# Patient Record
Sex: MALE | Race: WHITE | NOT HISPANIC OR LATINO | Employment: FULL TIME | URBAN - METROPOLITAN AREA
[De-identification: names, ages, dates, MRNs, and addresses within clinical notes are randomized per-mention and may not be internally consistent; named-entity substitution may affect disease eponyms.]

---

## 2017-10-10 ENCOUNTER — OFFICE VISIT (OUTPATIENT)
Dept: FAMILY MEDICINE | Facility: HOSPITAL | Age: 42
End: 2017-10-10
Attending: FAMILY MEDICINE

## 2017-10-10 VITALS
WEIGHT: 217.63 LBS | BODY MASS INDEX: 34.98 KG/M2 | DIASTOLIC BLOOD PRESSURE: 113 MMHG | SYSTOLIC BLOOD PRESSURE: 175 MMHG | HEART RATE: 95 BPM | HEIGHT: 66 IN | RESPIRATION RATE: 20 BRPM

## 2017-10-10 DIAGNOSIS — F10.10 ALCOHOL ABUSE: ICD-10-CM

## 2017-10-10 DIAGNOSIS — E78.2 MIXED HYPERLIPIDEMIA: ICD-10-CM

## 2017-10-10 DIAGNOSIS — R73.9 HYPERGLYCEMIA: Primary | ICD-10-CM

## 2017-10-10 DIAGNOSIS — Z71.3 DIETARY COUNSELING: ICD-10-CM

## 2017-10-10 DIAGNOSIS — E11.42 TYPE 2 DIABETES MELLITUS WITH DIABETIC POLYNEUROPATHY, WITHOUT LONG-TERM CURRENT USE OF INSULIN: ICD-10-CM

## 2017-10-10 DIAGNOSIS — Z71.82 EXERCISE COUNSELING: ICD-10-CM

## 2017-10-10 DIAGNOSIS — Z72.0 TOBACCO ABUSE: ICD-10-CM

## 2017-10-10 LAB — GLUCOSE SERPL-MCNC: 215 MG/DL (ref 70–110)

## 2017-10-10 PROCEDURE — 99214 OFFICE O/P EST MOD 30 MIN: CPT | Performed by: FAMILY MEDICINE

## 2017-10-10 PROCEDURE — 82948 REAGENT STRIP/BLOOD GLUCOSE: CPT | Performed by: FAMILY MEDICINE

## 2017-10-10 RX ORDER — ATORVASTATIN CALCIUM 40 MG/1
40 TABLET, FILM COATED ORAL DAILY
Qty: 30 TABLET | Refills: 0 | Status: SHIPPED | OUTPATIENT
Start: 2017-10-10 | End: 2019-07-22

## 2017-10-10 RX ORDER — AMLODIPINE BESYLATE 10 MG/1
10 TABLET ORAL DAILY
Qty: 30 TABLET | Refills: 11 | Status: SHIPPED | OUTPATIENT
Start: 2017-10-10 | End: 2018-10-10

## 2017-10-10 RX ORDER — ASPIRIN 81 MG/1
81 TABLET ORAL DAILY
Status: DISCONTINUED | OUTPATIENT
Start: 2017-10-10 | End: 2019-07-22

## 2017-10-10 RX ORDER — GABAPENTIN 300 MG/1
300 CAPSULE ORAL 3 TIMES DAILY
Qty: 90 CAPSULE | Refills: 11 | Status: SHIPPED | OUTPATIENT
Start: 2017-10-10 | End: 2019-07-22

## 2017-10-10 RX ORDER — GLYBURIDE 1.25 MG/1
2.5 TABLET ORAL
Qty: 60 TABLET | Refills: 11 | Status: SHIPPED | OUTPATIENT
Start: 2017-10-10 | End: 2019-07-22

## 2017-10-10 RX ORDER — LISINOPRIL 10 MG/1
20 TABLET ORAL DAILY
Qty: 60 TABLET | Refills: 0 | Status: SHIPPED | OUTPATIENT
Start: 2017-10-10 | End: 2017-11-09

## 2017-10-10 NOTE — PROGRESS NOTES
Subjective:       Patient ID: Hany Guerin is a 42 y.o. male.    Chief Complaint: Numbness; Hypertension; and Diabetes    Patient 43 yo male that is new to this clinic but has known hx of DM2, HTN, HLD, EtOH abuse, tobacco abuse that presents here to establish care. States that he was on medications including metformin but states that he has not taken any of his medications for over a year. States that it was because he continued to have diarrhea for over 2 months. Since then has not checked BP at home or home glucose. Does complain about BL lower extremity numbness, tingling and stabbing pain.       Review of Systems   Constitutional: Negative for chills, fatigue and fever.   HENT: Negative for congestion and sinus pressure.    Respiratory: Negative for chest tightness and shortness of breath.    Cardiovascular: Negative for chest pain, palpitations and leg swelling.   Gastrointestinal: Negative for abdominal pain, constipation, diarrhea, nausea and vomiting.   Genitourinary: Negative for dysuria.   Musculoskeletal: Positive for myalgias.   Neurological: Positive for numbness. Negative for light-headedness.       Objective:      Vitals:    10/10/17 0931   BP: (!) 175/113   Pulse: 95   Resp: 20     Physical Exam   Constitutional: He is oriented to person, place, and time. He appears well-developed and well-nourished.   HENT:   Head: Normocephalic and atraumatic.   Eyes: Pupils are equal, round, and reactive to light.   Cardiovascular: Normal rate, regular rhythm, normal heart sounds and intact distal pulses.  Exam reveals no gallop and no friction rub.    No murmur heard.  Pulmonary/Chest: Effort normal and breath sounds normal. No respiratory distress. He has no wheezes. He has no rales. He exhibits no tenderness.   Abdominal: Soft. Bowel sounds are normal. There is no tenderness.   Musculoskeletal: Normal range of motion. He exhibits no edema or tenderness.   Neurological: He is alert and oriented to  person, place, and time. He has normal reflexes.   Skin: Skin is warm. No erythema.   Psychiatric: He has a normal mood and affect. His behavior is normal.   Nursing note and vitals reviewed.      Assessment:       1. Hyperglycemia    2. Type 2 diabetes mellitus with diabetic polyneuropathy, without long-term current use of insulin    3. Mixed hyperlipidemia    4. Alcohol abuse    5. Tobacco abuse    6. Dietary counseling    7. Exercise counseling        Plan:       Hyperglycemia  -     POCT Glucose  -     lisinopril 10 MG tablet; Take 2 tablets (20 mg total) by mouth once daily.  Dispense: 60 tablet; Refill: 0  -     glyBURIDE (DIABETA) 1.25 MG Tab; Take 2 tablets (2.5 mg total) by mouth daily with breakfast.  Dispense: 60 tablet; Refill: 11  -     atorvastatin (LIPITOR) 40 MG tablet; Take 1 tablet (40 mg total) by mouth once daily.  Dispense: 30 tablet; Refill: 0  -     amlodipine (NORVASC) 10 MG tablet; Take 1 tablet (10 mg total) by mouth once daily.  Dispense: 30 tablet; Refill: 11  -     gabapentin (NEURONTIN) 300 MG capsule; Take 1 capsule (300 mg total) by mouth 3 (three) times daily.  Dispense: 90 capsule; Refill: 11  -     aspirin EC tablet 81 mg; Take 1 tablet (81 mg total) by mouth once daily.  -     HEMOGLOBIN A1C; Future; Expected date: 10/10/2017  -     CBC auto differential; Future; Expected date: 10/10/2017  -     Comprehensive metabolic panel; Future; Expected date: 10/10/2017  -     Lipid panel; Future; Expected date: 10/10/2017  -     Microalbumin/creatinine urine ratio    Type 2 diabetes mellitus with diabetic polyneuropathy, without long-term current use of insulin  -     lisinopril 10 MG tablet; Take 2 tablets (20 mg total) by mouth once daily.  Dispense: 60 tablet; Refill: 0  -     glyBURIDE (DIABETA) 1.25 MG Tab; Take 2 tablets (2.5 mg total) by mouth daily with breakfast.  Dispense: 60 tablet; Refill: 11  -     atorvastatin (LIPITOR) 40 MG tablet; Take 1 tablet (40 mg total) by mouth once  daily.  Dispense: 30 tablet; Refill: 0  -     amlodipine (NORVASC) 10 MG tablet; Take 1 tablet (10 mg total) by mouth once daily.  Dispense: 30 tablet; Refill: 11  -     gabapentin (NEURONTIN) 300 MG capsule; Take 1 capsule (300 mg total) by mouth 3 (three) times daily.  Dispense: 90 capsule; Refill: 11  -     aspirin EC tablet 81 mg; Take 1 tablet (81 mg total) by mouth once daily.  -     HEMOGLOBIN A1C; Future; Expected date: 10/10/2017  -     CBC auto differential; Future; Expected date: 10/10/2017  -     Comprehensive metabolic panel; Future; Expected date: 10/10/2017  -     Lipid panel; Future; Expected date: 10/10/2017  -     Microalbumin/creatinine urine ratio    Mixed hyperlipidemia  -     aspirin EC tablet 81 mg; Take 1 tablet (81 mg total) by mouth once daily.  -     Comprehensive metabolic panel; Future; Expected date: 10/10/2017  -     Lipid panel; Future; Expected date: 10/10/2017  -     Microalbumin/creatinine urine ratio    Alcohol abuse  -Counseled on quitting, patient states that he does not want behavioral intervention will attempt on his own   Tobacco abuse  -Counseled patient states that he will stopping tobacco abuse, currently smokes 2-3 cigs per day  Dietary counseling  Counseled on low carb and low sodium diet   Exercise counseling      F/U in 1 month   Leonardo Luong MD  10/10/2017  10:36 AM  [employer] [facility] PGY-3

## 2017-11-29 ENCOUNTER — OFFICE VISIT (OUTPATIENT)
Dept: URGENT CARE | Facility: CLINIC | Age: 42
End: 2017-11-29

## 2017-11-29 VITALS
HEIGHT: 66 IN | HEART RATE: 91 BPM | OXYGEN SATURATION: 98 % | BODY MASS INDEX: 35.36 KG/M2 | DIASTOLIC BLOOD PRESSURE: 111 MMHG | TEMPERATURE: 97 F | RESPIRATION RATE: 20 BRPM | SYSTOLIC BLOOD PRESSURE: 177 MMHG | WEIGHT: 220 LBS

## 2017-11-29 DIAGNOSIS — M25.522 ELBOW PAIN, LEFT: Primary | ICD-10-CM

## 2017-11-29 PROCEDURE — 99213 OFFICE O/P EST LOW 20 MIN: CPT | Mod: S$GLB,,, | Performed by: NURSE PRACTITIONER

## 2017-11-29 RX ORDER — NAPROXEN 500 MG/1
500 TABLET ORAL 2 TIMES DAILY WITH MEALS
Qty: 30 TABLET | Refills: 0 | Status: SHIPPED | OUTPATIENT
Start: 2017-11-29 | End: 2018-11-29

## 2017-11-29 NOTE — PATIENT INSTRUCTIONS
Elbow Sprain    A sprain is a tearing of the ligaments that hold a joint together. This may take up to 6 weeks to fully heal, depending on how severe it is. Moderate to severe sprains are treated with a sling or splint. Minor sprains can be treated without any special support.  Home care  The following guidelines will help you care for your injury at home:  · Keep your arm elevated to reduce pain and swelling. When sitting or lying down keep your arm above the level of your heart. You can do this by placing your arm on a pillow that rests on your chest or on a pillow at your side. This is most important during the first 2 days (48 hours) after injury.  · Put an ice pack on the injured area. Do this for 20 minutes every 1 to 2 hours the first day. You can make an ice pack by wrapping a plastic bag of ice cubes in a thin towel. As the ice melts, be careful that the splint doesnt get wet. Continue using the ice pack 3 to 4 times a day for the next 2 days. Then use the ice pack as needed to ease pain and swelling.  · If you were given a plaster or fiberglass splint, leave it on as advised, or until you see your healthcare provider. Keep it dry at all times. Bathe with your splint out of the water. Protect it with a large plastic bag, rubber-banded at the top end. If a fiberglass splint gets wet, you can dry it with a hair dryer. Once the splint is removed, move your elbow through its full range of motion several times a day. This will prevent stiffness.  · If you were given a sling only, begin gradual range-of-motion exercises after the first few days, unless told otherwise. This will prevent stiffness in the elbow. Stop wearing the sling once the pain is better.  · You may use acetaminophen or ibuprofen to control pain, unless another pain medicine was prescribed. If you have chronic liver or kidney disease, talk with your healthcare provider before using these medicines. Also talk with your provider if youve had a  stomach ulcer or gastrointestinal bleeding.  Follow-up care  Follow up with your doctor as directed.  Any X-rays you had today dont show any broken bones, breaks, or fractures. Sometimes fractures dont show up on the first X-ray. Bruises and sprains can sometimes hurt as much as a fracture. These injuries can take time to heal completely. If your symptoms dont improve or they get worse, talk with your healthcare provider. You may need a repeat X-ray or other tests.  When to seek medical advice  Call your healthcare provider right away  if any of these occur:  · The plaster splint becomes wet or soft  · The fiberglass splint remains wet for more than 24 hours  · Bad odor from the splint or wound fluid stains the splint  · Splint cracks  · Tightness or pain in the elbow gets worse  · Fingers become swollen, cold, blue, numb, or tingly  · You are less able to move the elbow, hand or fingers  · Area around splint becomes red, swollen, or irritated  · Fever of 101ºF (38.3ºC) or higher, or as directed by your healthcare provider  Date Last Reviewed: 5/1/2017  © 7944-2075 [employer]. [address] [place] [address], [place] [address]. All rights reserved. This information is not intended as a substitute for professional medical care. Always follow your healthcare professional's instructions.

## 2017-11-29 NOTE — PROGRESS NOTES
"Subjective:       Patient ID: Hany Guerin is a 42 y.o. male.    Vitals:  height is 5' 6" (1.676 m) and weight is 99.8 kg (220 lb). His temperature is 97.4 °F (36.3 °C). His blood pressure is 177/111 (abnormal) and his pulse is 91. His respiration is 20 and oxygen saturation is 98%.     Chief Complaint: Elbow Injury (left elbow)    Patient stated fell at work yesterday. Landed on left elbow, pain radiates down to fingers. Pain stated as 8/10 with movement.      Elbow Injury   This is a new problem. The current episode started yesterday. The problem occurs intermittently. The problem has been unchanged. Pertinent negatives include no abdominal pain, chest pain, neck pain, numbness or weakness. The symptoms are aggravated by twisting and exertion. He has tried ice for the symptoms. The treatment provided no relief.     Review of Systems   Constitution: Negative for weakness and malaise/fatigue.   HENT: Negative for nosebleeds.    Cardiovascular: Negative for chest pain and syncope.   Respiratory: Negative for shortness of breath.    Musculoskeletal: Positive for joint pain and stiffness. Negative for back pain and neck pain.   Gastrointestinal: Negative for abdominal pain.   Genitourinary: Negative for hematuria.   Neurological: Negative for dizziness and numbness.   All other systems reviewed and are negative.      Objective:      Physical Exam   Constitutional: He is oriented to person, place, and time. He appears well-developed and well-nourished. He is cooperative.  Non-toxic appearance. He does not appear ill. No distress.   HENT:   Head: Normocephalic and atraumatic.   Right Ear: Hearing, tympanic membrane, external ear and ear canal normal.   Left Ear: Hearing, tympanic membrane, external ear and ear canal normal.   Nose: No mucosal edema, rhinorrhea or nasal deformity. No epistaxis. Right sinus exhibits no maxillary sinus tenderness and no frontal sinus tenderness. Left sinus exhibits no maxillary " sinus tenderness and no frontal sinus tenderness.   Mouth/Throat: Uvula is midline and mucous membranes are normal. No trismus in the jaw. Normal dentition. No uvula swelling. No posterior oropharyngeal erythema.   Eyes: Conjunctivae and lids are normal. Right eye exhibits no discharge. Left eye exhibits no discharge. No scleral icterus.   Sclera clear bilat   Neck: Trachea normal, normal range of motion, full passive range of motion without pain and phonation normal. Neck supple.   Cardiovascular: Normal rate, regular rhythm and normal pulses.    Pulmonary/Chest: Effort normal and breath sounds normal. No respiratory distress.   Abdominal: Soft. Normal appearance. He exhibits no distension, no pulsatile midline mass and no mass. There is no tenderness.   Musculoskeletal: He exhibits no edema or deformity.        Left elbow: He exhibits decreased range of motion.   Neurological: He is alert and oriented to person, place, and time. He exhibits normal muscle tone. Coordination normal.   Skin: Skin is warm, dry and intact. He is not diaphoretic. No pallor.   Psychiatric: He has a normal mood and affect. His speech is normal and behavior is normal. Judgment and thought content normal. Cognition and memory are normal.   Nursing note and vitals reviewed.      Assessment:       1. Elbow pain, left        Plan:       Patient Instructions     Elbow Sprain    A sprain is a tearing of the ligaments that hold a joint together. This may take up to 6 weeks to fully heal, depending on how severe it is. Moderate to severe sprains are treated with a sling or splint. Minor sprains can be treated without any special support.  Home care  The following guidelines will help you care for your injury at home:  · Keep your arm elevated to reduce pain and swelling. When sitting or lying down keep your arm above the level of your heart. You can do this by placing your arm on a pillow that rests on your chest or on a pillow at your side. This  is most important during the first 2 days (48 hours) after injury.  · Put an ice pack on the injured area. Do this for 20 minutes every 1 to 2 hours the first day. You can make an ice pack by wrapping a plastic bag of ice cubes in a thin towel. As the ice melts, be careful that the splint doesnt get wet. Continue using the ice pack 3 to 4 times a day for the next 2 days. Then use the ice pack as needed to ease pain and swelling.  · If you were given a plaster or fiberglass splint, leave it on as advised, or until you see your healthcare provider. Keep it dry at all times. Bathe with your splint out of the water. Protect it with a large plastic bag, rubber-banded at the top end. If a fiberglass splint gets wet, you can dry it with a hair dryer. Once the splint is removed, move your elbow through its full range of motion several times a day. This will prevent stiffness.  · If you were given a sling only, begin gradual range-of-motion exercises after the first few days, unless told otherwise. This will prevent stiffness in the elbow. Stop wearing the sling once the pain is better.  · You may use acetaminophen or ibuprofen to control pain, unless another pain medicine was prescribed. If you have chronic liver or kidney disease, talk with your healthcare provider before using these medicines. Also talk with your provider if youve had a stomach ulcer or gastrointestinal bleeding.  Follow-up care  Follow up with your doctor as directed.  Any X-rays you had today dont show any broken bones, breaks, or fractures. Sometimes fractures dont show up on the first X-ray. Bruises and sprains can sometimes hurt as much as a fracture. These injuries can take time to heal completely. If your symptoms dont improve or they get worse, talk with your healthcare provider. You may need a repeat X-ray or other tests.  When to seek medical advice  Call your healthcare provider right away  if any of these occur:  · The plaster splint  becomes wet or soft  · The fiberglass splint remains wet for more than 24 hours  · Bad odor from the splint or wound fluid stains the splint  · Splint cracks  · Tightness or pain in the elbow gets worse  · Fingers become swollen, cold, blue, numb, or tingly  · You are less able to move the elbow, hand or fingers  · Area around splint becomes red, swollen, or irritated  · Fever of 101ºF (38.3ºC) or higher, or as directed by your healthcare provider  Date Last Reviewed: 5/1/2017 © 2000-2017 [employer]. [address] [place] [address]. All rights reserved. This information is not intended as a substitute for professional medical care. Always follow your healthcare professional's instructions.            Elbow pain, left  -     HME - OTHER    Other orders  -     naproxen (NAPROSYN) 500 MG tablet; Take 1 tablet (500 mg total) by mouth 2 (two) times daily with meals.  Dispense: 30 tablet; Refill: 0

## 2019-07-22 ENCOUNTER — HOSPITAL ENCOUNTER (EMERGENCY)
Facility: HOSPITAL | Age: 44
Discharge: HOME | End: 2019-07-22
Attending: EMERGENCY MEDICINE
Payer: COMMERCIAL

## 2019-07-22 VITALS
RESPIRATION RATE: 18 BRPM | TEMPERATURE: 98 F | HEIGHT: 66 IN | BODY MASS INDEX: 34.55 KG/M2 | OXYGEN SATURATION: 97 % | WEIGHT: 215 LBS | HEART RATE: 85 BPM | DIASTOLIC BLOOD PRESSURE: 83 MMHG | SYSTOLIC BLOOD PRESSURE: 140 MMHG

## 2019-07-22 DIAGNOSIS — M79.671 RIGHT FOOT PAIN: ICD-10-CM

## 2019-07-22 DIAGNOSIS — L03.115 CELLULITIS OF RIGHT FOOT: Primary | ICD-10-CM

## 2019-07-22 DIAGNOSIS — S90.129A SUPERFICIAL BRUISING OF TOE: ICD-10-CM

## 2019-07-22 DIAGNOSIS — Z86.39 HISTORY OF DIABETES MELLITUS: ICD-10-CM

## 2019-07-22 DIAGNOSIS — R73.9 HYPERGLYCEMIA: ICD-10-CM

## 2019-07-22 LAB
ALBUMIN SERPL BCP-MCNC: 3.4 G/DL (ref 3.5–5.2)
ALP SERPL-CCNC: 82 U/L (ref 55–135)
ALT SERPL W/O P-5'-P-CCNC: 31 U/L (ref 10–44)
ANION GAP SERPL CALC-SCNC: 7 MMOL/L (ref 8–16)
AST SERPL-CCNC: 20 U/L (ref 10–40)
BASOPHILS # BLD AUTO: 0.04 K/UL (ref 0–0.2)
BASOPHILS NFR BLD: 0.3 % (ref 0–1.9)
BILIRUB SERPL-MCNC: 0.8 MG/DL (ref 0.1–1)
BUN SERPL-MCNC: 14 MG/DL (ref 6–20)
CALCIUM SERPL-MCNC: 9.7 MG/DL (ref 8.7–10.5)
CHLORIDE SERPL-SCNC: 104 MMOL/L (ref 95–110)
CO2 SERPL-SCNC: 26 MMOL/L (ref 23–29)
CREAT SERPL-MCNC: 1 MG/DL (ref 0.5–1.4)
DIFFERENTIAL METHOD: ABNORMAL
EOSINOPHIL # BLD AUTO: 0.1 K/UL (ref 0–0.5)
EOSINOPHIL NFR BLD: 1 % (ref 0–8)
ERYTHROCYTE [DISTWIDTH] IN BLOOD BY AUTOMATED COUNT: 14.2 % (ref 11.5–14.5)
EST. GFR  (AFRICAN AMERICAN): >60 ML/MIN/1.73 M^2
EST. GFR  (NON AFRICAN AMERICAN): >60 ML/MIN/1.73 M^2
GLUCOSE SERPL-MCNC: 281 MG/DL (ref 70–110)
HCT VFR BLD AUTO: 44.1 % (ref 40–54)
HGB BLD-MCNC: 14.7 G/DL (ref 14–18)
LYMPHOCYTES # BLD AUTO: 2.4 K/UL (ref 1–4.8)
LYMPHOCYTES NFR BLD: 20 % (ref 18–48)
MCH RBC QN AUTO: 29.3 PG (ref 27–31)
MCHC RBC AUTO-ENTMCNC: 33.3 G/DL (ref 32–36)
MCV RBC AUTO: 88 FL (ref 82–98)
MONOCYTES # BLD AUTO: 1.7 K/UL (ref 0.3–1)
MONOCYTES NFR BLD: 13.9 % (ref 4–15)
NEUTROPHILS # BLD AUTO: 7.7 K/UL (ref 1.8–7.7)
NEUTROPHILS NFR BLD: 64.8 % (ref 38–73)
PLATELET # BLD AUTO: 136 K/UL (ref 150–350)
PMV BLD AUTO: 12 FL (ref 9.2–12.9)
POCT GLUCOSE: 270 MG/DL (ref 70–110)
POCT GLUCOSE: 273 MG/DL (ref 70–110)
POTASSIUM SERPL-SCNC: 4.5 MMOL/L (ref 3.5–5.1)
PROT SERPL-MCNC: 7.5 G/DL (ref 6–8.4)
RBC # BLD AUTO: 5.01 M/UL (ref 4.6–6.2)
SODIUM SERPL-SCNC: 137 MMOL/L (ref 136–145)
WBC # BLD AUTO: 11.93 K/UL (ref 3.9–12.7)

## 2019-07-22 PROCEDURE — 25000003 PHARM REV CODE 250: Performed by: PHYSICIAN ASSISTANT

## 2019-07-22 PROCEDURE — 99284 EMERGENCY DEPT VISIT MOD MDM: CPT | Mod: 25

## 2019-07-22 PROCEDURE — 90471 IMMUNIZATION ADMIN: CPT | Performed by: PHYSICIAN ASSISTANT

## 2019-07-22 PROCEDURE — 80053 COMPREHEN METABOLIC PANEL: CPT

## 2019-07-22 PROCEDURE — 85025 COMPLETE CBC W/AUTO DIFF WBC: CPT

## 2019-07-22 PROCEDURE — 96360 HYDRATION IV INFUSION INIT: CPT

## 2019-07-22 PROCEDURE — 63600175 PHARM REV CODE 636 W HCPCS: Performed by: PHYSICIAN ASSISTANT

## 2019-07-22 PROCEDURE — 82962 GLUCOSE BLOOD TEST: CPT

## 2019-07-22 PROCEDURE — 90715 TDAP VACCINE 7 YRS/> IM: CPT | Performed by: PHYSICIAN ASSISTANT

## 2019-07-22 RX ORDER — DOXYCYCLINE HYCLATE 100 MG
100 TABLET ORAL
Status: COMPLETED | OUTPATIENT
Start: 2019-07-22 | End: 2019-07-22

## 2019-07-22 RX ORDER — DOXYCYCLINE 100 MG/1
100 CAPSULE ORAL 2 TIMES DAILY
Qty: 20 CAPSULE | Refills: 0 | Status: SHIPPED | OUTPATIENT
Start: 2019-07-22 | End: 2019-08-01

## 2019-07-22 RX ORDER — ACETAMINOPHEN 500 MG
1000 TABLET ORAL
Status: COMPLETED | OUTPATIENT
Start: 2019-07-22 | End: 2019-07-22

## 2019-07-22 RX ADMIN — SODIUM CHLORIDE 1000 ML: 0.9 INJECTION, SOLUTION INTRAVENOUS at 08:07

## 2019-07-22 RX ADMIN — ACETAMINOPHEN 1000 MG: 500 TABLET ORAL at 07:07

## 2019-07-22 RX ADMIN — CLOSTRIDIUM TETANI TOXOID ANTIGEN (FORMALDEHYDE INACTIVATED), CORYNEBACTERIUM DIPHTHERIAE TOXOID ANTIGEN (FORMALDEHYDE INACTIVATED), BORDETELLA PERTUSSIS TOXOID ANTIGEN (GLUTARALDEHYDE INACTIVATED), BORDETELLA PERTUSSIS FILAMENTOUS HEMAGGLUTININ ANTIGEN (FORMALDEHYDE INACTIVATED), BORDETELLA PERTUSSIS PERTACTIN ANTIGEN, AND BORDETELLA PERTUSSIS FIMBRIAE 2/3 ANTIGEN 0.5 ML: 5; 2; 2.5; 5; 3; 5 INJECTION, SUSPENSION INTRAMUSCULAR at 07:07

## 2019-07-22 RX ADMIN — DOXYCYCLINE HYCLATE 100 MG: 100 TABLET, COATED ORAL at 07:07

## 2019-07-22 NOTE — ED PROVIDER NOTES
Encounter Date: 7/22/2019    SCRIBE #1 NOTE: I, Kenna Meek, am scribing for, and in the presence of,  Cr Turner PA-C. I have scribed the following portions of the note - Other sections scribed: HPI, ROS.       History     Chief Complaint   Patient presents with    Foot Pain     reports having pain to 1st digit of right foot, States being stung by catfish about three weeks ago     CC: Toe Pain    HPI: This is a 43 y.o. M who has DM, and HTN who presents to the ED for emergent evaluation of acute right great toe pain with associated swelling to the right great toe x's 3 days. Pt's pain radiates to the right foot. He works as a  and states that he is unsure if he dropped an object onto the left foot. He reports subjective fever yesterday that has since resolved. He took Ibuprofen for the pain yesterday with minimal relief. No OTC treatment attempted today. His tetanus is not up to date. Pt denies fever, chills, numbness, tingling, or Hx of Gout.    The history is provided by the patient. No  was used.     Review of patient's allergies indicates:   Allergen Reactions    Penicillins      Past Medical History:   Diagnosis Date    Diabetes mellitus     High cholesterol     Hypertension      Past Surgical History:   Procedure Laterality Date    CHOLECYSTECTOMY      CHOLECYSTECTOMY-LAPAROSCOPIC N/A 5/16/2015    Performed by Fritz Mack [facility] at [facility] OR     Family History   Problem Relation Age of Onset    Hypertension Mother     Diabetes Mother      Social History     Tobacco Use    Smoking status: Current Every Day Smoker    Smokeless tobacco: Never Used   Substance Use Topics    Alcohol use: Yes     Alcohol/week: 1.8 - 2.4 oz     Types: 3 - 4 Cans of beer per week     Comment: Daily    Drug use: Yes     Frequency: 5.0 times per week     Types: Marijuana     Review of Systems   Constitutional: Negative for chills and fever.   HENT: Negative for sore throat.     Respiratory: Negative for shortness of breath.    Cardiovascular: Negative for chest pain.   Gastrointestinal: Negative for nausea.   Genitourinary: Negative for dysuria.   Musculoskeletal: Positive for arthralgias (in the right great toe), joint swelling (in the right great toe) and myalgias (in the right foot). Negative for back pain.   Skin: Negative for rash.   Neurological: Negative for weakness and numbness.        (-) Tingling   Hematological: Does not bruise/bleed easily.       Physical Exam     Initial Vitals [07/22/19 0715]   BP Pulse Resp Temp SpO2   (!) 173/100 99 18 97.9 °F (36.6 °C) 99 %      MAP       --         Physical Exam    Nursing note and vitals reviewed.  Constitutional: He appears well-developed and well-nourished. He is not diaphoretic. No distress.   HENT:   Head: Normocephalic and atraumatic.   Nose: Nose normal.   Eyes: Conjunctivae and EOM are normal. Right eye exhibits no discharge. Left eye exhibits no discharge.   Neck: Normal range of motion. No tracheal deviation present. No JVD present.   Cardiovascular: Normal rate, regular rhythm and normal heart sounds. Exam reveals no friction rub.    No murmur heard.  Pulmonary/Chest: Breath sounds normal. No stridor. No respiratory distress. He has no wheezes. He has no rhonchi. He has no rales. He exhibits no tenderness.   Musculoskeletal:        Legs:       Feet:    Tenderness to palpation with associated bruising, swelling, and mild surrounding erythema to the lateral aspect of the right great toe.  Faint erythema extends on the medial aspect of the right mid foot.  There is minimal erythema to the right medial mid foot.  Full ROM of all toes, including the right great toe.  Minimal reproduction of pain when fully ranging his right great toe.  No ankle involvement.  Capillary refill less than 2 sec.  Distal lower extremity pulses 2+ and equal.  Sensation intact and equal in this patient with known diabetic neuropathy.  No visible or  palpable foreign bodies.  No plantar surface involvement.    Of note, patient reports a possible catfish kiah sting to the proximal aspect of the medial R shin that does not have any tenderness, swelling, erythema, or visible/palpable foreign body. There is a 0.5cm very shallow ulceration that overall appears to be healing.    Neurological: He is alert and oriented to person, place, and time.   Skin: Skin is warm and dry. No pallor.         ED Course   Procedures  Labs Reviewed   CBC W/ AUTO DIFFERENTIAL - Abnormal; Notable for the following components:       Result Value    Platelets 136 (*)     Mono # 1.7 (*)     All other components within normal limits   COMPREHENSIVE METABOLIC PANEL - Abnormal; Notable for the following components:    Glucose 281 (*)     Albumin 3.4 (*)     Anion Gap 7 (*)     All other components within normal limits   POCT GLUCOSE - Abnormal; Notable for the following components:    POCT Glucose 270 (*)     All other components within normal limits   POCT GLUCOSE - Abnormal; Notable for the following components:    POCT Glucose 273 (*)     All other components within normal limits          Imaging Results          X-Ray Foot Complete Right (Final result)  Result time 07/22/19 08:29:58    Final result by Sakina Hubbard MD (07/22/19 08:29:58)                 Impression:      There is no evidence acute bony injury of the right foot.      Electronically signed by: Sakina Hubbard MD  Date:    07/22/2019  Time:    08:29             Narrative:    EXAMINATION:  XR FOOT COMPLETE 3 VIEW RIGHT    CLINICAL HISTORY:  . Pain in right foot    TECHNIQUE:  AP, lateral, and oblique views of the right foot were performed.    COMPARISON:  None    FINDINGS:  There is no evidence fracture or malalignment.  The adjacent soft tissues are unremarkable.                                 Medical Decision Making:   History:   Old Medical Records: I decided to obtain old medical records.  Initial Assessment:    43-year-old male with right great toe pain  Independently Interpreted Test(s):   I have ordered and independently interpreted X-rays - see prior notes.  Clinical Tests:   Lab Tests: Ordered and Reviewed  Radiological Study: Ordered and Reviewed  ED Management:  Will order screening labs and imaging while treating symptoms and monitor patient.    X-ray shows no acute fracture, dislocation, foreign body, or osteomyelitis.  Mild hyperglycemia and noted without evidence of DKA.  Labs otherwise unremarkable. Given bruising, I am wondering if patient sustained some form of trauma. Regardless, he appears to be developing cellulitis as well. No septic joint clinically today.  Remote injury of catfish kiah does not appear to be actively infected today.  I do not see a visible kiah today.  Will send home with antibiotics and supportive care.  Tetanus updated.  Advising follow-up with PCP.  Strict return precautions discussed with patient.  Patient agreeable to plan..             Scribe Attestation:   Scribe #1: I performed the above scribed service and the documentation accurately describes the services I performed. I attest to the accuracy of the note.    Attending Attestation:           Physician Attestation for Scribe:  Physician Attestation Statement for Scribe #1: I, Cr Turner PA-C, reviewed documentation, as scribed by Kenna Meek in my presence, and it is both accurate and complete.                    Clinical Impression:       ICD-10-CM ICD-9-CM   1. Cellulitis of right foot L03.115 682.7   2. Right foot pain M79.671 729.5   3. Superficial bruising of toe S90.129A 924.3   4. Hyperglycemia R73.9 790.29   5. History of diabetes mellitus Z86.39 V12.29                                Cr Turner PA-C  07/22/19 9706

## 2022-07-06 ENCOUNTER — OFFICE VISIT (OUTPATIENT)
Dept: INTERNAL MEDICINE | Facility: CLINIC | Age: 47
End: 2022-07-06
Payer: COMMERCIAL

## 2022-07-06 VITALS
BODY MASS INDEX: 35.65 KG/M2 | HEIGHT: 66 IN | SYSTOLIC BLOOD PRESSURE: 152 MMHG | HEART RATE: 109 BPM | WEIGHT: 221.81 LBS | DIASTOLIC BLOOD PRESSURE: 80 MMHG

## 2022-07-06 DIAGNOSIS — E66.9 OBESITY (BMI 35.0-39.9 WITHOUT COMORBIDITY): ICD-10-CM

## 2022-07-06 DIAGNOSIS — L97.522 DIABETIC ULCER OF LEFT FOOT ASSOCIATED WITH TYPE 2 DIABETES MELLITUS, WITH FAT LAYER EXPOSED, UNSPECIFIED PART OF FOOT: Primary | ICD-10-CM

## 2022-07-06 DIAGNOSIS — E11.621 DIABETIC ULCER OF LEFT FOOT ASSOCIATED WITH TYPE 2 DIABETES MELLITUS, WITH FAT LAYER EXPOSED, UNSPECIFIED PART OF FOOT: Primary | ICD-10-CM

## 2022-07-06 DIAGNOSIS — Z76.89 ENCOUNTER TO ESTABLISH CARE WITH NEW DOCTOR: ICD-10-CM

## 2022-07-06 PROBLEM — U07.1 COVID-19: Status: ACTIVE | Noted: 2021-08-18

## 2022-07-06 PROBLEM — S98.111A: Status: ACTIVE | Noted: 2019-08-12

## 2022-07-06 PROBLEM — E11.9 DIABETES MELLITUS TYPE 2 IN NONOBESE: Status: ACTIVE | Noted: 2019-08-09

## 2022-07-06 PROBLEM — Z72.0 TOBACCO ABUSE: Status: ACTIVE | Noted: 2019-08-09

## 2022-07-06 PROBLEM — I10 HYPERTENSION: Status: ACTIVE | Noted: 2022-07-06

## 2022-07-06 PROCEDURE — 99204 PR OFFICE/OUTPT VISIT, NEW, LEVL IV, 45-59 MIN: ICD-10-PCS | Mod: S$GLB,,, | Performed by: NURSE PRACTITIONER

## 2022-07-06 PROCEDURE — 3008F PR BODY MASS INDEX (BMI) DOCUMENTED: ICD-10-PCS | Mod: CPTII,S$GLB,, | Performed by: NURSE PRACTITIONER

## 2022-07-06 PROCEDURE — 1159F PR MEDICATION LIST DOCUMENTED IN MEDICAL RECORD: ICD-10-PCS | Mod: CPTII,S$GLB,, | Performed by: NURSE PRACTITIONER

## 2022-07-06 PROCEDURE — 99999 PR PBB SHADOW E&M-EST. PATIENT-LVL IV: CPT | Mod: PBBFAC,,, | Performed by: NURSE PRACTITIONER

## 2022-07-06 PROCEDURE — 3008F BODY MASS INDEX DOCD: CPT | Mod: CPTII,S$GLB,, | Performed by: NURSE PRACTITIONER

## 2022-07-06 PROCEDURE — 3079F PR MOST RECENT DIASTOLIC BLOOD PRESSURE 80-89 MM HG: ICD-10-PCS | Mod: CPTII,S$GLB,, | Performed by: NURSE PRACTITIONER

## 2022-07-06 PROCEDURE — 1159F MED LIST DOCD IN RCRD: CPT | Mod: CPTII,S$GLB,, | Performed by: NURSE PRACTITIONER

## 2022-07-06 PROCEDURE — 1160F RVW MEDS BY RX/DR IN RCRD: CPT | Mod: CPTII,S$GLB,, | Performed by: NURSE PRACTITIONER

## 2022-07-06 PROCEDURE — 3077F PR MOST RECENT SYSTOLIC BLOOD PRESSURE >= 140 MM HG: ICD-10-PCS | Mod: CPTII,S$GLB,, | Performed by: NURSE PRACTITIONER

## 2022-07-06 PROCEDURE — 99204 OFFICE O/P NEW MOD 45 MIN: CPT | Mod: S$GLB,,, | Performed by: NURSE PRACTITIONER

## 2022-07-06 PROCEDURE — 3079F DIAST BP 80-89 MM HG: CPT | Mod: CPTII,S$GLB,, | Performed by: NURSE PRACTITIONER

## 2022-07-06 PROCEDURE — 3077F SYST BP >= 140 MM HG: CPT | Mod: CPTII,S$GLB,, | Performed by: NURSE PRACTITIONER

## 2022-07-06 PROCEDURE — 1160F PR REVIEW ALL MEDS BY PRESCRIBER/CLIN PHARMACIST DOCUMENTED: ICD-10-PCS | Mod: CPTII,S$GLB,, | Performed by: NURSE PRACTITIONER

## 2022-07-06 PROCEDURE — 99999 PR PBB SHADOW E&M-EST. PATIENT-LVL IV: ICD-10-PCS | Mod: PBBFAC,,, | Performed by: NURSE PRACTITIONER

## 2022-07-06 RX ORDER — DULAGLUTIDE 1.5 MG/.5ML
1.5 INJECTION, SOLUTION SUBCUTANEOUS
COMMUNITY
Start: 2022-03-23 | End: 2022-07-20

## 2022-07-06 RX ORDER — CLINDAMYCIN HYDROCHLORIDE 300 MG/1
300 CAPSULE ORAL EVERY 6 HOURS
Qty: 28 CAPSULE | Refills: 0 | Status: SHIPPED | OUTPATIENT
Start: 2022-07-06 | End: 2022-07-13

## 2022-07-06 RX ORDER — METRONIDAZOLE 10 MG/G
GEL TOPICAL EVERY OTHER DAY
COMMUNITY
Start: 2022-05-24

## 2022-07-06 NOTE — PROGRESS NOTES
Subjective:       Patient ID: Hany Guerin is a 46 y.o. male.    Chief Complaint: Foot Pain (L Pain=7)    Pt new to me, here for wound on foot. Has a hx of diabetic ulcer. Was seeing [place] at [facility]. Pt stopped going. Wants to see someone here at [facility]. Has been dealing with wound for 5-6 months. Last wound care visit with [facility] was 5-24-22. Plan of care was the following:    ASSESSMENT/PLAN:  1. Diabetic ulcer of left foot associated with type 2 diabetes mellitus, with fat layer exposed, unspecified part of foot (CMS/HCC)   2. Tobacco abuse   3. Diabetes mellitus type 2 in nonobese (CMS/[facility])     Wound appears stable. The patient has not done his x-ray are bloodwork that was ordered last week. He was encouraged to do so.  He reports compliance with his Darco shoe. If this continues to worsen may consider total contact cast  Orders Placed This Encounter   Procedures    Apply dressing   Metrogel, silvercell, bulky dressing     Return in about 1 week (around 5/31/2022).  Fredy Linder MD    Blood sugars have been 150-350s per pt. On Trulicity once a week. Wants a PCP with [facility] for management.    Review of Systems   Constitutional: Negative for activity change, appetite change, chills, diaphoresis, fatigue, fever and unexpected weight change.   Respiratory: Negative for chest tightness and shortness of breath.    Cardiovascular: Negative for chest pain, palpitations, leg swelling and claudication.   Genitourinary: Negative for dysuria.   Musculoskeletal: Negative for arthralgias, joint swelling, leg pain and myalgias.   Integumentary:  Positive for wound.   Allergic/Immunologic: Positive for frequent infections. Negative for environmental allergies and food allergies.   Neurological: Negative for dizziness, weakness and numbness.   Hematological: Negative for adenopathy. Does not bruise/bleed easily.   Psychiatric/Behavioral: Negative for suicidal ideas.        Review of patient's allergies  "indicates:   Allergen Reactions    Penicillins        Current Outpatient Medications:     metronidazole 1% (METROGEL) 1 % Gel, Apply topically every other day., Disp: , Rfl:     TRULICITY 1.5 mg/0.5 mL pen injector, Inject 1.5 mg into the skin every 7 days., Disp: , Rfl:     lisinopril (PRINIVIL,ZESTRIL) 5 MG tablet, Take 5 mg by mouth once daily., Disp: , Rfl:     Patient Active Problem List   Diagnosis    Pancreatitis    Diabetes mellitus type 2 in nonobese    Hypertension    Tobacco abuse    COVID-19    Amputated great toe, right     Past Medical History:   Diagnosis Date    Diabetes mellitus     High cholesterol     Hypertension      Past Surgical History:   Procedure Laterality Date    CHOLECYSTECTOMY       Social History     Socioeconomic History    Marital status:    Tobacco Use    Smoking status: Current Every Day Smoker    Smokeless tobacco: Never Used   Substance and Sexual Activity    Alcohol use: Yes     Alcohol/week: 3.0 - 4.0 standard drinks     Types: 3 - 4 Cans of beer per week     Comment: Daily    Drug use: Yes     Frequency: 5.0 times per week     Types: Marijuana     Family History   Problem Relation Age of Onset    Hypertension Mother     Diabetes Mother            Objective:       Vitals:    07/06/22 1159   BP: (!) 152/80   Pulse: 109   Weight: 100.6 kg (221 lb 12.5 oz)   Height: 5' 6" (1.676 m)   PainSc:   7   PainLoc: Foot     Body mass index is 35.8 kg/m².    Physical Exam  Vitals and nursing note reviewed.   Constitutional:       Appearance: He is obese.   HENT:      Head: Normocephalic.      Nose: Nose normal.      Mouth/Throat:      Mouth: Mucous membranes are moist.   Eyes:      Conjunctiva/sclera: Conjunctivae normal.   Cardiovascular:      Rate and Rhythm: Tachycardia present.   Pulmonary:      Effort: Pulmonary effort is normal.   Musculoskeletal:         General: Normal range of motion.      Cervical back: Normal range of motion.   Feet:      Left foot: "      Skin integrity: Ulcer, skin breakdown, erythema and warmth present.      Comments: See pic  Skin:     General: Skin is dry.   Neurological:      General: No focal deficit present.      Mental Status: He is alert and oriented to person, place, and time.   Psychiatric:         Mood and Affect: Mood normal.         Behavior: Behavior normal.         Thought Content: Thought content normal.         Judgment: Judgment normal.             Assessment:       Problem List Items Addressed This Visit    None     Visit Diagnoses     Diabetic ulcer of left foot associated with type 2 diabetes mellitus, with fat layer exposed, unspecified part of foot    -  Primary    Relevant Medications    TRULICITY 1.5 mg/0.5 mL pen injector    clindamycin (CLEOCIN) 300 MG capsule    Other Relevant Orders    Ambulatory referral/consult to Wound Clinic    BMI 35.0-35.9,adult        Obesity (BMI 35.0-39.9 without comorbidity)        Encounter to establish care with new doctor        Relevant Orders    Ambulatory referral/consult to Internal Medicine          Plan:       Hany was seen today for foot pain.    Diagnoses and all orders for this visit:    Diabetic ulcer of left foot associated with type 2 diabetes mellitus, with fat layer exposed, unspecified part of foot  -     Ambulatory referral/consult to Wound Clinic; Future  -     clindamycin (CLEOCIN) 300 MG capsule; Take 1 capsule (300 mg total) by mouth every 6 (six) hours. for 7 days    BMI 35.0-35.9,adult  BMI reviewed    Obesity (BMI 35.0-39.9 without comorbidity)  BMI reviewed.    Diet and exercise to lose weight.    Encounter to establish care with new doctor  -     Ambulatory referral/consult to Internal Medicine; Future    Keep clean with antibacterial soap and water    Antibiotics oral 4 times a day for 7 days    Wound urgent referral    May need ER eval if worsens due to uncontrolled diabetes and unresolved infection, may require IV antibiotics    Fasting lab orders, will  call with results, if results ok, RTC in 1 yr for annual or sooner prn with one of MDs I work with who can be your new PCP: Dr. Nicolas Richmond, Dr. Alexandre Nguyen, Dr. Jenn Amaya, Dr. Elyse Daniels, Dr. Rogelio Herrera, or  Dr. Sarbjit Mcconnell, or Dr. Arabella Ruiz    Self care instructions provided in AVS    Follow up in about 4 weeks (around 8/3/2022) for with one of MDs recommended on AVS to establish care.

## 2022-07-06 NOTE — PATIENT INSTRUCTIONS
Keep clean with antibacterial soap and water    Antibiotics oral 4 times a day for 7 days    Wound urgent referral    May need ER eval if worsens due to uncontrolled diabetes and unresolved infection, may require IV antibiotics    Fasting lab orders, will call with results, if results ok, RTC in 1 yr for annual or sooner prn with one of MDs I work with who can be your new PCP: Dr. Nicolas Richmond, Dr. Alexandre Nguyen, Dr. Jenn Amaya, Dr. Elyse Daniels, Dr. Rogelio Herrera, or  Dr. Sarbjit Mcconnell, or Dr. Arabella Ruiz

## 2022-07-12 ENCOUNTER — HOSPITAL ENCOUNTER (OUTPATIENT)
Dept: WOUND CARE | Facility: HOSPITAL | Age: 47
Discharge: HOME | End: 2022-07-12
Attending: FAMILY MEDICINE
Payer: COMMERCIAL

## 2022-07-12 VITALS
WEIGHT: 212 LBS | HEART RATE: 80 BPM | RESPIRATION RATE: 20 BRPM | DIASTOLIC BLOOD PRESSURE: 80 MMHG | TEMPERATURE: 98 F | HEIGHT: 66 IN | BODY MASS INDEX: 34.07 KG/M2 | SYSTOLIC BLOOD PRESSURE: 146 MMHG

## 2022-07-12 DIAGNOSIS — E11.621 DIABETIC ULCER OF LEFT FOOT ASSOCIATED WITH TYPE 2 DIABETES MELLITUS, WITH FAT LAYER EXPOSED, UNSPECIFIED PART OF FOOT: ICD-10-CM

## 2022-07-12 DIAGNOSIS — L97.522 DIABETIC ULCER OF LEFT FOOT ASSOCIATED WITH TYPE 2 DIABETES MELLITUS, WITH FAT LAYER EXPOSED, UNSPECIFIED PART OF FOOT: ICD-10-CM

## 2022-07-12 DIAGNOSIS — E11.621 TYPE 2 DIABETES MELLITUS WITH FOOT ULCER, WITH LONG-TERM CURRENT USE OF INSULIN: ICD-10-CM

## 2022-07-12 DIAGNOSIS — L97.509 TYPE 2 DIABETES MELLITUS WITH FOOT ULCER, WITH LONG-TERM CURRENT USE OF INSULIN: ICD-10-CM

## 2022-07-12 DIAGNOSIS — Z79.4 TYPE 2 DIABETES MELLITUS WITH FOOT ULCER, WITH LONG-TERM CURRENT USE OF INSULIN: ICD-10-CM

## 2022-07-12 PROCEDURE — 99215 OFFICE O/P EST HI 40 MIN: CPT | Mod: 25 | Performed by: FAMILY MEDICINE

## 2022-07-12 PROCEDURE — 11042 DEBRIDEMENT: ICD-10-PCS | Mod: ,,, | Performed by: FAMILY MEDICINE

## 2022-07-12 PROCEDURE — 11042 DBRDMT SUBQ TIS 1ST 20SQCM/<: CPT | Performed by: FAMILY MEDICINE

## 2022-07-12 PROCEDURE — 87186 SC STD MICRODIL/AGAR DIL: CPT | Performed by: FAMILY MEDICINE

## 2022-07-12 PROCEDURE — 11042 DBRDMT SUBQ TIS 1ST 20SQCM/<: CPT | Mod: ,,, | Performed by: FAMILY MEDICINE

## 2022-07-12 PROCEDURE — 99204 PR OFFICE/OUTPT VISIT, NEW, LEVL IV, 45-59 MIN: ICD-10-PCS | Mod: 25,,, | Performed by: FAMILY MEDICINE

## 2022-07-12 PROCEDURE — 87147 CULTURE TYPE IMMUNOLOGIC: CPT | Performed by: FAMILY MEDICINE

## 2022-07-12 PROCEDURE — 87205 SMEAR GRAM STAIN: CPT | Performed by: FAMILY MEDICINE

## 2022-07-12 PROCEDURE — 87070 CULTURE OTHR SPECIMN AEROBIC: CPT | Performed by: FAMILY MEDICINE

## 2022-07-12 PROCEDURE — 87077 CULTURE AEROBIC IDENTIFY: CPT | Performed by: FAMILY MEDICINE

## 2022-07-12 PROCEDURE — 99204 OFFICE O/P NEW MOD 45 MIN: CPT | Mod: 25,,, | Performed by: FAMILY MEDICINE

## 2022-07-12 RX ORDER — AMLODIPINE BESYLATE 10 MG/1
10 TABLET ORAL DAILY
COMMUNITY
End: 2022-07-20

## 2022-07-12 NOTE — PROGRESS NOTES
[facility]                Progress Note    Subjective:       Patient ID: Hany Guerin is a 46 y.o. male.    Chief Complaint: Wound Check    TTo clinic on knee scooter with modified football on and no Darco, bottom of dsg is discolored with dirt and some serous/sang drainage through gauze. Has had wound 5-6 months and was being followed up with [facility] [place] [facility] when he stopped going on 5/24. He now realizes that he has to take care of his wound to keep foot. Had been changing at home applying hydrogel like substance and a few wraps of gauze. States drainage serous/sang in nature and oderless. Continues on abx that was started on after visit to [facility]. He would like to establish care with us as well as primary care. He smokes and we reviewed smokings impact on healing he verbalized understanding and was told about classes to aid in cessation. He does not check blood sugar discussed high blood sugars impact on healing agreed to check daily as last HGBA1C was 8.2. Two wounds one on plantar aspect left foot that is 2 cm deep and probing bone.  Fifth toe laterally there is another wound that does not tunnel to nearby wound. Both draining oderless clear serous/sang drainage even after manipulation. Perwound is red aand foot is swollen especially around fifth toe, states that foot is half the size it was and redness significantly decreased. He states aware of increased protein in diet will aid in healing. He works as a  and is on his feet most of day.    MD note:  Patient presenting today for wound to left foot.  Patient is a smoker with uncontrolled type 2 DM.  He has not had any recent labs in Epic.  No fevers, chills, or sweats.  He is on antibiotics at the moment.        Review of Systems   Constitutional: Negative.    HENT: Negative.    Eyes: Negative.    Respiratory: Negative.    Cardiovascular: Negative.    Gastrointestinal: Negative.    Skin: Positive for wound.  "  Psychiatric/Behavioral: Negative.          Objective:        Physical Exam  Constitutional:       Appearance: Normal appearance.   HENT:      Head: Normocephalic and atraumatic.      Right Ear: External ear normal.      Left Ear: External ear normal.      Mouth/Throat:      Mouth: Mucous membranes are moist.      Pharynx: Oropharynx is clear.   Eyes:      Extraocular Movements: Extraocular movements intact.      Conjunctiva/sclera: Conjunctivae normal.      Pupils: Pupils are equal, round, and reactive to light.   Cardiovascular:      Rate and Rhythm: Normal rate and regular rhythm.   Pulmonary:      Effort: Pulmonary effort is normal. No respiratory distress.   Abdominal:      General: There is no distension.      Palpations: Abdomen is soft.   Skin:     General: Skin is warm and dry.      Comments: +DFU with drainage and maceration   Neurological:      Mental Status: He is alert and oriented to person, place, and time. Mental status is at baseline.           Vitals:    07/12/22 0830   BP: (!) 146/80   Pulse: 80   Resp: 20   Temp: 97.7 °F (36.5 °C)     Debridement    Date/Time: 7/12/2022 8:00 AM  Performed by: Lyle Thorne MD  Authorized by: Lyle Thorne MD     Time out: Immediately prior to procedure a "time out" was called to verify the correct patient, procedure, equipment, support staff and site/side marked as required.    Consent Done?:  Yes (Written)  Local anesthesia used?: Yes    Local anesthetic:  Topical anesthetic  Anesthetic total (ml):  10    Wound Details:    Location:  Left foot    Location:  Left Midfoot    Type of Debridement:  Excisional       Length (cm):  1.5       Area (sq cm):  3       Width (cm):  2       Percent Debrided (%):  100       Depth (cm):  2       Total Area Debrided (sq cm):  3    Depth of debridement:  Subcutaneous tissue    Tissue debrided:  Dermis, Epidermis and Subcutaneous    Devitalized tissue debrided:  Biofilm, Fibrin and Slough    Instruments:  " Curette    Bleeding:  Minimal  Hemostasis Achieved: Yes    Method Used:  Pressure  Patient tolerance:  Patient tolerated the procedure well with no immediate complications      Assessment:           ICD-10-CM ICD-9-CM   1. Diabetic ulcer of left foot associated with type 2 diabetes mellitus, with fat layer exposed, unspecified part of foot  E11.621 250.80    L97.522 707.15   2. Type 2 diabetes mellitus with foot ulcer, with long-term current use of insulin  E11.621 250.80    L97.509 707.15    Z79.4 V58.67            Wound 07/12/22 0851 Diabetic Ulcer plantar;lateral Foot (Active)   07/12/22 0851    Pre-existing: Yes   Primary Wound Type: Diabetic ulc   Side:    Orientation: plantar;lateral   Location: Foot   Wound Number:    Ankle-Brachial Index:    Pulses:    Removal Indication and Assessment:    Wound Outcome:    (Retired) Wound Type:    (Retired) Wound Length (cm):    (Retired) Wound Width (cm):    (Retired) Depth (cm):    Wound Description (Comments):    Removal Indications:    Wound Image   07/12/22 0800   Wound WDL ex 07/12/22 0800   Dressing Appearance Dry;Intact 07/12/22 0800   Drainage Amount Moderate 07/12/22 0800   Drainage Characteristics/Odor Serosanguineous;No odor 07/12/22 0800   Appearance Red 07/12/22 0800   Tissue loss description Full thickness 07/12/22 0800   Red (%), Wound Tissue Color 100 % 07/12/22 0800   Periwound Area Macerated 07/12/22 0800   Wound Edges Defined 07/12/22 0800   Wound Length (cm) 1.5 cm 07/12/22 0800   Wound Width (cm) 2 cm 07/12/22 0800   Wound Depth (cm) 2 cm 07/12/22 0800   Wound Volume (cm^3) 6 cm^3 07/12/22 0800   Wound Surface Area (cm^2) 3 cm^2 07/12/22 0800   Care Cleansed with:;Antimicrobial agent;Sterile normal saline 07/12/22 0800   Dressing Changed 07/12/22 0800   Off Loading Football dressing;Off loading shoe;Other (see comments) 07/12/22 0800   Dressing Change Due 07/19/22 07/12/22 0800            Wound 07/12/22 0852 Diabetic Ulcer Left lateral Toe, fifth  (Active)   07/12/22 0852    Pre-existing: Yes   Primary Wound Type: Diabetic ulc   Side: Left   Orientation: lateral   Location: Toe, fifth   Wound Number:    Ankle-Brachial Index:    Pulses:    Removal Indication and Assessment:    Wound Outcome:    (Retired) Wound Type:    (Retired) Wound Length (cm):    (Retired) Wound Width (cm):    (Retired) Depth (cm):    Wound Description (Comments):    Removal Indications:    Wound Image   07/12/22 0800   Wound WDL ex 07/12/22 0800   Dressing Appearance Dry;Intact 07/12/22 0800   Drainage Amount Moderate 07/12/22 0800   Drainage Characteristics/Odor Serosanguineous 07/12/22 0800   Appearance Red 07/12/22 0800   Tissue loss description Partial thickness 07/12/22 0800   Red (%), Wound Tissue Color 100 % 07/12/22 0800   Periwound Area Redness;Macerated;Edematous 07/12/22 0800   Wound Edges Defined 07/12/22 0800   Wound Length (cm) 1.2 cm 07/12/22 0800   Wound Width (cm) 1.2 cm 07/12/22 0800   Wound Depth (cm) 0.4 cm 07/12/22 0800   Wound Volume (cm^3) 0.576 cm^3 07/12/22 0800   Wound Surface Area (cm^2) 1.44 cm^2 07/12/22 0800   Care Cleansed with:;Antimicrobial agent;Sterile normal saline 07/12/22 0800   Dressing Changed 07/12/22 0800   Off Loading Football dressing;Off loading shoe;Other (see comments) 07/12/22 0800   Dressing Change Due 07/19/22 07/12/22 0800           Plan:                Orders Placed This Encounter   Procedures    Debridement     This order was created via procedure documentation     Standing Status:   Standing     Number of Occurrences:   1    CULTURE, TISSUE    X-Ray Foot Complete Left     Standing Status:   Future     Standing Expiration Date:   7/12/2023     Order Specific Question:   May the Radiologist modify the order per protocol to meet the clinical needs of the patient?     Answer:   Yes     Order Specific Question:   Release to patient     Answer:   Immediate    CBC Auto Differential     Standing Status:   Future     Standing Expiration  Date:   7/12/2023    Comprehensive Metabolic Panel     Standing Status:   Future     Standing Expiration Date:   7/12/2023    Hemoglobin A1C     Standing Status:   Future     Standing Expiration Date:   7/12/2023    C-Reactive Protein     Standing Status:   Future     Standing Expiration Date:   7/12/2023    Sedimentation rate     Standing Status:   Future     Standing Expiration Date:   9/10/2023    Ambulatory referral/consult to Wound Clinic     Standing Status:   Standing     Number of Occurrences:   1     Referral Priority:   Urgent     Referral Type:   Consultation     Referral Reason:   Specialty Services Required     Requested Specialty:   Wound Care     Number of Visits Requested:   1    Change dressing     Gentian Violet to dong wound, Hydrafera Blue transfer strip to plantar wound and cover surface of both, Drawtex and long Mextra, football with three cast padding/Coban Darco in XL.        Follow up in about 1 week (around 7/19/2022).     Lyle Thorne MD

## 2022-07-13 ENCOUNTER — TELEPHONE (OUTPATIENT)
Dept: FAMILY MEDICINE | Facility: CLINIC | Age: 47
End: 2022-07-13
Payer: COMMERCIAL

## 2022-07-13 ENCOUNTER — HOSPITAL ENCOUNTER (OUTPATIENT)
Dept: RADIOLOGY | Facility: HOSPITAL | Age: 47
Discharge: HOME | End: 2022-07-13
Attending: FAMILY MEDICINE
Payer: COMMERCIAL

## 2022-07-13 ENCOUNTER — PATIENT MESSAGE (OUTPATIENT)
Dept: FAMILY MEDICINE | Facility: CLINIC | Age: 47
End: 2022-07-13
Payer: COMMERCIAL

## 2022-07-13 DIAGNOSIS — E11.621 DIABETIC ULCER OF LEFT FOOT ASSOCIATED WITH TYPE 2 DIABETES MELLITUS, WITH FAT LAYER EXPOSED, UNSPECIFIED PART OF FOOT: ICD-10-CM

## 2022-07-13 DIAGNOSIS — L97.522 DIABETIC ULCER OF LEFT FOOT ASSOCIATED WITH TYPE 2 DIABETES MELLITUS, WITH FAT LAYER EXPOSED, UNSPECIFIED PART OF FOOT: ICD-10-CM

## 2022-07-13 DIAGNOSIS — L97.426 DIABETIC ULCER OF LEFT MIDFOOT ASSOCIATED WITH TYPE 2 DIABETES MELLITUS, WITH BONE INVOLVEMENT WITHOUT EVIDENCE OF NECROSIS: ICD-10-CM

## 2022-07-13 DIAGNOSIS — L97.509 TYPE 2 DIABETES MELLITUS WITH FOOT ULCER, WITH LONG-TERM CURRENT USE OF INSULIN: Primary | ICD-10-CM

## 2022-07-13 DIAGNOSIS — Z79.4 TYPE 2 DIABETES MELLITUS WITH FOOT ULCER, WITH LONG-TERM CURRENT USE OF INSULIN: Primary | ICD-10-CM

## 2022-07-13 DIAGNOSIS — Z79.4 TYPE 2 DIABETES MELLITUS WITH FOOT ULCER, WITH LONG-TERM CURRENT USE OF INSULIN: ICD-10-CM

## 2022-07-13 DIAGNOSIS — E11.621 TYPE 2 DIABETES MELLITUS WITH FOOT ULCER, WITH LONG-TERM CURRENT USE OF INSULIN: ICD-10-CM

## 2022-07-13 DIAGNOSIS — E11.621 TYPE 2 DIABETES MELLITUS WITH FOOT ULCER, WITH LONG-TERM CURRENT USE OF INSULIN: Primary | ICD-10-CM

## 2022-07-13 DIAGNOSIS — L97.509 TYPE 2 DIABETES MELLITUS WITH FOOT ULCER, WITH LONG-TERM CURRENT USE OF INSULIN: ICD-10-CM

## 2022-07-13 DIAGNOSIS — E11.621 DIABETIC ULCER OF LEFT MIDFOOT ASSOCIATED WITH TYPE 2 DIABETES MELLITUS, WITH BONE INVOLVEMENT WITHOUT EVIDENCE OF NECROSIS: ICD-10-CM

## 2022-07-13 PROBLEM — L97.526 DIABETIC ULCER OF LEFT FOOT ASSOCIATED WITH TYPE 2 DIABETES MELLITUS, WITH BONE INVOLVEMENT WITHOUT EVIDENCE OF NECROSIS: Status: ACTIVE | Noted: 2022-07-13

## 2022-07-13 PROCEDURE — 73630 X-RAY EXAM OF FOOT: CPT | Mod: TC,FY,PO,LT

## 2022-07-13 PROCEDURE — 73630 X-RAY EXAM OF FOOT: CPT | Mod: 26,LT,, | Performed by: INTERNAL MEDICINE

## 2022-07-13 PROCEDURE — 73630 XR FOOT COMPLETE 3 VIEW LEFT: ICD-10-PCS | Mod: 26,LT,, | Performed by: INTERNAL MEDICINE

## 2022-07-13 NOTE — TELEPHONE ENCOUNTER
----- Message from Lyle Thorne MD sent at 7/13/2022  2:06 PM CDT -----  Please get patient scheduled for MRI.  His results were sent to him in [facility].    Thanks  Dr. Thorne

## 2022-07-13 NOTE — PROGRESS NOTES
Please get patient scheduled for MRI.  His results were sent to him in [facility].    Thanks  Dr. Thorne

## 2022-07-13 NOTE — TELEPHONE ENCOUNTER
----- Message from Lesli Pineda sent at 7/13/2022  2:37 PM CDT -----  Type: Patient Call Back    Who called:pt    What is the request in detail:pt requesting to speak to nurse in regards to why he needs a mri of his foot. Please call pt to discuss.     Can the clinic reply by [facility]?    Would the patient rather a call back or a response via [facility]? call    Best call back number:[phone] (home)       Additional Information:

## 2022-07-14 LAB
BACTERIA TISS AEROBE CULT: ABNORMAL
BACTERIA TISS AEROBE CULT: ABNORMAL
GRAM STN SPEC: ABNORMAL
GRAM STN SPEC: ABNORMAL

## 2022-07-15 ENCOUNTER — PATIENT MESSAGE (OUTPATIENT)
Dept: WOUND CARE | Facility: HOSPITAL | Age: 47
End: 2022-07-15
Payer: COMMERCIAL

## 2022-07-19 ENCOUNTER — TELEPHONE (OUTPATIENT)
Dept: FAMILY MEDICINE | Facility: CLINIC | Age: 47
End: 2022-07-19
Payer: COMMERCIAL

## 2022-07-19 ENCOUNTER — HOSPITAL ENCOUNTER (OUTPATIENT)
Dept: WOUND CARE | Facility: HOSPITAL | Age: 47
Discharge: HOME | End: 2022-07-19
Attending: FAMILY MEDICINE
Payer: COMMERCIAL

## 2022-07-19 VITALS
SYSTOLIC BLOOD PRESSURE: 157 MMHG | DIASTOLIC BLOOD PRESSURE: 87 MMHG | HEART RATE: 117 BPM | TEMPERATURE: 98 F | RESPIRATION RATE: 18 BRPM

## 2022-07-19 DIAGNOSIS — L97.509 TYPE 2 DIABETES MELLITUS WITH FOOT ULCER, WITH LONG-TERM CURRENT USE OF INSULIN: ICD-10-CM

## 2022-07-19 DIAGNOSIS — L97.526 DIABETIC ULCER OF LEFT FOOT ASSOCIATED WITH TYPE 2 DIABETES MELLITUS, WITH BONE INVOLVEMENT WITHOUT EVIDENCE OF NECROSIS: Primary | ICD-10-CM

## 2022-07-19 DIAGNOSIS — E11.621 TYPE 2 DIABETES MELLITUS WITH FOOT ULCER, WITH LONG-TERM CURRENT USE OF INSULIN: ICD-10-CM

## 2022-07-19 DIAGNOSIS — E11.621 DIABETIC ULCER OF LEFT MIDFOOT ASSOCIATED WITH TYPE 2 DIABETES MELLITUS, WITH BONE INVOLVEMENT WITHOUT EVIDENCE OF NECROSIS: Primary | ICD-10-CM

## 2022-07-19 DIAGNOSIS — L97.426 DIABETIC ULCER OF LEFT MIDFOOT ASSOCIATED WITH TYPE 2 DIABETES MELLITUS, WITH BONE INVOLVEMENT WITHOUT EVIDENCE OF NECROSIS: Primary | ICD-10-CM

## 2022-07-19 DIAGNOSIS — E11.621 DIABETIC ULCER OF LEFT FOOT ASSOCIATED WITH TYPE 2 DIABETES MELLITUS, WITH BONE INVOLVEMENT WITHOUT EVIDENCE OF NECROSIS: Primary | ICD-10-CM

## 2022-07-19 DIAGNOSIS — Z79.4 TYPE 2 DIABETES MELLITUS WITH FOOT ULCER, WITH LONG-TERM CURRENT USE OF INSULIN: ICD-10-CM

## 2022-07-19 PROCEDURE — 99214 PR OFFICE/OUTPT VISIT, EST, LEVL IV, 30-39 MIN: ICD-10-PCS | Mod: ,,, | Performed by: FAMILY MEDICINE

## 2022-07-19 PROCEDURE — 99214 OFFICE O/P EST MOD 30 MIN: CPT | Mod: ,,, | Performed by: FAMILY MEDICINE

## 2022-07-19 PROCEDURE — 99215 OFFICE O/P EST HI 40 MIN: CPT | Performed by: FAMILY MEDICINE

## 2022-07-19 RX ORDER — DOXYCYCLINE 100 MG/1
100 CAPSULE ORAL EVERY 12 HOURS
Qty: 20 CAPSULE | Refills: 0 | Status: SHIPPED | OUTPATIENT
Start: 2022-07-19 | End: 2022-07-29

## 2022-07-19 NOTE — PROGRESS NOTES
[facility]                Progress Note    Subjective:       Patient ID: Hany Guerin is a 46 y.o. male.    Chief Complaint: Non-healing Wound Follow Up    Pt arrived to [facility] using knee scooter to ambulated into room without any issues. Pt denies any fever, chills, or flu-like symptoms; denies pain/discomfort. 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 Once drsg removed; noted to have large amt of SSA drainage and primary drsg had shifted off wound base. Labs & MRI ordered & scheduled. Pt will return to [facility] in 1 wk.     MD note:  Patient following up today for DFU of left foot.  xrays done last week showed evidence of osteo.  He had cultures done showing positive for S. Agalactiae and Klebsiella.  He recently finished clindamycin and has recently been prescribed doxycycline.  He denies any pain in wound.  Labs and MRI were ordered, but have not been done at this time.        Review of Systems   Constitutional: Negative.    HENT: Negative.    Eyes: Negative.    Respiratory: Negative.    Cardiovascular: Negative.    Gastrointestinal: Negative.    Skin: Positive for wound.   Psychiatric/Behavioral: Negative.          Objective:        Physical Exam  Constitutional:       Appearance: Normal appearance.   HENT:      Head: Normocephalic and atraumatic.      Mouth/Throat:      Mouth: Mucous membranes are moist.      Pharynx: Oropharynx is clear.   Eyes:      Conjunctiva/sclera: Conjunctivae normal.      Pupils: Pupils are equal, round, and reactive to light.   Cardiovascular:      Rate and Rhythm: Normal rate and regular rhythm.   Pulmonary:      Effort: Pulmonary effort is normal. No respiratory distress.   Abdominal:      General: There is no distension.      Palpations: Abdomen is soft.   Skin:     General: Skin is warm and dry.      Comments: +DFU to left foot with some maceration    Neurological:      Mental Status: He is alert and oriented to person, place, and time. Mental status is at baseline.         Vitals:    07/19/22 1033   BP: (!) 157/87   Pulse: (!) 117   Resp: 18   Temp: 97.8 °F (36.6 °C)       Assessment:           ICD-10-CM ICD-9-CM   1. Diabetic ulcer of left foot associated with type 2 diabetes mellitus, with bone involvement without evidence of necrosis  E11.621 250.80    L97.526 707.15   2. Type 2 diabetes mellitus with foot ulcer, with long-term current use of insulin  E11.621 250.80    L97.509 707.15    Z79.4 V58.67            Wound 07/12/22 0851 Diabetic Ulcer plantar;lateral Foot (Active)   07/12/22 0851    Pre-existing: Yes   Primary Wound Type: Diabetic ulc   Side:    Orientation: plantar;lateral   Location: Foot   Wound Number:    Ankle-Brachial Index:    Pulses:    Removal Indication and Assessment:    Wound Outcome:    (Retired) Wound Type:    (Retired) Wound Length (cm):    (Retired) Wound Width (cm):    (Retired) Depth (cm):    Wound Description (Comments):    Removal Indications:    Wound Image   07/19/22 1000   Wound WDL ex 07/19/22 1000   Dressing Appearance Saturated;Moist drainage 07/19/22 1000   Drainage Amount Large 07/19/22 1000   Drainage Characteristics/Odor Serosanguineous 07/19/22 1000   Appearance Red;Yellow;Bone;Tendon 07/19/22 1000   Tissue loss description Full thickness 07/19/22 1000   Black (%), Wound Tissue Color 0 % 07/19/22 1000   Red (%), Wound Tissue Color 50 % 07/19/22 1000   Yellow (%), Wound Tissue Color 50 % 07/19/22 1000   Periwound Area Macerated;Redness;Edematous 07/19/22 1000   Wound Edges Callused 07/19/22 1000   Wound Length (cm) 1.1 cm 07/19/22 1000   Wound Width (cm) 1.2 cm 07/19/22 1000   Wound Depth (cm) 1.2 cm 07/19/22 1000   Wound Volume (cm^3) 1.584 cm^3 07/19/22 1000   Wound Surface Area (cm^2) 1.32 cm^2 07/19/22 1000   Tunneling (depth (cm)/location) 0 07/19/22 1000   Undermining (depth (cm)/location) 0 07/19/22 1000   Care  Cleansed with:;Sterile normal saline 07/19/22 1000   Dressing Applied 07/19/22 1000   Periwound Care Skin barrier film applied 07/19/22 1000   Off Loading Football dressing;Off loading shoe 07/19/22 1000            Wound 07/12/22 0852 Diabetic Ulcer Left lateral Toe, fifth (Active)   07/12/22 0852    Pre-existing: Yes   Primary Wound Type: Diabetic ulc   Side: Left   Orientation: lateral   Location: Toe, fifth   Wound Number:    Ankle-Brachial Index:    Pulses:    Removal Indication and Assessment:    Wound Outcome:    (Retired) Wound Type:    (Retired) Wound Length (cm):    (Retired) Wound Width (cm):    (Retired) Depth (cm):    Wound Description (Comments):    Removal Indications:    Wound Image   07/19/22 1000   Wound WDL ex 07/19/22 1000   Dressing Appearance Moist drainage;Saturated 07/19/22 1000   Drainage Amount Large 07/19/22 1000   Drainage Characteristics/Odor Serosanguineous 07/19/22 1000   Appearance Red 07/19/22 1000   Tissue loss description Full thickness 07/19/22 1000   Black (%), Wound Tissue Color 0 % 07/19/22 1000   Red (%), Wound Tissue Color 50 % 07/19/22 1000   Yellow (%), Wound Tissue Color 50 % 07/19/22 1000   Periwound Area Redness;Edematous 07/19/22 1000   Wound Edges Defined;Open 07/19/22 1000   Wound Length (cm) 0.5 cm 07/19/22 1000   Wound Width (cm) 0.5 cm 07/19/22 1000   Wound Depth (cm) 0.5 cm 07/19/22 1000   Wound Volume (cm^3) 0.125 cm^3 07/19/22 1000   Wound Surface Area (cm^2) 0.25 cm^2 07/19/22 1000   Tunneling (depth (cm)/location) 0 07/19/22 1000   Undermining (depth (cm)/location) 0 07/19/22 1000   Care Cleansed with:;Sterile normal saline 07/19/22 1000   Dressing Applied 07/19/22 1000   Periwound Care Skin barrier film applied 07/19/22 1000   Off Loading Football dressing;Off loading shoe 07/19/22 1000           Plan:                Orders Placed This Encounter   Procedures    Change dressing     Clean wound with NS. Gent Selena & Benzoin to periwound. Hydrofera blue  Transfer to wound bed, Drawtex, mextra; secure with medipore tape. Foam. Football drsg (cast padding x3, coban). Darco.     Spoke with Dr. Zaragoza who will do bone biopsy  Refer to ID  MRI foot scheduled for tomorrow    Follow up in about 1 week (around 7/26/2022) for Wound Care.   Lyle Thorne MD

## 2022-07-20 ENCOUNTER — OFFICE VISIT (OUTPATIENT)
Dept: FAMILY MEDICINE | Facility: CLINIC | Age: 47
End: 2022-07-20
Payer: COMMERCIAL

## 2022-07-20 ENCOUNTER — HOSPITAL ENCOUNTER (OUTPATIENT)
Dept: RADIOLOGY | Facility: HOSPITAL | Age: 47
Discharge: HOME | End: 2022-07-20
Attending: FAMILY MEDICINE
Payer: COMMERCIAL

## 2022-07-20 VITALS
OXYGEN SATURATION: 96 % | SYSTOLIC BLOOD PRESSURE: 162 MMHG | RESPIRATION RATE: 18 BRPM | WEIGHT: 215.06 LBS | TEMPERATURE: 98 F | HEART RATE: 111 BPM | HEIGHT: 66 IN | DIASTOLIC BLOOD PRESSURE: 84 MMHG | BODY MASS INDEX: 34.56 KG/M2

## 2022-07-20 DIAGNOSIS — L97.509 TYPE 2 DIABETES MELLITUS WITH FOOT ULCER, WITH LONG-TERM CURRENT USE OF INSULIN: ICD-10-CM

## 2022-07-20 DIAGNOSIS — E11.65 TYPE 2 DIABETES MELLITUS WITH HYPERGLYCEMIA, WITHOUT LONG-TERM CURRENT USE OF INSULIN: ICD-10-CM

## 2022-07-20 DIAGNOSIS — E11.621 TYPE 2 DIABETES MELLITUS WITH FOOT ULCER, WITH LONG-TERM CURRENT USE OF INSULIN: ICD-10-CM

## 2022-07-20 DIAGNOSIS — I10 BENIGN ESSENTIAL HTN: ICD-10-CM

## 2022-07-20 DIAGNOSIS — E11.621 DIABETIC ULCER OF LEFT MIDFOOT ASSOCIATED WITH TYPE 2 DIABETES MELLITUS, WITH BONE INVOLVEMENT WITHOUT EVIDENCE OF NECROSIS: ICD-10-CM

## 2022-07-20 DIAGNOSIS — L97.426 DIABETIC ULCER OF LEFT MIDFOOT ASSOCIATED WITH TYPE 2 DIABETES MELLITUS, WITH BONE INVOLVEMENT WITHOUT EVIDENCE OF NECROSIS: ICD-10-CM

## 2022-07-20 DIAGNOSIS — F17.210 TOBACCO DEPENDENCE DUE TO CIGARETTES: ICD-10-CM

## 2022-07-20 DIAGNOSIS — Z00.00 VISIT FOR WELL MAN HEALTH CHECK: Primary | ICD-10-CM

## 2022-07-20 DIAGNOSIS — Z79.4 TYPE 2 DIABETES MELLITUS WITH FOOT ULCER, WITH LONG-TERM CURRENT USE OF INSULIN: ICD-10-CM

## 2022-07-20 PROCEDURE — 99999 PR PBB SHADOW E&M-EST. PATIENT-LVL IV: ICD-10-PCS | Mod: PBBFAC,,, | Performed by: FAMILY MEDICINE

## 2022-07-20 PROCEDURE — 1160F RVW MEDS BY RX/DR IN RCRD: CPT | Mod: CPTII,S$GLB,, | Performed by: FAMILY MEDICINE

## 2022-07-20 PROCEDURE — 73718 MRI LOWER EXTREMITY W/O DYE: CPT | Mod: 26,LT,, | Performed by: RADIOLOGY

## 2022-07-20 PROCEDURE — 73718 MRI FOOT (FOREFOOT) LEFT WITHOUT CONTRAST: ICD-10-PCS | Mod: 26,LT,, | Performed by: RADIOLOGY

## 2022-07-20 PROCEDURE — 3077F SYST BP >= 140 MM HG: CPT | Mod: CPTII,S$GLB,, | Performed by: FAMILY MEDICINE

## 2022-07-20 PROCEDURE — 3079F DIAST BP 80-89 MM HG: CPT | Mod: CPTII,S$GLB,, | Performed by: FAMILY MEDICINE

## 2022-07-20 PROCEDURE — 3008F BODY MASS INDEX DOCD: CPT | Mod: CPTII,S$GLB,, | Performed by: FAMILY MEDICINE

## 2022-07-20 PROCEDURE — 73718 MRI LOWER EXTREMITY W/O DYE: CPT | Mod: TC,LT

## 2022-07-20 PROCEDURE — 1159F MED LIST DOCD IN RCRD: CPT | Mod: CPTII,S$GLB,, | Performed by: FAMILY MEDICINE

## 2022-07-20 PROCEDURE — 3046F PR MOST RECENT HEMOGLOBIN A1C LEVEL > 9.0%: ICD-10-PCS | Mod: CPTII,S$GLB,, | Performed by: FAMILY MEDICINE

## 2022-07-20 PROCEDURE — 4010F PR ACE/ARB THEARPY RXD/TAKEN: ICD-10-PCS | Mod: CPTII,S$GLB,, | Performed by: FAMILY MEDICINE

## 2022-07-20 PROCEDURE — 99999 PR PBB SHADOW E&M-EST. PATIENT-LVL IV: CPT | Mod: PBBFAC,,, | Performed by: FAMILY MEDICINE

## 2022-07-20 PROCEDURE — 4010F ACE/ARB THERAPY RXD/TAKEN: CPT | Mod: CPTII,S$GLB,, | Performed by: FAMILY MEDICINE

## 2022-07-20 PROCEDURE — 3079F PR MOST RECENT DIASTOLIC BLOOD PRESSURE 80-89 MM HG: ICD-10-PCS | Mod: CPTII,S$GLB,, | Performed by: FAMILY MEDICINE

## 2022-07-20 PROCEDURE — 99396 PREV VISIT EST AGE 40-64: CPT | Mod: S$GLB,,, | Performed by: FAMILY MEDICINE

## 2022-07-20 PROCEDURE — 1160F PR REVIEW ALL MEDS BY PRESCRIBER/CLIN PHARMACIST DOCUMENTED: ICD-10-PCS | Mod: CPTII,S$GLB,, | Performed by: FAMILY MEDICINE

## 2022-07-20 PROCEDURE — 3046F HEMOGLOBIN A1C LEVEL >9.0%: CPT | Mod: CPTII,S$GLB,, | Performed by: FAMILY MEDICINE

## 2022-07-20 PROCEDURE — 1159F PR MEDICATION LIST DOCUMENTED IN MEDICAL RECORD: ICD-10-PCS | Mod: CPTII,S$GLB,, | Performed by: FAMILY MEDICINE

## 2022-07-20 PROCEDURE — 3008F PR BODY MASS INDEX (BMI) DOCUMENTED: ICD-10-PCS | Mod: CPTII,S$GLB,, | Performed by: FAMILY MEDICINE

## 2022-07-20 PROCEDURE — 99396 PR PREVENTIVE VISIT,EST,40-64: ICD-10-PCS | Mod: S$GLB,,, | Performed by: FAMILY MEDICINE

## 2022-07-20 PROCEDURE — 3077F PR MOST RECENT SYSTOLIC BLOOD PRESSURE >= 140 MM HG: ICD-10-PCS | Mod: CPTII,S$GLB,, | Performed by: FAMILY MEDICINE

## 2022-07-20 RX ORDER — AMLODIPINE AND OLMESARTAN MEDOXOMIL 10; 20 MG/1; MG/1
1 TABLET ORAL DAILY
Qty: 90 TABLET | Refills: 1 | Status: SHIPPED | OUTPATIENT
Start: 2022-07-20 | End: 2023-07-20

## 2022-07-20 RX ORDER — AMLODIPINE BESYLATE 10 MG/1
10 TABLET ORAL DAILY
Status: CANCELLED | OUTPATIENT
Start: 2022-07-20

## 2022-07-20 RX ORDER — HYDROCHLOROTHIAZIDE 12.5 MG/1
12.5 CAPSULE ORAL EVERY MORNING
COMMUNITY
Start: 2022-07-13 | End: 2022-07-20 | Stop reason: SDUPTHER

## 2022-07-20 RX ORDER — BLOOD-GLUCOSE SENSOR
EACH MISCELLANEOUS
Qty: 3 EACH | Refills: 12 | Status: SHIPPED | OUTPATIENT
Start: 2022-07-20 | End: 2022-07-21 | Stop reason: SDUPTHER

## 2022-07-20 RX ORDER — BLOOD-GLUCOSE,RECEIVER,CONT
EACH MISCELLANEOUS
Qty: 1 EACH | Refills: 0 | Status: SHIPPED | OUTPATIENT
Start: 2022-07-20

## 2022-07-20 RX ORDER — HYDROCHLOROTHIAZIDE 12.5 MG/1
12.5 CAPSULE ORAL EVERY MORNING
Qty: 90 CAPSULE | Refills: 1 | Status: SHIPPED | OUTPATIENT
Start: 2022-07-20

## 2022-07-20 RX ORDER — SEMAGLUTIDE 1.34 MG/ML
1 INJECTION, SOLUTION SUBCUTANEOUS
Qty: 1 PEN | Refills: 2 | Status: SHIPPED | OUTPATIENT
Start: 2022-07-20 | End: 2022-07-22 | Stop reason: SDUPTHER

## 2022-07-20 RX ORDER — DULAGLUTIDE 1.5 MG/.5ML
1.5 INJECTION, SOLUTION SUBCUTANEOUS
Status: CANCELLED | OUTPATIENT
Start: 2022-07-20

## 2022-07-20 RX ORDER — BLOOD-GLUCOSE TRANSMITTER
EACH MISCELLANEOUS
Qty: 1 EACH | Refills: 2 | Status: SHIPPED | OUTPATIENT
Start: 2022-07-20 | End: 2022-07-21 | Stop reason: SDUPTHER

## 2022-07-20 NOTE — TELEPHONE ENCOUNTER
----- Message from Jak Foster sent at 7/20/2022  4:01 PM CDT -----      Name of Who is Calling: ANTONIO ALVAREZ [[id number]]      What is the request in detail: Pt called to speak with the office.Please contact to further discuss and advise.          Can the clinic reply by [facility]: N      [facility] [place] [phone]

## 2022-07-20 NOTE — PROGRESS NOTES
"Well Man VISIT      CHIEF COMPLAINT  Chief Complaint   Patient presents with    [facility]    Hypertension    Diabetes       HPI  Hany Guerin is a 46 y.o. male who presents for wellness.     Social Factors  Tobacco use: Yes   Ready to Quit: willing to try  Alcohol: Yes on occasion  Intimate partner violence screening  "Do you feel safe in your current relationship?" Yes   "Have you ever been in a relationship in which your partner frightened you or hurt you?" No  Living Will/POA: No  Regular Exercise: No    Depression  Over the past two weeks, have you felt down, depressed, or hopeless? No  Over the past two weeks, have you felt little interest or pleasure in doing things? No    Reproductive Health  STD screening in last year: deferred  HIV screening: deferred    Screen for Chronic Disease  CHD Risk Factors: diabetes mellitus, hypertension and obesity (BMI >= 30 kg/m2)  Estimated body mass index is 34.71 kg/m² as calculated from the following:    Height as of this encounter: 5' 6" (1.676 m).    Weight as of this encounter: 97.6 kg (215 lb 0.9 oz).  Dyslipidemia screening needed: order todya  T2DM screening needed: utd  Colonoscopy needed: n/a  PSA needed: n/a  AAA screening needed:n/a  Screen men 35 years and older, and men 20 to 34 years of age who have cardiovascular risk factors for dyslipidemia  Begin screening colonoscopies at 50 years of age in men of average risk, and continue until 75 years of age; offer fecal occult blood testing every year, flexible sigmoidoscopy every five years combined with fecal occult blood testing every three years, or colonoscopy every 10 years   The American U[employer] Association recommends offering PSA testing and digital rectal examination to well-informed men beginning at 40 years of age and continuing until life expectancy is less than 10 years  Screen once with ultrasonography in men 65 to 75 years of age if they have a family history or have smoked at " [id number] cigarettes in their lifetime  Screen men with a sustained blood pressure greater than 135/80 mm Hg for T2DM      Immunizations  delayed    ALLERGIES and MEDS were verified.   PMHx, PSHx, FHx, SOCIALHx were updated as pertinent.    REVIEW OF SYSTEMS  Review of Systems   Constitutional: Negative.    HENT: Negative.    Eyes: Negative.    Respiratory: Negative.    Cardiovascular: Negative.    Gastrointestinal: Negative.    Genitourinary: Negative.    Musculoskeletal: Negative.    Skin:        +DFU to left foot   Neurological: Negative.          PHYSICAL EXAM  VITAL SIGNS: BP (!) 162/84   Pulse (!) 111   Temp 98 °F (36.7 °C) (Oral)   Resp 18   Ht 5' 6" (1.676 m)   Wt 97.6 kg (215 lb 0.9 oz)   SpO2 96%   BMI 34.71 kg/m²   GEN: Well developed, Well nourished, No acute distress.  HENT: Normocephalic, Atraumatic, Bilateral external ears normal, Nose normal, Oropharynx moist, No oral exudates.   Eyes: PERRL, EOMI, Conjunctiva normal, No discharge.   Neck: Supple, No tenderness.  Lymphatic: No cervical or supraclavicular lymphadenopathy noted.   Cardiovascular: Normal heart rate, Normal rhythm, No murmurs, No rubs, No gallops.   Thorax & Lungs: Normal breath sounds, No respiratory distress, No wheezing.  Abdomen: Soft, No tenderness, Bowel sounds normal.  Genital: deferred  Skin: Warm, Dry, No erythema, No rash.   Extremities: No edema, No tenderness.       ASSESSMENT/PLAN    Hany was seen today for establish care, hypertension and diabetes.    Diagnoses and all orders for this visit:    Visit for [facility] health check    Benign essential HTN  -     hydroCHLOROthiazide (MICROZIDE) 12.5 mg capsule; Take 1 capsule (12.5 mg total) by mouth every morning.  -     amlodipine-olmesartan (KENZIE) 10-20 mg per tablet; Take 1 tablet by mouth once daily.    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  -     blood-glucose meter,continuous (DEXCOM G6 ) Misc; Use as needed to monitor blood glucose  -     blood-glucose sensor (DEXCOM G6 SENSOR) Alicja; Use as needed to monitor blood glucose  -     blood-glucose transmitter (DEXCOM G6 TRANSMITTER) Alicja; Use as needed to monitor blood glucose    Type 2 diabetes mellitus with hyperglycemia, without long-term current use of insulin  -     blood-glucose meter,continuous (DEXCOM G6 ) Misc; Use as needed to monitor blood glucose  -     blood-glucose sensor (DEXCOM G6 SENSOR) Alicja; Use as needed to monitor blood glucose  -     blood-glucose transmitter (DEXCOM G6 TRANSMITTER) Alicja; Use as needed to monitor blood glucose    Tobacco dependence due to cigarettes    Other orders  The following orders have not been finalized:  -     Cancel: TRULICITY 1.5 mg/0.5 mL pen injector  -     Cancel: amLODIPine (NORVASC) 10 MG tablet       FOLLOW UP: 3 months  Follow up wound care next week for DFU as scheduled      Lyle Thorne MD

## 2022-07-21 RX ORDER — BLOOD-GLUCOSE SENSOR
EACH MISCELLANEOUS
Qty: 3 EACH | Refills: 12 | Status: SHIPPED | OUTPATIENT
Start: 2022-07-21 | End: 2022-07-22 | Stop reason: SDUPTHER

## 2022-07-21 RX ORDER — BLOOD-GLUCOSE TRANSMITTER
EACH MISCELLANEOUS
Qty: 1 EACH | Refills: 2 | Status: SHIPPED | OUTPATIENT
Start: 2022-07-21

## 2022-07-21 NOTE — TELEPHONE ENCOUNTER
No new care gaps identified.  [facility] Embedded Care Gaps. Reference number: [id number]. 7/21/2022   8:06:35 AM CDT

## 2022-07-22 DIAGNOSIS — L97.426 DIABETIC ULCER OF LEFT MIDFOOT ASSOCIATED WITH TYPE 2 DIABETES MELLITUS, WITH BONE INVOLVEMENT WITHOUT EVIDENCE OF NECROSIS: ICD-10-CM

## 2022-07-22 DIAGNOSIS — E11.621 DIABETIC ULCER OF LEFT MIDFOOT ASSOCIATED WITH TYPE 2 DIABETES MELLITUS, WITH BONE INVOLVEMENT WITHOUT EVIDENCE OF NECROSIS: ICD-10-CM

## 2022-07-22 DIAGNOSIS — E11.65 TYPE 2 DIABETES MELLITUS WITH HYPERGLYCEMIA, WITHOUT LONG-TERM CURRENT USE OF INSULIN: ICD-10-CM

## 2022-07-22 RX ORDER — BLOOD-GLUCOSE SENSOR
EACH MISCELLANEOUS
Qty: 3 EACH | Refills: 12 | Status: SHIPPED | OUTPATIENT
Start: 2022-07-22 | End: 2022-08-30 | Stop reason: SDUPTHER

## 2022-07-22 RX ORDER — SEMAGLUTIDE 1.34 MG/ML
1 INJECTION, SOLUTION SUBCUTANEOUS
Qty: 1 PEN | Refills: 2 | Status: SHIPPED | OUTPATIENT
Start: 2022-07-22 | End: 2023-07-22

## 2022-07-22 NOTE — TELEPHONE ENCOUNTER
No new care gaps identified.  [facility] Embedded Care Gaps. Reference number: [id number]. 7/22/2022   8:05:54 AM ALMAST

## 2022-07-25 ENCOUNTER — PATIENT MESSAGE (OUTPATIENT)
Dept: ADMINISTRATIVE | Facility: HOSPITAL | Age: 47
End: 2022-07-25
Payer: COMMERCIAL

## 2022-07-26 ENCOUNTER — HOSPITAL ENCOUNTER (OUTPATIENT)
Dept: WOUND CARE | Facility: HOSPITAL | Age: 47
Discharge: HOME | End: 2022-07-26
Attending: FAMILY MEDICINE
Payer: COMMERCIAL

## 2022-07-26 ENCOUNTER — TELEPHONE (OUTPATIENT)
Dept: PHARMACY | Facility: CLINIC | Age: 47
End: 2022-07-26
Payer: COMMERCIAL

## 2022-07-26 VITALS — SYSTOLIC BLOOD PRESSURE: 142 MMHG | HEART RATE: 99 BPM | DIASTOLIC BLOOD PRESSURE: 89 MMHG | TEMPERATURE: 97 F

## 2022-07-26 DIAGNOSIS — E11.621 TYPE 2 DIABETES MELLITUS WITH FOOT ULCER, WITH LONG-TERM CURRENT USE OF INSULIN: ICD-10-CM

## 2022-07-26 DIAGNOSIS — L97.426 DIABETIC ULCER OF LEFT MIDFOOT ASSOCIATED WITH TYPE 2 DIABETES MELLITUS, WITH BONE INVOLVEMENT WITHOUT EVIDENCE OF NECROSIS: Primary | ICD-10-CM

## 2022-07-26 DIAGNOSIS — L97.509 TYPE 2 DIABETES MELLITUS WITH FOOT ULCER, WITH LONG-TERM CURRENT USE OF INSULIN: ICD-10-CM

## 2022-07-26 DIAGNOSIS — Z79.4 TYPE 2 DIABETES MELLITUS WITH FOOT ULCER, WITH LONG-TERM CURRENT USE OF INSULIN: ICD-10-CM

## 2022-07-26 DIAGNOSIS — E11.621 DIABETIC ULCER OF LEFT MIDFOOT ASSOCIATED WITH TYPE 2 DIABETES MELLITUS, WITH BONE INVOLVEMENT WITHOUT EVIDENCE OF NECROSIS: Primary | ICD-10-CM

## 2022-07-26 PROCEDURE — 99499 UNLISTED E&M SERVICE: CPT | Mod: ,,, | Performed by: FAMILY MEDICINE

## 2022-07-26 PROCEDURE — 11042 DBRDMT SUBQ TIS 1ST 20SQCM/<: CPT | Mod: ,,, | Performed by: FAMILY MEDICINE

## 2022-07-26 PROCEDURE — 11042 DBRDMT SUBQ TIS 1ST 20SQCM/<: CPT | Performed by: FAMILY MEDICINE

## 2022-07-26 PROCEDURE — 87205 SMEAR GRAM STAIN: CPT | Performed by: FAMILY MEDICINE

## 2022-07-26 PROCEDURE — 11042 DEBRIDEMENT: ICD-10-PCS | Mod: ,,, | Performed by: FAMILY MEDICINE

## 2022-07-26 PROCEDURE — 87070 CULTURE OTHR SPECIMN AEROBIC: CPT | Performed by: FAMILY MEDICINE

## 2022-07-26 PROCEDURE — 99499 NO LOS: ICD-10-PCS | Mod: ,,, | Performed by: FAMILY MEDICINE

## 2022-07-26 NOTE — PROGRESS NOTES
[facility]                Progress Note    Subjective:       Patient ID: Hany Guerin is a 46 y.o. male.    Chief Complaint: No chief complaint on file.    HPI    Review of Systems      Objective:        Physical Exam    Vitals:    07/26/22 0832   BP: (!) 142/89   Pulse: 99   Temp: 97.2 °F (36.2 °C)       Assessment:           ICD-10-CM ICD-9-CM   1. 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 Type 2 diabetes mellitus with foot ulcer, with long-term current use of insulin  E11.621 250.80    L97.509 707.15    Z79.4 V58.67            Wound 07/12/22 0851 Diabetic Ulcer plantar;lateral Foot (Active)   07/12/22 0851    Pre-existing: Yes   Primary Wound Type: Diabetic ulc   Side:    Orientation: plantar;lateral   Location: Foot   Wound Number:    Ankle-Brachial Index:    Pulses:    Removal Indication and Assessment:    Wound Outcome:    (Retired) Wound Type:    (Retired) Wound Length (cm):    (Retired) Wound Width (cm):    (Retired) Depth (cm):    Wound Description (Comments):    Removal Indications:    Wound Image   07/26/22 0800   Wound WDL ex 07/26/22 0800   Dressing Appearance Saturated 07/26/22 0800   Drainage Amount Large 07/26/22 0800   Drainage Characteristics/Odor Sanguineous 07/26/22 0800   Appearance Red 07/26/22 0800   Tissue loss description Full thickness 07/26/22 0800   Red (%), Wound Tissue Color 100 % 07/26/22 0800   Wound Length (cm) 1.5 cm 07/26/22 0800   Wound Width (cm) 2 cm 07/26/22 0800   Wound Depth (cm) 1.3 cm 07/26/22 0800   Wound Volume (cm^3) 3.9 cm^3 07/26/22 0800   Wound Surface Area (cm^2) 3 cm^2 07/26/22 0800   Care Cleansed with:;Antimicrobial agent;Sterile normal saline 07/26/22 0800   Dressing Changed 07/26/22 0800   Periwound Care Skin barrier film applied 07/26/22 0800   Off Loading Football dressing;Off loading  shoe 07/26/22 0800   Dressing Change Due 08/05/22 07/26/22 0800            Wound 07/12/22 0852 Diabetic Ulcer Left lateral Toe, fifth (Active)   07/12/22 0852    Pre-existing: Yes   Primary Wound Type: Diabetic ulc   Side: Left   Orientation: lateral   Location: Toe, fifth   Wound Number:    Ankle-Brachial Index:    Pulses:    Removal Indication and Assessment:    Wound Outcome:    (Retired) Wound Type:    (Retired) Wound Length (cm):    (Retired) Wound Width (cm):    (Retired) Depth (cm):    Wound Description (Comments):    Removal Indications:    Wound Image   07/26/22 0800   Wound WDL ex 07/26/22 0800   Dressing Appearance Dried drainage 07/26/22 0800   Drainage Amount Large 07/26/22 0800   Drainage Characteristics/Odor Serosanguineous 07/26/22 0800   Appearance Red 07/26/22 0800   Red (%), Wound Tissue Color 100 % 07/26/22 0800   Periwound Area Edematous;Macerated 07/26/22 0800   Wound Length (cm) 2.5 cm 07/26/22 0800   Wound Width (cm) 2 cm 07/26/22 0800   Wound Depth (cm) 0.4 cm 07/26/22 0800   Wound Volume (cm^3) 2 cm^3 07/26/22 0800   Wound Surface Area (cm^2) 5 cm^2 07/26/22 0800           Plan:                Orders Placed This Encounter   Procedures    CULTURE, TISSUE        Follow up in about 1 week (around 8/2/2022) for Bone bx with Mila.

## 2022-07-26 NOTE — PROGRESS NOTES
[facility]                Progress Note    Subjective:       Patient ID: Hany Guerin is a 46 y.o. male.    Chief Complaint: No chief complaint on file.    Walked to clinic unaided. Dsg intact and drainage contained within. Mextra saturated to capacity with serous/sang drainage Wounds bigger but no slough Plantar red and same depth with bone felt but not seen.  Fifth toe lateral has 4 small openings within wound and no slough. Less odor seen.  Missed ID cx due to lack of parking and frustration aware to make another appointment. Will return next Friday for bx with Dr Zaragoza.      Review of Systems      Objective:        Physical Exam    Vitals:    07/26/22 0832   BP: (!) 142/89   Pulse: 99   Temp: 97.2 °F (36.2 °C)       Assessment:         No diagnosis found.             Plan:                No orders of the defined types were placed in this encounter.       Follow up in about 1 week (around 8/2/2022) for Bone bx with Mila.

## 2022-07-26 NOTE — PROGRESS NOTES
[facility]                Progress Note    Subjective:       Patient ID: Hany Guerin is a 46 y.o. male.    Chief Complaint: Wound Check    Walked to clinic with no complaints pain. Did not see ID yesterday aware to reschedule. Wounds bigger but no slough. Toe wound has 2 punctures in wound and original wound that were measured as cluster. Plantar wound feel bone but unable to visualize. Aware to come next Friday to see Dr Zaragoza for bone bx due to Osteo on MRI. Drainage was contained within Mextra and oderless, serous/sang in nature.    MD note:  Patient following up for diabetic foot ulcer.  He did miss ID yesterday due to not finding parking.  There has been no fevers, chills, or sweats.  He has been on his feet a lot this past week.  No pain in wound.  No fevers, chills, or sweats.  a1c showed his diabetes to be poorly controlled.        Review of Systems   Constitutional: Negative.    HENT: Negative.    Eyes: Negative.    Cardiovascular: Negative.    Gastrointestinal: Negative.    Skin: Positive for wound.   Psychiatric/Behavioral: Negative.          Objective:        Physical Exam  Constitutional:       Appearance: Normal appearance.   HENT:      Head: Normocephalic and atraumatic.      Right Ear: External ear normal.      Left Ear: External ear normal.      Mouth/Throat:      Mouth: Mucous membranes are moist.      Pharynx: Oropharynx is clear.   Eyes:      Extraocular Movements: Extraocular movements intact.      Conjunctiva/sclera: Conjunctivae normal.      Pupils: Pupils are equal, round, and reactive to light.   Cardiovascular:      Rate and Rhythm: Normal rate and regular rhythm.   Pulmonary:      Effort: Pulmonary effort is normal. No respiratory distress.   Abdominal:      General: There is no distension.      Palpations: Abdomen is soft.   Skin:     General: Skin is warm.      Comments: +left foot DFU with some slough and maceration   Neurological:       Mental Status: He is alert and oriented to person, place, and time. Mental status is at baseline.           Vitals:    07/26/22 0832   BP: (!) 142/89   Pulse: 99   Temp: 97.2 °F (36.2 °C)     Debridement    Date/Time: 7/26/2022 7:54 AM  Performed by: Lyle Thorne MD  Authorized by: Lyle Thorne MD     Consent Done?:  Yes (Written)    Preparation: Patient was prepped and draped in usual sterile fashion    Local anesthesia used?: Yes    Local anesthetic:  Topical anesthetic  Anesthetic total (ml):  10    Wound Details:    Location:  Left foot    Location:  Left Plantar    Type of Debridement:  Excisional       Length (cm):  1.5       Area (sq cm):  3       Width (cm):  2       Percent Debrided (%):  100       Depth (cm):  1.3       Total Area Debrided (sq cm):  3    Depth of debridement:  Subcutaneous tissue    Tissue debrided:  Epidermis, Dermis and Subcutaneous    Devitalized tissue debrided:  Biofilm, Fibrin and Slough    Instruments:  Curette    Bleeding:  Minimal  Hemostasis Achieved: Yes    Method Used:  Pressure  Patient tolerance:  Patient tolerated the procedure well with no immediate complications      Assessment:           ICD-10-CM ICD-9-CM   1. 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 Type 2 diabetes mellitus with foot ulcer, with long-term current use of insulin  E11.621 250.80    L97.509 707.15    Z79.4 V58.67            Wound 07/12/22 0851 Diabetic Ulcer plantar;lateral Foot (Active)   07/12/22 0851    Pre-existing: Yes   Primary Wound Type: Diabetic ulc   Side:    Orientation: plantar;lateral   Location: Foot   Wound Number:    Ankle-Brachial Index:    Pulses:    Removal Indication and Assessment:    Wound Outcome:    (Retired) Wound Type:    (Retired) Wound Length (cm):    (Retired) Wound Width (cm):    (Retired) Depth (cm):    Wound Description (Comments):    Removal  Indications:    Wound Image   07/26/22 0800   Wound WDL ex 07/26/22 0800   Dressing Appearance Saturated 07/26/22 0800   Drainage Amount Large 07/26/22 0800   Drainage Characteristics/Odor Sanguineous 07/26/22 0800   Appearance Red 07/26/22 0800   Tissue loss description Full thickness 07/26/22 0800   Red (%), Wound Tissue Color 100 % 07/26/22 0800   Wound Length (cm) 1.5 cm 07/26/22 0800   Wound Width (cm) 2 cm 07/26/22 0800   Wound Depth (cm) 1.3 cm 07/26/22 0800   Wound Volume (cm^3) 3.9 cm^3 07/26/22 0800   Wound Surface Area (cm^2) 3 cm^2 07/26/22 0800   Care Cleansed with:;Antimicrobial agent;Sterile normal saline 07/26/22 0800   Dressing Changed 07/26/22 0800   Periwound Care Skin barrier film applied 07/26/22 0800   Off Loading Football dressing;Off loading shoe 07/26/22 0800   Dressing Change Due 08/05/22 07/26/22 0800            Wound 07/12/22 0852 Diabetic Ulcer Left lateral Toe, fifth (Active)   07/12/22 0852    Pre-existing: Yes   Primary Wound Type: Diabetic ulc   Side: Left   Orientation: lateral   Location: Toe, fifth   Wound Number:    Ankle-Brachial Index:    Pulses:    Removal Indication and Assessment:    Wound Outcome:    (Retired) Wound Type:    (Retired) Wound Length (cm):    (Retired) Wound Width (cm):    (Retired) Depth (cm):    Wound Description (Comments):    Removal Indications:    Wound Image   07/26/22 0800   Wound WDL ex 07/26/22 0800   Dressing Appearance Dried drainage 07/26/22 0800   Drainage Amount Large 07/26/22 0800   Drainage Characteristics/Odor Serosanguineous 07/26/22 0800   Appearance Red 07/26/22 0800   Red (%), Wound Tissue Color 100 % 07/26/22 0800   Periwound Area Edematous;Macerated 07/26/22 0800   Wound Length (cm) 2.5 cm 07/26/22 0800   Wound Width (cm) 2 cm 07/26/22 0800   Wound Depth (cm) 0.4 cm 07/26/22 0800   Wound Volume (cm^3) 2 cm^3 07/26/22 0800   Wound Surface Area (cm^2) 5 cm^2 07/26/22 0800           Plan:                Orders Placed This Encounter    Procedures    Debridement     This order was created via procedure documentation     Standing Status:   Standing     Number of Occurrences:   1    CULTURE, TISSUE    Change dressing     Comments    Clean wound with NS. Gent Selena & Benzoin to periwound. Hydrofera blue Transfer to wound bed, Drawtex, mextra; secure with medipore tape. Foam. Football drsg (cast padding x3, coban). Darco.        Follow up in about 1 week (around 8/2/2022) for Bone bx with Mila.     Lyle Thorne MD

## 2022-07-27 DIAGNOSIS — Z11.59 NEED FOR HEPATITIS C SCREENING TEST: ICD-10-CM

## 2022-07-27 DIAGNOSIS — E11.9 TYPE 2 DIABETES MELLITUS WITHOUT COMPLICATION, UNSPECIFIED WHETHER LONG TERM INSULIN USE: ICD-10-CM

## 2022-07-27 DIAGNOSIS — Z12.11 COLON CANCER SCREENING: ICD-10-CM

## 2022-07-27 DIAGNOSIS — E11.9 TYPE 2 DIABETES MELLITUS WITHOUT COMPLICATION: ICD-10-CM

## 2022-07-30 LAB
BACTERIA TISS AEROBE CULT: NO GROWTH
GRAM STN SPEC: NORMAL
GRAM STN SPEC: NORMAL

## 2022-08-05 ENCOUNTER — HOSPITAL ENCOUNTER (OUTPATIENT)
Dept: WOUND CARE | Facility: HOSPITAL | Age: 47
Discharge: HOME | End: 2022-08-05
Attending: PODIATRIST
Payer: COMMERCIAL

## 2022-08-05 VITALS — DIASTOLIC BLOOD PRESSURE: 79 MMHG | TEMPERATURE: 98 F | SYSTOLIC BLOOD PRESSURE: 140 MMHG | HEART RATE: 99 BPM

## 2022-08-05 DIAGNOSIS — L97.509 TYPE 2 DIABETES MELLITUS WITH FOOT ULCER, WITH LONG-TERM CURRENT USE OF INSULIN: ICD-10-CM

## 2022-08-05 DIAGNOSIS — Z79.4 TYPE 2 DIABETES MELLITUS WITH FOOT ULCER, WITH LONG-TERM CURRENT USE OF INSULIN: ICD-10-CM

## 2022-08-05 DIAGNOSIS — E11.621 TYPE 2 DIABETES MELLITUS WITH FOOT ULCER, WITH LONG-TERM CURRENT USE OF INSULIN: ICD-10-CM

## 2022-08-05 DIAGNOSIS — L97.426 DIABETIC ULCER OF LEFT MIDFOOT ASSOCIATED WITH TYPE 2 DIABETES MELLITUS, WITH BONE INVOLVEMENT WITHOUT EVIDENCE OF NECROSIS: ICD-10-CM

## 2022-08-05 DIAGNOSIS — E11.621 DIABETIC ULCER OF LEFT MIDFOOT ASSOCIATED WITH TYPE 2 DIABETES MELLITUS, WITH BONE INVOLVEMENT WITHOUT EVIDENCE OF NECROSIS: ICD-10-CM

## 2022-08-05 DIAGNOSIS — M86.272 SUBACUTE OSTEOMYELITIS OF LEFT FOOT: Primary | ICD-10-CM

## 2022-08-05 PROCEDURE — 20220 BONE BIOPSY TROCAR/NDL SUPFC: CPT

## 2022-08-05 PROCEDURE — 99204 PR OFFICE/OUTPT VISIT, NEW, LEVL IV, 45-59 MIN: ICD-10-PCS | Mod: 25,,, | Performed by: PODIATRIST

## 2022-08-05 PROCEDURE — 99204 OFFICE O/P NEW MOD 45 MIN: CPT | Mod: 25,,, | Performed by: PODIATRIST

## 2022-08-05 PROCEDURE — 87116 MYCOBACTERIA CULTURE: CPT | Performed by: PODIATRIST

## 2022-08-05 PROCEDURE — 87075 CULTR BACTERIA EXCEPT BLOOD: CPT | Performed by: PODIATRIST

## 2022-08-05 PROCEDURE — 20220 PR BONE BIOPSY,TROCAR/NEEDLE SUPERF: ICD-10-PCS | Mod: ,,, | Performed by: PODIATRIST

## 2022-08-05 PROCEDURE — 20225 BONE BIOPSY TROCAR/NDL DEEP: CPT | Performed by: PODIATRIST

## 2022-08-05 PROCEDURE — 87070 CULTURE OTHR SPECIMN AEROBIC: CPT | Performed by: PODIATRIST

## 2022-08-05 PROCEDURE — 88305 TISSUE EXAM BY PATHOLOGIST: CPT | Mod: 26,,, | Performed by: PATHOLOGY

## 2022-08-05 PROCEDURE — 88305 TISSUE EXAM BY PATHOLOGIST: CPT | Performed by: PATHOLOGY

## 2022-08-05 PROCEDURE — 20220 BONE BIOPSY TROCAR/NDL SUPFC: CPT | Mod: ,,, | Performed by: PODIATRIST

## 2022-08-05 PROCEDURE — 87206 SMEAR FLUORESCENT/ACID STAI: CPT | Performed by: PODIATRIST

## 2022-08-05 PROCEDURE — 87102 FUNGUS ISOLATION CULTURE: CPT | Performed by: PODIATRIST

## 2022-08-05 PROCEDURE — 88305 TISSUE EXAM BY PATHOLOGIST: ICD-10-PCS | Mod: 26,,, | Performed by: PATHOLOGY

## 2022-08-05 RX ORDER — DOXYCYCLINE 100 MG/1
100 CAPSULE ORAL 2 TIMES DAILY
Qty: 20 CAPSULE | Refills: 0 | Status: SHIPPED | OUTPATIENT
Start: 2022-08-05

## 2022-08-05 NOTE — PROGRESS NOTES
"[facility]                Progress Note    Subjective:       Patient ID: Hany Guerin is a 46 y.o. male.    Chief Complaint: Wound Check    Follow up wound care visit. Patient ambulated to room unaided, with prescribed Darco shoe on Left Foot. No c/o pain at present. Dressing removed is guaze wrapping with Coban layer, he reports "having to change dressing because it became wet a few days ago". Drainage is moderate amounts of sanguineous and serosanguineous, non-malodor noted. Left Plantar/Later Foot wound measuring smaller in (0.8 cm) length and (1.4 cm) width. Left 5th Lateral Toe wound measuring smaller in (1.8 cm) length and (0.5 cm) width, but increased in (0.6 cm) depth.    MD took biopsy of bone in Left Foot - sent for pathology and cultures. Wound care done as per order.  Return to clinic in 1 week.         Review of Systems   Constitutional: Positive for activity change.   Respiratory: Negative for shortness of breath.    Cardiovascular: Negative for chest pain and leg swelling.   Gastrointestinal: Negative for nausea and vomiting.   Musculoskeletal: Positive for arthralgias.   Skin: Positive for wound.   Neurological: Positive for numbness. Negative for weakness.         Objective:        Physical Exam  Constitutional:       Appearance: He is well-developed.      Comments: Oriented to time, place, and person.   Cardiovascular:      Comments: DP and PT pulses are palpable bilaterally. 3 sec capillary refill time and toes and feet are warm to touch proximally .  There is  hair growth on the feet and toes b/l. There is no edema b/l. No spider veins or varicosities present b/l.     Musculoskeletal:      Comments: Equinus noted b/l ankles with < 10 deg DF noted. MMT 5/5 in DF/PF/Inv/Ev resistance with no reproduction of pain in any direction. Passive range of motion of ankle and pedal joints is painless b/l.     Feet:      Right foot:      Skin integrity: " No callus or dry skin.      Left foot:      Skin integrity: No callus or dry skin.   Lymphadenopathy:      Comments: Negative lymphadenopathy bilateral popliteal fossa and tarsal tunnel.   Skin:     Comments: See wound description      Neurological:      Mental Status: He is alert.      Comments: Light touch, proprioception, and sharp/dull sensation are all intact bilaterally. Protective threshold with the Milton-Wienstein monofilament is intact bilaterally.    Psychiatric:         Behavior: Behavior is cooperative.         Vitals:    08/05/22 1115   BP: (!) 140/79   Pulse: 99   Temp: 97.7 °F (36.5 °C)       Assessment:           ICD-10-CM ICD-9-CM   1. Subacute osteomyelitis of left foot  M86.272 730.07   2. 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 Type 2 diabetes mellitus with foot ulcer, with long-term current use of insulin  E11.621 250.80    L97.509 707.15    Z79.4 V58.67            Wound 07/12/22 0851 Diabetic Ulcer plantar;lateral Foot (Active)   07/12/22 0851    Pre-existing: Yes   Primary Wound Type: Diabetic ulc   Side:    Orientation: plantar;lateral   Location: Foot   Wound Number:    Ankle-Brachial Index:    Pulses:    Removal Indication and Assessment:    Wound Outcome:    (Retired) Wound Type:    (Retired) Wound Length (cm):    (Retired) Wound Width (cm):    (Retired) Depth (cm):    Wound Description (Comments):    Removal Indications:    Wound Image   08/05/22 1100   Wound WDL ex 08/05/22 1100   Dressing Appearance Intact;Moist drainage 08/05/22 1100   Drainage Amount Moderate 08/05/22 1100   Drainage Characteristics/Odor Serosanguineous 08/05/22 1100   Appearance Red;Moist 08/05/22 1100   Tissue loss description Partial thickness 08/05/22 1100   Black (%), Wound Tissue Color 0 % 08/05/22 1100   Red (%), Wound Tissue Color 100 % 08/05/22 1100   Yellow (%), Wound Tissue Color 0 %  08/05/22 1100   Periwound Area Macerated;Moist;Pale white 08/05/22 1100   Wound Edges Defined 08/05/22 1100   Wound Length (cm) 0.7 cm 08/05/22 1100   Wound Width (cm) 0.6 cm 08/05/22 1100   Wound Depth (cm) 1 cm 08/05/22 1100   Wound Volume (cm^3) 0.42 cm^3 08/05/22 1100   Wound Surface Area (cm^2) 0.42 cm^2 08/05/22 1100   Care Cleansed with:;Antimicrobial agent;Sterile normal saline 08/05/22 1100   Dressing Changed 08/05/22 1100            Wound 07/12/22 0852 Diabetic Ulcer Left lateral Toe, fifth (Active)   07/12/22 0852    Pre-existing: Yes   Primary Wound Type: Diabetic ulc   Side: Left   Orientation: lateral   Location: Toe, fifth   Wound Number:    Ankle-Brachial Index:    Pulses:    Removal Indication and Assessment:    Wound Outcome:    (Retired) Wound Type:    (Retired) Wound Length (cm):    (Retired) Wound Width (cm):    (Retired) Depth (cm):    Wound Description (Comments):    Removal Indications:    Wound Image   08/05/22 1100   Wound WDL ex 08/05/22 1100   Dressing Appearance Intact;Moist drainage 08/05/22 1100   Drainage Amount Moderate 08/05/22 1100   Drainage Characteristics/Odor Sanguineous;Bleeding controlled 08/05/22 1100   Appearance Red;Moist 08/05/22 1100   Tissue loss description Partial thickness 08/05/22 1100   Black (%), Wound Tissue Color 0 % 08/05/22 1100   Red (%), Wound Tissue Color 100 % 08/05/22 1100   Yellow (%), Wound Tissue Color 0 % 08/05/22 1100   Periwound Area Pale white 08/05/22 1100   Wound Edges Open 08/05/22 1100   Wound Length (cm) 0.7 cm 08/05/22 1100   Wound Width (cm) 1.5 cm 08/05/22 1100   Wound Depth (cm) 0.5 cm 08/05/22 1100   Wound Volume (cm^3) 0.525 cm^3 08/05/22 1100   Wound Surface Area (cm^2) 1.05 cm^2 08/05/22 1100   Care Cleansed with:;Antimicrobial agent;Sterile normal saline 08/05/22 1100   Dressing Changed 08/05/22 1100           Plan:            Rx. Doxycycline     - Shoe inspection. Diabetic Foot Education. Patient reminded of the importance of good  nutrition and blood sugar control to help prevent podiatric complications of diabetes. Patient instructed on proper foot hygeine. We discussed wearing proper shoe gear, daily foot inspections, never walking without protective shoe gear, caution putting sharp instruments to feet     - Discussed DM foot care:  Wear comfortable, proper fitting shoes. Wash feet daily. Dry well. After drying, apply moisturizer to feet (no lotion to webspaces). Inspect feet daily for skin breaks, blisters, swelling, or redness. Wear cotton socks (preferably white)  Change socks every day. Do NOT walk barefoot. Do NOT use heating pads or warm/hot water soaks     Bone Biopsy    Written consent obtained from patient. Time out performed to verify correct site was identified prior to initiation of procedure.    The patient was see in exam room where 3cc 2% Lido P local anesthetic was injected into the left lateral foot. The patient was prepped and draped in the usual sterile fashion. A jamshidi needle was used to obtain bone from left 5th metatarsal head. The specimen was sent to Pathology and for culture and sensitivity. Hemostasis was achieved with direct pressure. The site was covered with DSD    Attending: Dr. Cordova DPM  Assistant: None   Estimated blood loss: <5 mL  Specimen removed: Bone of left 5th metatarsal head.       Orders Placed This Encounter   Procedures    Aerobic culture (Specify Source)    CULTURE, ANAEROBE    AFB CULTURE & SMEAR    CULTURE, FUNGUS    Change dressing     Clean wound with NS. Gent Selena & Benzoin to periwound. Erica then Hydrofera blue Transfer to wound bed, Drawtex, Mextra. Grady Foam (reg x1 and long x 1). Football drsg (cast padding x3, Coban). Darco.        Follow up in about 6 days (around 8/11/2022) for wound care.

## 2022-08-09 LAB
BACTERIA SPEC AEROBE CULT: NO GROWTH
BACTERIA SPEC ANAEROBE CULT: NORMAL
FINAL PATHOLOGIC DIAGNOSIS: NORMAL
GROSS: NORMAL
Lab: NORMAL

## 2022-08-11 ENCOUNTER — HOSPITAL ENCOUNTER (OUTPATIENT)
Dept: WOUND CARE | Facility: HOSPITAL | Age: 47
Discharge: HOME | End: 2022-08-11
Attending: FAMILY MEDICINE
Payer: COMMERCIAL

## 2022-08-11 VITALS — HEART RATE: 98 BPM | DIASTOLIC BLOOD PRESSURE: 81 MMHG | TEMPERATURE: 98 F | SYSTOLIC BLOOD PRESSURE: 154 MMHG

## 2022-08-11 DIAGNOSIS — E11.621 DIABETIC ULCER OF LEFT MIDFOOT ASSOCIATED WITH TYPE 2 DIABETES MELLITUS, WITH BONE INVOLVEMENT WITHOUT EVIDENCE OF NECROSIS: Primary | ICD-10-CM

## 2022-08-11 DIAGNOSIS — E11.621 TYPE 2 DIABETES MELLITUS WITH FOOT ULCER, WITH LONG-TERM CURRENT USE OF INSULIN: ICD-10-CM

## 2022-08-11 DIAGNOSIS — L97.426 DIABETIC ULCER OF LEFT MIDFOOT ASSOCIATED WITH TYPE 2 DIABETES MELLITUS, WITH BONE INVOLVEMENT WITHOUT EVIDENCE OF NECROSIS: Primary | ICD-10-CM

## 2022-08-11 DIAGNOSIS — L97.509 TYPE 2 DIABETES MELLITUS WITH FOOT ULCER, WITH LONG-TERM CURRENT USE OF INSULIN: ICD-10-CM

## 2022-08-11 DIAGNOSIS — Z79.4 TYPE 2 DIABETES MELLITUS WITH FOOT ULCER, WITH LONG-TERM CURRENT USE OF INSULIN: ICD-10-CM

## 2022-08-11 DIAGNOSIS — M86.272 SUBACUTE OSTEOMYELITIS OF LEFT FOOT: ICD-10-CM

## 2022-08-11 PROCEDURE — 11042 DBRDMT SUBQ TIS 1ST 20SQCM/<: CPT | Performed by: FAMILY MEDICINE

## 2022-08-11 PROCEDURE — 11042 DBRDMT SUBQ TIS 1ST 20SQCM/<: CPT | Mod: ,,, | Performed by: FAMILY MEDICINE

## 2022-08-11 PROCEDURE — 99214 OFFICE O/P EST MOD 30 MIN: CPT | Mod: 25,,, | Performed by: FAMILY MEDICINE

## 2022-08-11 PROCEDURE — 11042 DEBRIDEMENT: ICD-10-PCS | Mod: ,,, | Performed by: FAMILY MEDICINE

## 2022-08-11 PROCEDURE — 99214 OFFICE O/P EST MOD 30 MIN: CPT | Performed by: FAMILY MEDICINE

## 2022-08-11 PROCEDURE — 99214 PR OFFICE/OUTPT VISIT, EST, LEVL IV, 30-39 MIN: ICD-10-PCS | Mod: 25,,, | Performed by: FAMILY MEDICINE

## 2022-08-11 NOTE — PROGRESS NOTES
[facility]                Progress Note    Subjective:       Patient ID: Hany Guerin is a 46 y.o. male.    Chief Complaint: Wound Check    Follow up wound care visit. Patient ambulated to room unaided, with prescribed Darco shoe on Left Foot. No c/o pain at present. Dressing in place with 1st-3rd toes peaking through Coban layer of dressing. Drainage amount is moderate, serosanguineous, and non-malodor. Left Lateral/Plantar Foot wound measuring larger in (0.1 cm) length and smaller in (0.8 cm) depth. Left 5th Lateral Toe wound measuring smaller in (0.2 cm) length and (0.2 cm) width, but increased in (0.3 cm) depth - MD debrided away macerated skin connecting openings to allow for better drainage and healing.      Wound care done as per order.  Return to clinic on Tuesday 08/16.        Review of Systems   Constitutional: Negative.    HENT: Negative.    Eyes: Negative.    Respiratory: Negative.    Cardiovascular: Negative.    Musculoskeletal: Negative.    Skin: Positive for wound.   Neurological: Negative.    Hematological: Negative.    All other systems reviewed and are negative.        Objective:        Physical Exam  Vitals reviewed.   Constitutional:       Appearance: He is well-developed.   HENT:      Head: Normocephalic and atraumatic.   Eyes:      Conjunctiva/sclera: Conjunctivae normal.      Pupils: Pupils are equal, round, and reactive to light.   Musculoskeletal:      Cervical back: Normal range of motion.   Skin:     General: Skin is warm and dry.      Comments: Please see wound description   Neurological:      Mental Status: He is alert and oriented to person, place, and time.   Psychiatric:         Behavior: Behavior normal.           Vitals:    08/11/22 1016   BP: (!) 154/81   Pulse: 98   Temp: 97.8 °F (36.6 °C)     Debridement    Date/Time: 8/11/2022 10:00 AM  Performed by: Hayley Galvan MD  Authorized by: Hayley Galvan MD     Time out:  "Immediately prior to procedure a "time out" was called to verify the correct patient, procedure, equipment, support staff and site/side marked as required.    Consent Done?:  Yes (Written)  Local anesthesia used?: No      Wound Details:    Location:  Left foot    Location:  Left 5th Metatarsal Head    Type of Debridement:  Excisional       Length (cm):  0.5       Area (sq cm):  0.65       Width (cm):  1.3       Percent Debrided (%):  100       Depth (cm):  0.8       Total Area Debrided (sq cm):  0.65    Depth of debridement:  Subcutaneous tissue    Instruments:  Scissors    Bleeding:  Minimal  Hemostasis Achieved: Yes    Method Used:  Pressure  Patient tolerance:  Patient tolerated the procedure well with no immediate complications        Assessment:           ICD-10-CM ICD-9-CM   1. 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 Type 2 diabetes mellitus with foot ulcer, with long-term current use of insulin  E11.621 250.80    L97.509 707.15    Z79.4 V58.67   3. Subacute osteomyelitis of left foot  M86.272 730.07            Wound 07/12/22 0851 Diabetic Ulcer plantar;lateral Foot (Active)   07/12/22 0851    Pre-existing: Yes   Primary Wound Type: Diabetic ulc   Side:    Orientation: plantar;lateral   Location: Foot   Wound Number:    Ankle-Brachial Index:    Pulses:    Removal Indication and Assessment:    Wound Outcome:    (Retired) Wound Type:    (Retired) Wound Length (cm):    (Retired) Wound Width (cm):    (Retired) Depth (cm):    Wound Description (Comments):    Removal Indications:    Wound Image   08/11/22 1000   Wound WDL ex 08/11/22 1000   Dressing Appearance Intact;Moist drainage 08/11/22 1000   Drainage Amount Small 08/11/22 1000   Drainage Characteristics/Odor Serosanguineous 08/11/22 1000   Appearance Intact 08/11/22 1000   Tissue loss description Partial thickness 08/11/22 1000   Black " (%), Wound Tissue Color 0 % 08/11/22 1000   Red (%), Wound Tissue Color 100 % 08/11/22 1000   Yellow (%), Wound Tissue Color 0 % 08/11/22 1000   Periwound Area Macerated;Moist;Pale white 08/11/22 1000   Wound Edges Defined 08/11/22 1000   Wound Length (cm) 0.8 cm 08/11/22 1000   Wound Width (cm) 0.6 cm 08/11/22 1000   Wound Depth (cm) 0.2 cm 08/11/22 1000   Wound Volume (cm^3) 0.096 cm^3 08/11/22 1000   Wound Surface Area (cm^2) 0.48 cm^2 08/11/22 1000   Tunneling (depth (cm)/location) 1 08/11/22 1000   Undermining (depth (cm)/location) 0.5 cm circumference 08/11/22 1000   Care Cleansed with:;Antimicrobial agent;Sterile normal saline 08/11/22 1000   Dressing Changed 08/11/22 1000   Periwound Care Moisture barrier applied 08/11/22 1000   Off Loading Football dressing;Off loading shoe 08/11/22 1000   Dressing Change Due 08/16/22 08/11/22 1000            Wound 07/12/22 0852 Diabetic Ulcer Left lateral Toe, fifth (Active)   07/12/22 0852    Pre-existing: Yes   Primary Wound Type: Diabetic ulc   Side: Left   Orientation: lateral   Location: Toe, fifth   Wound Number:    Ankle-Brachial Index:    Pulses:    Removal Indication and Assessment:    Wound Outcome:    (Retired) Wound Type:    (Retired) Wound Length (cm):    (Retired) Wound Width (cm):    (Retired) Depth (cm):    Wound Description (Comments):    Removal Indications:    Wound Image    08/11/22 1000   Wound WDL ex 08/11/22 1000   Dressing Appearance Intact;Moist drainage 08/11/22 1000   Drainage Amount Moderate 08/11/22 1000   Drainage Characteristics/Odor Sanguineous;Bleeding controlled;No odor 08/11/22 1000   Appearance Red;Hypergranulation 08/11/22 1000   Tissue loss description Full thickness 08/11/22 1000   Black (%), Wound Tissue Color 0 % 08/11/22 1000   Red (%), Wound Tissue Color 100 % 08/11/22 1000   Yellow (%), Wound Tissue Color 0 % 08/11/22 1000   Periwound Area Macerated;Moist;Pale white 08/11/22 1000   Wound Edges Defined 08/11/22 1000   Wound Length  (cm) 0.5 cm 08/11/22 1000   Wound Width (cm) 1.3 cm 08/11/22 1000   Wound Depth (cm) 0.8 cm 08/11/22 1000   Wound Volume (cm^3) 0.52 cm^3 08/11/22 1000   Wound Surface Area (cm^2) 0.65 cm^2 08/11/22 1000   Tunneling (depth (cm)/location) 1 cm @ 9 o'clock 08/11/22 1000   Care Cleansed with:;Antimicrobial agent;Sterile normal saline 08/11/22 1000   Dressing Changed 08/11/22 1000   Periwound Care Moisture barrier applied 08/11/22 1000   Off Loading Football dressing;Off loading shoe 08/11/22 1000   Dressing Change Due 08/16/22 08/11/22 1000           Plan:          1. Debridement needed and Done today.   2. Continue off-loading / leg elevation   3. Continue eating protein   4. Keep dressing clean and intact  5. Continue with wound care orders and plan as noted in orders.   6. Continue to follow current medication regimen as per pcp   7. Call for any questions / concerns.           Orders Placed This Encounter   Procedures    Change dressing     Clean wound with NS. Gentian Violet to periwound. Erica then Hydrofera Blue transfer to wound bed, Drawtex (cut to size), Mextra (short x 1). Grady Foam (long x 2, laid perpendicular). Football dressing (cast padding x3, Coban). Darco.        Follow up in about 1 week (around 8/18/2022) for wound care.

## 2022-08-16 ENCOUNTER — TELEPHONE (OUTPATIENT)
Dept: WOUND CARE | Facility: HOSPITAL | Age: 47
End: 2022-08-16
Payer: COMMERCIAL

## 2022-08-16 NOTE — TELEPHONE ENCOUNTER
Called patient regarding appt at 9am, phone went straight to voicemail. Unable to leave message due to VM being full. MD notified.

## 2022-08-17 ENCOUNTER — OFFICE VISIT (OUTPATIENT)
Dept: FAMILY MEDICINE | Facility: CLINIC | Age: 47
End: 2022-08-17
Payer: COMMERCIAL

## 2022-08-17 ENCOUNTER — HOSPITAL ENCOUNTER (OUTPATIENT)
Dept: RADIOLOGY | Facility: HOSPITAL | Age: 47
Discharge: HOME | End: 2022-08-17
Attending: NURSE PRACTITIONER
Payer: COMMERCIAL

## 2022-08-17 ENCOUNTER — TELEPHONE (OUTPATIENT)
Dept: FAMILY MEDICINE | Facility: CLINIC | Age: 47
End: 2022-08-17

## 2022-08-17 ENCOUNTER — TELEPHONE (OUTPATIENT)
Dept: FAMILY MEDICINE | Facility: CLINIC | Age: 47
End: 2022-08-17
Payer: COMMERCIAL

## 2022-08-17 VITALS
TEMPERATURE: 100 F | WEIGHT: 205.94 LBS | HEIGHT: 66 IN | HEART RATE: 110 BPM | BODY MASS INDEX: 33.1 KG/M2 | OXYGEN SATURATION: 99 %

## 2022-08-17 DIAGNOSIS — R29.898 RIGHT HAND WEAKNESS: ICD-10-CM

## 2022-08-17 DIAGNOSIS — M25.539 PAIN IN WRIST, UNSPECIFIED LATERALITY: Primary | ICD-10-CM

## 2022-08-17 DIAGNOSIS — R20.2 NUMBNESS AND TINGLING OF HAND: Primary | ICD-10-CM

## 2022-08-17 DIAGNOSIS — L97.426 DIABETIC ULCER OF LEFT MIDFOOT ASSOCIATED WITH TYPE 2 DIABETES MELLITUS, WITH BONE INVOLVEMENT WITHOUT EVIDENCE OF NECROSIS: ICD-10-CM

## 2022-08-17 DIAGNOSIS — R20.0 NUMBNESS AND TINGLING OF HAND: Primary | ICD-10-CM

## 2022-08-17 DIAGNOSIS — E11.621 DIABETIC ULCER OF LEFT MIDFOOT ASSOCIATED WITH TYPE 2 DIABETES MELLITUS, WITH BONE INVOLVEMENT WITHOUT EVIDENCE OF NECROSIS: ICD-10-CM

## 2022-08-17 PROCEDURE — 73130 XR HAND COMPLETE 3 VIEW RIGHT: ICD-10-PCS | Mod: 26,RT,, | Performed by: RADIOLOGY

## 2022-08-17 PROCEDURE — 4010F PR ACE/ARB THEARPY RXD/TAKEN: ICD-10-PCS | Mod: CPTII,S$GLB,, | Performed by: NURSE PRACTITIONER

## 2022-08-17 PROCEDURE — 73130 X-RAY EXAM OF HAND: CPT | Mod: TC,FY,PO,RT

## 2022-08-17 PROCEDURE — 99214 PR OFFICE/OUTPT VISIT, EST, LEVL IV, 30-39 MIN: ICD-10-PCS | Mod: S$GLB,,, | Performed by: NURSE PRACTITIONER

## 2022-08-17 PROCEDURE — 1160F RVW MEDS BY RX/DR IN RCRD: CPT | Mod: CPTII,S$GLB,, | Performed by: NURSE PRACTITIONER

## 2022-08-17 PROCEDURE — 99999 PR PBB SHADOW E&M-EST. PATIENT-LVL IV: ICD-10-PCS | Mod: PBBFAC,,, | Performed by: NURSE PRACTITIONER

## 2022-08-17 PROCEDURE — 99999 PR PBB SHADOW E&M-EST. PATIENT-LVL IV: CPT | Mod: PBBFAC,,, | Performed by: NURSE PRACTITIONER

## 2022-08-17 PROCEDURE — 1160F PR REVIEW ALL MEDS BY PRESCRIBER/CLIN PHARMACIST DOCUMENTED: ICD-10-PCS | Mod: CPTII,S$GLB,, | Performed by: NURSE PRACTITIONER

## 2022-08-17 PROCEDURE — 1159F PR MEDICATION LIST DOCUMENTED IN MEDICAL RECORD: ICD-10-PCS | Mod: CPTII,S$GLB,, | Performed by: NURSE PRACTITIONER

## 2022-08-17 PROCEDURE — 3008F BODY MASS INDEX DOCD: CPT | Mod: CPTII,S$GLB,, | Performed by: NURSE PRACTITIONER

## 2022-08-17 PROCEDURE — 3008F PR BODY MASS INDEX (BMI) DOCUMENTED: ICD-10-PCS | Mod: CPTII,S$GLB,, | Performed by: NURSE PRACTITIONER

## 2022-08-17 PROCEDURE — 99214 OFFICE O/P EST MOD 30 MIN: CPT | Mod: S$GLB,,, | Performed by: NURSE PRACTITIONER

## 2022-08-17 PROCEDURE — 73130 X-RAY EXAM OF HAND: CPT | Mod: 26,RT,, | Performed by: RADIOLOGY

## 2022-08-17 PROCEDURE — 4010F ACE/ARB THERAPY RXD/TAKEN: CPT | Mod: CPTII,S$GLB,, | Performed by: NURSE PRACTITIONER

## 2022-08-17 PROCEDURE — 3046F PR MOST RECENT HEMOGLOBIN A1C LEVEL > 9.0%: ICD-10-PCS | Mod: CPTII,S$GLB,, | Performed by: NURSE PRACTITIONER

## 2022-08-17 PROCEDURE — 1159F MED LIST DOCD IN RCRD: CPT | Mod: CPTII,S$GLB,, | Performed by: NURSE PRACTITIONER

## 2022-08-17 PROCEDURE — 3046F HEMOGLOBIN A1C LEVEL >9.0%: CPT | Mod: CPTII,S$GLB,, | Performed by: NURSE PRACTITIONER

## 2022-08-17 RX ORDER — GABAPENTIN 100 MG/1
100 CAPSULE ORAL 3 TIMES DAILY
Qty: 90 CAPSULE | Refills: 11 | Status: SHIPPED | OUTPATIENT
Start: 2022-08-17 | End: 2023-08-17

## 2022-08-17 NOTE — PROGRESS NOTES
"Chief Complaint  Chief Complaint   Patient presents with    Numbness     Hand numbness in right hand. Since Monday.        HPI    HPI   Mr. Hany Guerin is a 46 y.o. male with medical problems as listed below. The patient presents to clinic with c/o RIGHT hand numbness since Monday. He states that he went to sleep normal and "now my hand does work". Has been having weakness, tingling and numbness in the hand. Has never had anything like this happen before. Denies any numbness, pain or tingling above the wrist. Denies any facial numbness, facial drop, confusion or any other red flag symptoms.    Suffers from chronic back pain. Does not have any today.    Is currently on doxy for a wound infection on his LEFT foot.     He is also a chronic smoker and also has diabetes.    PAST MEDICAL HISTORY:  Past Medical History:   Diagnosis Date    Diabetes mellitus     High cholesterol     Hypertension        PAST SURGICAL HISTORY:  Past Surgical History:   Procedure Laterality Date    CHOLECYSTECTOMY         SOCIAL HISTORY:  Social History     Socioeconomic History    Marital status: Significant Other     Spouse name: pedro    Number of children: 2   Occupational History    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   Tobacco Use    Smoking status: Current Every Day Smoker     Packs/day: 1.00     Start date: 7/12/1994    Smokeless tobacco: Never Used   Substance and Sexual Activity    Alcohol use: Yes     Alcohol/week: 3.0 - 4.0 standard drinks     Types: 3 - 4 Cans of beer per week     Comment: Daily    Drug use: Yes     Frequency: 5.0 times per week     Types: Marijuana    Sexual activity: Yes     Partners: Female   Social History Narrative    Lives with mother in law and girlfriend       FAMILY HISTORY:  Family History   Problem Relation Age of Onset    Hypertension Mother     Diabetes Mother     Hypertension Father     Hypertension Maternal Grandfather     Diabetes Maternal Grandfather     Miscarriages / " Stillbirths Paternal Grandmother     Diabetes Paternal Grandfather     Hypertension Paternal Grandfather        ALLERGIES AND MEDICATIONS: updated and reviewed.  Review of patient's allergies indicates:   Allergen Reactions    Penicillins      Current Outpatient Medications   Medication Sig Dispense Refill    amlodipine-olmesartan (KENZIE) 10-20 mg per tablet Take 1 tablet by mouth once daily. 90 tablet 1    doxycycline (VIBRAMYCIN) 100 MG Cap Take 1 capsule (100 mg total) by mouth 2 (two) times daily. 20 capsule 0    hydroCHLOROthiazide (MICROZIDE) 12.5 mg capsule Take 1 capsule (12.5 mg total) by mouth every morning. 90 capsule 1    semaglutide (OZEMPIC) 1 mg/dose (4 mg/3 mL) Inject 1 mg into the skin every 7 days. 1 pen 2    blood-glucose meter,continuous (DEXCOM G6 ) Misc Use as needed to monitor blood glucose (Patient not taking: Reported on 8/17/2022) 1 each 0    blood-glucose sensor (DEXCOM G6 SENSOR) Alicja Use as needed to monitor blood glucose (Patient not taking: Reported on 8/17/2022) 3 each 12    blood-glucose transmitter (DEXCOM G6 TRANSMITTER) Alicja Use as needed to monitor blood glucose (Patient not taking: Reported on 8/17/2022) 1 each 2    gabapentin (NEURONTIN) 100 MG capsule Take 1 capsule (100 mg total) by mouth 3 (three) times daily. 90 capsule 11    metronidazole 1% (METROGEL) 1 % Gel Apply topically every other day.       No current facility-administered medications for this visit.       Patient Care Team:  Lyle Thorne MD as PCP - General (Family Medicine)    ROS  Review of Systems   Constitutional: Negative for chills, fatigue, fever and unexpected weight change.   HENT: Negative for congestion, ear pain, sore throat and voice change.    Eyes: Negative for photophobia, pain, discharge and visual disturbance.   Respiratory: Negative for cough, shortness of breath and wheezing.    Cardiovascular: Negative for chest pain, palpitations and leg swelling.   Gastrointestinal:  "Negative for abdominal pain, blood in stool, constipation, diarrhea, nausea and vomiting.   Genitourinary: Negative for dysuria and frequency.   Musculoskeletal: Negative for gait problem, joint swelling and neck stiffness.   Skin: Negative for color change and rash.   Neurological: Positive for weakness and numbness. Negative for seizures and headaches.   Hematological: Negative for adenopathy. Does not bruise/bleed easily.   Psychiatric/Behavioral: Negative for behavioral problems and dysphoric mood. The patient is not nervous/anxious.            Physical Exam  Vitals:    08/17/22 1101   Pulse: 110   Temp: 99.6 °F (37.6 °C)   TempSrc: Oral   SpO2: 99%   Weight: 93.4 kg (205 lb 14.6 oz)   Height: 5' 6" (1.676 m)    Body mass index is 33.23 kg/m².  Weight: 93.4 kg (205 lb 14.6 oz)   Height: 5' 6" (167.6 cm)     Physical Exam  Vitals reviewed.   Constitutional:       General: He is not in acute distress.     Appearance: He is well-developed.   HENT:      Head: Normocephalic and atraumatic.   Eyes:      General: No visual field deficit.     Pupils: Pupils are equal, round, and reactive to light.   Cardiovascular:      Rate and Rhythm: Normal rate.      Heart sounds:     No friction rub.   Pulmonary:      Effort: Pulmonary effort is normal.   Abdominal:      Palpations: Abdomen is soft.   Musculoskeletal:         General: Normal range of motion.      Cervical back: Normal range of motion.   Skin:     General: Skin is warm and dry.   Neurological:      Mental Status: He is alert and oriented to person, place, and time.      Cranial Nerves: No cranial nerve deficit, dysarthria or facial asymmetry.      Motor: Weakness (right hand) and abnormal muscle tone present. No tremor.      Coordination: Coordination normal. Finger-Nose-Finger Test and Heel to Shin Test normal.      Gait: Gait and tandem walk normal.         Health Maintenance       Date Due Completion Date    Hepatitis C Screening Never done ---    Diabetes Urine " Screening Never done ---    Pneumococcal Vaccines (Age 0-64) (1 - PCV) Never done ---    Foot Exam Never done ---    Eye Exam Never done ---    HIV Screening Never done ---    Low Dose Statin Never done ---    Lipid Panel 10/10/2018 10/10/2017    Colorectal Cancer Screening Never done ---    COVID-19 Vaccine (2 - Moderna series) 07/06/2022 6/8/2022    Influenza Vaccine (1) 09/01/2022 ---    Hemoglobin A1c 10/19/2022 7/19/2022    TETANUS VACCINE 07/22/2029 7/22/2019      Health maintenance reviewed at this time.     Assessment & Plan  Numbness and tingling of hand  -     gabapentin (NEURONTIN) 100 MG capsule; Take 1 capsule (100 mg total) by mouth 3 (three) times daily.  Dispense: 90 capsule; Refill: 11    Right hand weakness  -     Ambulatory referral/consult to Neurology; Future; Expected date: 08/24/2022  -     X-Ray Hand 3 View Right; Future; Expected date: 08/17/2022      Will give the patient gabapentin for the numbess and tingling.  X-ray to rule of wrist nerve entrapment.    Discussed the role of doxy in tingling and numbness. If due to doxy, symptoms should resolved a few days after the patient is done with the medication.  Will refer to neurology for further evaluation    Diabetic ulcer of left midfoot associated with type 2 diabetes mellitus, with bone involvement without evidence of necrosis  The patient is being followed by wound care. He missed his most recent appointment and does not appear to be taking medication as prescribed. The patient has also not gotten his blood work done. Will help the patient get another appointment and also help him reschedule his labs.    Follow-up: Follow up if symptoms worsen or fail to improve.

## 2022-08-17 NOTE — TELEPHONE ENCOUNTER
----- Message from Brittani Kuhn NP sent at 8/17/2022  2:29 PM CDT -----  Please call patient with their attached x-ray results. His x-ray showed some degenerative changes in the wrist. Due to the numbness and weakness I place a referral to orthopedics for further evaluation.    Thanks   Brittani

## 2022-08-17 NOTE — TELEPHONE ENCOUNTER
----- Message from Leda Olivera sent at 8/17/2022  8:11 AM CDT -----  Regarding: questions before appt this morning at 11  Type: Patient Call Back    Who called:Marysol     What is the request in detail: Marysol, spouse of the patient, is requesting a call back from the nurse of Dr. Thorne on behalf of the patient. She has questions to ask about the patient's appt today with NP Brittani Kuhn at 11am. She stated that the patient is coming in for vomitting and numbness in his hand since Sunday. Please return call at earliest convenience.    Can the clinic reply by DONASNER?no     Would the patient rather a call back or a response via [facility]? Call back     Best call back number:[phone]

## 2022-08-18 ENCOUNTER — OFFICE VISIT (OUTPATIENT)
Dept: NEUROLOGY | Facility: CLINIC | Age: 47
End: 2022-08-18
Payer: COMMERCIAL

## 2022-08-18 VITALS
WEIGHT: 218.25 LBS | DIASTOLIC BLOOD PRESSURE: 91 MMHG | SYSTOLIC BLOOD PRESSURE: 171 MMHG | BODY MASS INDEX: 35.08 KG/M2 | HEIGHT: 66 IN | HEART RATE: 99 BPM

## 2022-08-18 DIAGNOSIS — R29.898 RIGHT HAND WEAKNESS: ICD-10-CM

## 2022-08-18 PROCEDURE — 99203 OFFICE O/P NEW LOW 30 MIN: CPT | Mod: S$GLB,,,

## 2022-08-18 PROCEDURE — 3080F PR MOST RECENT DIASTOLIC BLOOD PRESSURE >= 90 MM HG: ICD-10-PCS | Mod: CPTII,S$GLB,,

## 2022-08-18 PROCEDURE — 1160F PR REVIEW ALL MEDS BY PRESCRIBER/CLIN PHARMACIST DOCUMENTED: ICD-10-PCS | Mod: CPTII,S$GLB,,

## 2022-08-18 PROCEDURE — 99203 PR OFFICE/OUTPT VISIT, NEW, LEVL III, 30-44 MIN: ICD-10-PCS | Mod: S$GLB,,,

## 2022-08-18 PROCEDURE — 99999 PR PBB SHADOW E&M-EST. PATIENT-LVL IV: CPT | Mod: PBBFAC,,,

## 2022-08-18 PROCEDURE — 1160F RVW MEDS BY RX/DR IN RCRD: CPT | Mod: CPTII,S$GLB,,

## 2022-08-18 PROCEDURE — 1159F MED LIST DOCD IN RCRD: CPT | Mod: CPTII,S$GLB,,

## 2022-08-18 PROCEDURE — 3077F PR MOST RECENT SYSTOLIC BLOOD PRESSURE >= 140 MM HG: ICD-10-PCS | Mod: CPTII,S$GLB,,

## 2022-08-18 PROCEDURE — 4010F PR ACE/ARB THEARPY RXD/TAKEN: ICD-10-PCS | Mod: CPTII,S$GLB,,

## 2022-08-18 PROCEDURE — 3046F PR MOST RECENT HEMOGLOBIN A1C LEVEL > 9.0%: ICD-10-PCS | Mod: CPTII,S$GLB,,

## 2022-08-18 PROCEDURE — 1159F PR MEDICATION LIST DOCUMENTED IN MEDICAL RECORD: ICD-10-PCS | Mod: CPTII,S$GLB,,

## 2022-08-18 PROCEDURE — 3046F HEMOGLOBIN A1C LEVEL >9.0%: CPT | Mod: CPTII,S$GLB,,

## 2022-08-18 PROCEDURE — 3077F SYST BP >= 140 MM HG: CPT | Mod: CPTII,S$GLB,,

## 2022-08-18 PROCEDURE — 3080F DIAST BP >= 90 MM HG: CPT | Mod: CPTII,S$GLB,,

## 2022-08-18 PROCEDURE — 99999 PR PBB SHADOW E&M-EST. PATIENT-LVL IV: ICD-10-PCS | Mod: PBBFAC,,,

## 2022-08-18 PROCEDURE — 4010F ACE/ARB THERAPY RXD/TAKEN: CPT | Mod: CPTII,S$GLB,,

## 2022-08-18 NOTE — PROGRESS NOTES
"Subjective:       Patient ID: Hany Guerin is a 46 y.o. male.    Chief Complaint:  right hand weakness and Numbness      History of Present Illness  46 year old male who presents today for weakness and numbness in the right wrist. Past medical history below. He states over this past weekend one night he fell asleep with his with both wrist with his finger interlocked behind his head. He woke up the next morning and was able to lift his right wrist. Loss of sensation from wrist down to all fingers. Moderate amount of weakness in his right hand. I I have a hard time moving it". He denies any numbness in his other extremities. No vision or speech changes.     Past Medical History:   Diagnosis Date    Diabetes mellitus     High cholesterol     Hypertension        Past Surgical History:   Procedure Laterality Date    CHOLECYSTECTOMY         Family History   Problem Relation Age of Onset    Hypertension Mother     Diabetes Mother     Hypertension Father     Hypertension Maternal Grandfather     Diabetes Maternal Grandfather     Miscarriages / Stillbirths Paternal Grandmother     Diabetes Paternal Grandfather     Hypertension Paternal Grandfather        Social History     Socioeconomic History    Marital status: Significant Other     Spouse name: pedro    Number of children: 2   Occupational History    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[employer]   Tobacco Use    Smoking status: Every Day     Packs/day: 1.00     Types: Cigarettes     Start date: 7/12/1994    Smokeless tobacco: Never   Substance and Sexual Activity    Alcohol use: Yes     Alcohol/week: 3.0 - 4.0 standard drinks     Types: 3 - 4 Cans of beer per week     Comment: Daily    Drug use: Yes     Frequency: 5.0 times per week     Types: Marijuana    Sexual activity: Yes     Partners: Female   Social History Narrative    Lives with mother in law and girlfriend       Current Outpatient Medications   Medication Sig Dispense Refill    amlodipine-olmesartan (KENZIE) 10-20 mg per " tablet Take 1 tablet by mouth once daily. 90 tablet 1    blood-glucose meter,continuous (DEXCOM G6 ) Misc Use as needed to monitor blood glucose 1 each 0    blood-glucose transmitter (DEXCOM G6 TRANSMITTER) Alicja Use as needed to monitor blood glucose 1 each 2    doxycycline (VIBRAMYCIN) 100 MG Cap Take 1 capsule (100 mg total) by mouth 2 (two) times daily. 20 capsule 0    gabapentin (NEURONTIN) 100 MG capsule Take 1 capsule (100 mg total) by mouth 3 (three) times daily. 90 capsule 11    hydroCHLOROthiazide (MICROZIDE) 12.5 mg capsule Take 1 capsule (12.5 mg total) by mouth every morning. 90 capsule 1    metronidazole 1% (METROGEL) 1 % Gel Apply topically every other day.      semaglutide (OZEMPIC) 1 mg/dose (4 mg/3 mL) Inject 1 mg into the skin every 7 days. 1 pen 2    blood-glucose sensor (DEXCOM G6 SENSOR) Alicja Use as needed to monitor blood glucose 3 each 12    flash glucose sensor (FREESTYLE COTY 2 SENSOR) Kit 1 Device by Misc.(Non-Drug; Combo Route) route every 14 (fourteen) days. 1 kit 5    FREESTYLE COTY 14 DAY READER Misc Use with sensor as directed 1 each 0    levoFLOXacin (LEVAQUIN) 750 MG tablet Take 1 tablet (750 mg total) by mouth once daily. 42 tablet 0     No current facility-administered medications for this visit.       Review of patient's allergies indicates:   Allergen Reactions    Penicillins         Review of Systems  Review of Systems   Constitutional: Negative.    HENT: Negative.     Eyes: Negative.    Respiratory: Negative.     Cardiovascular: Negative.    Gastrointestinal: Negative.    Endocrine: Negative.    Genitourinary: Negative.    Musculoskeletal: Negative.    Skin: Negative.    Neurological:  Positive for weakness and numbness.   Psychiatric/Behavioral: Negative.       Objective:      Neurologic Exam     Mental Status   Oriented to person, place, and time.   Registration: recalls 3 of 3 objects.   Attention: normal. Concentration: normal.   Speech: speech is normal   Level  of consciousness: alert  Knowledge: good.   Normal comprehension.     Cranial Nerves   Cranial nerves II through XII intact.     Motor Exam   Muscle bulk: normal  Overall muscle tone: normal  Right arm tone: decreased  Left arm tone: normal  Right arm pronator drift: absent  Left arm pronator drift: absent  Right leg tone: normal  Left leg tone: normal    Strength   Right neck flexion: 5/5  Left neck flexion: 5/5  Right neck extension: 5/5  Left neck extension: 5/5  Right deltoid: 5/5  Left deltoid: 5/5  Right biceps: 5/5  Left biceps: 5/5  Right triceps: 5/5  Left triceps: 5/5  Right wrist flexion: 4/5  Left wrist flexion: 5/5  Right wrist extension: 4/5  Left wrist extension: 5/5  Right interossei: 5/5  Left interossei: 5/5  Right abdominals: 5/5  Left abdominals: 5/5  Right iliopsoas: 5/5  Left iliopsoas: 5/5  Right quadriceps: 5/5  Left quadriceps: 5/5  Right hamstrin/5  Left hamstrin/5  Right glutei: 5/5  Left glutei: 5/5  Right anterior tibial: 5/5  Left anterior tibial: 5/5  Right posterior tibial: 5/5  Left posterior tibial: 5/5  Right peroneal: 5/5  Left peroneal: 5/5  Right gastroc: 5/5  Left gastroc: 5/5    Sensory Exam   Right arm light touch: decreased from wrist  Left arm light touch: normal  Right leg light touch: normal  Left leg light touch: normal  Right arm vibration: decreased from wrist  Left arm vibration: normal  Right leg vibration: normal  Left leg vibration: normal  Proprioception normal.   Right arm pinprick: decreased from wrist  Left arm pinprick: normal  Right leg pinprick: normal  Left leg pinprick: normal    Gait, Coordination, and Reflexes     Gait  Gait: normal    Coordination   Romberg: negative    Tremor   Resting tremor: absent  Intention tremor: absent  Action tremor: absent    Reflexes   Right brachioradialis: 2+  Left brachioradialis: 2+  Right biceps: 2+  Left biceps: 2+  Right triceps: 2+  Left triceps: 2+  Right patellar: 2+  Left patellar: 2+  Right achilles:  2+  Left achilles: 2+  Right : 1+  Left : 2+    Physical Exam  HENT:      Head: Normocephalic and atraumatic.   Cardiovascular:      Rate and Rhythm: Normal rate.   Pulmonary:      Effort: Pulmonary effort is normal.   Skin:     General: Skin is warm and dry.   Neurological:      Mental Status: He is alert and oriented to person, place, and time.      Cranial Nerves: Cranial nerves 2-12 are intact.      Sensory: Sensory deficit present.      Motor: Weakness present.      Coordination: Coordination is intact. Romberg Test normal.      Gait: Gait is intact.      Deep Tendon Reflexes:      Reflex Scores:       Tricep reflexes are 2+ on the right side and 2+ on the left side.       Bicep reflexes are 2+ on the right side and 2+ on the left side.       Brachioradialis reflexes are 2+ on the right side and 2+ on the left side.       Patellar reflexes are 2+ on the right side and 2+ on the left side.       Achilles reflexes are 2+ on the right side and 2+ on the left side.  Psychiatric:         Mood and Affect: Mood normal.         Speech: Speech normal.         Behavior: Behavior is cooperative.         Assessment and Plan:       1. Right hand weakness  - EMG W/ ULTRASOUND AND NERVE CONDUCTION TEST 1 Extremity; Future

## 2022-08-23 ENCOUNTER — HOSPITAL ENCOUNTER (OUTPATIENT)
Dept: WOUND CARE | Facility: HOSPITAL | Age: 47
Discharge: HOME | End: 2022-08-23
Attending: FAMILY MEDICINE
Payer: COMMERCIAL

## 2022-08-23 VITALS
DIASTOLIC BLOOD PRESSURE: 70 MMHG | RESPIRATION RATE: 20 BRPM | HEART RATE: 107 BPM | TEMPERATURE: 98 F | SYSTOLIC BLOOD PRESSURE: 121 MMHG

## 2022-08-23 DIAGNOSIS — E11.621 TYPE 2 DIABETES MELLITUS WITH FOOT ULCER, WITH LONG-TERM CURRENT USE OF INSULIN: Primary | ICD-10-CM

## 2022-08-23 DIAGNOSIS — L97.509 TYPE 2 DIABETES MELLITUS WITH FOOT ULCER, WITH LONG-TERM CURRENT USE OF INSULIN: Primary | ICD-10-CM

## 2022-08-23 DIAGNOSIS — Z79.4 TYPE 2 DIABETES MELLITUS WITH FOOT ULCER, WITH LONG-TERM CURRENT USE OF INSULIN: Primary | ICD-10-CM

## 2022-08-23 PROCEDURE — 99213 OFFICE O/P EST LOW 20 MIN: CPT | Performed by: FAMILY MEDICINE

## 2022-08-23 PROCEDURE — 99214 OFFICE O/P EST MOD 30 MIN: CPT | Mod: ,,, | Performed by: FAMILY MEDICINE

## 2022-08-23 PROCEDURE — 99214 PR OFFICE/OUTPT VISIT, EST, LEVL IV, 30-39 MIN: ICD-10-PCS | Mod: ,,, | Performed by: FAMILY MEDICINE

## 2022-08-23 RX ORDER — BLOOD-GLUCOSE SENSOR
1 EACH MISCELLANEOUS
Qty: 1 KIT | Refills: 5 | Status: SHIPPED | OUTPATIENT
Start: 2022-08-23 | End: 2022-08-24

## 2022-08-23 RX ORDER — FLASH GLUCOSE SCANNING READER
EACH MISCELLANEOUS
Qty: 1 EACH | Refills: 0 | Status: SHIPPED | OUTPATIENT
Start: 2022-08-23

## 2022-08-23 NOTE — PROGRESS NOTES
[facility]                Progress Note    Subjective:       Patient ID: Hany Guerin is a 46 y.o. male.    Chief Complaint: Wound Check    F/u wound care visit. Patient ambulatory to exam room with no difficulty noted. Patient denes pain or discomfort at present. Patient missed last f/u appt so changed wound dressing per self and applied gauze and black coban to left foot. Removed dressing and noted to be saturated with sweat. Patient also states he stepped in rain water. Wound to left lateral foot is tunneling at the 9 o'clock area to wound on lateral 5th toe but has not connected. Periwound macerated due to wetness of drainage. Wound care done per order. RTC in one week.    MD note:  Patient following up today for chronic DFU to left foot with osteo.  Was scheduled to see ID several weeks ago, but did not go due to the garage being closed, no parking available and it was raining.  Patient has not rescheduled.  He denies any pain int he wound.  He states that he did get the bandage wet and had to change it.  There has been no fevers, chills, or sweats.        Review of Systems   Constitutional: Negative.    HENT: Negative.    Eyes: Negative.    Respiratory: Negative.    Cardiovascular: Negative.    Gastrointestinal: Negative.    Skin: Positive for wound.   Neurological: Positive for weakness.         Objective:        Physical Exam  Constitutional:       Appearance: Normal appearance.   HENT:      Head: Normocephalic and atraumatic.      Mouth/Throat:      Mouth: Mucous membranes are moist.      Pharynx: Oropharynx is clear.   Eyes:      Extraocular Movements: Extraocular movements intact.      Conjunctiva/sclera: Conjunctivae normal.      Pupils: Pupils are equal, round, and reactive to light.   Pulmonary:      Effort: Pulmonary effort is normal.   Abdominal:      General: There is no distension.      Palpations: Abdomen is soft.   Musculoskeletal:       Right lower leg: No edema.      Left lower leg: No edema.   Skin:     General: Skin is warm and dry.      Comments: +DFU to lateral left foot with mild maceration   Neurological:      Mental Status: He is alert and oriented to person, place, and time. Mental status is at baseline.      Sensory: Sensory deficit present.   Psychiatric:         Mood and Affect: Mood normal.         Behavior: Behavior normal.         Vitals:    08/23/22 1511   BP: 121/70   Pulse: 107   Resp: 20   Temp: 97.5 °F (36.4 °C)       Assessment:           ICD-10-CM ICD-9-CM   1. Type 2 diabetes mellitus with foot ulcer, with long-term current use of insulin  E11.621 250.80    L97.509 707.15    Z79.4 V58.67            Wound 07/12/22 0851 Diabetic Ulcer plantar;lateral Foot (Active)   07/12/22 0851    Pre-existing: Yes   Primary Wound Type: Diabetic ulc   Side:    Orientation: plantar;lateral   Location: Foot   Wound Number:    Ankle-Brachial Index:    Pulses:    Removal Indication and Assessment:    Wound Outcome:    (Retired) Wound Type:    (Retired) Wound Length (cm):    (Retired) Wound Width (cm):    (Retired) Depth (cm):    Wound Description (Comments):    Removal Indications:    Wound Image   08/23/22 1500   Wound WDL ex 08/23/22 1500   Dressing Appearance Intact;Moist drainage 08/23/22 1500   Drainage Amount Small 08/23/22 1500   Drainage Characteristics/Odor Serosanguineous 08/23/22 1500   Appearance Red 08/23/22 1500   Tissue loss description Partial thickness 08/23/22 1500   Black (%), Wound Tissue Color 0 % 08/23/22 1500   Red (%), Wound Tissue Color 100 % 08/23/22 1500   Yellow (%), Wound Tissue Color 0 % 08/23/22 1500   Periwound Area Macerated 08/23/22 1500   Wound Edges Defined 08/23/22 1500   Wound Length (cm) 0.5 cm 08/23/22 1500   Wound Width (cm) 0.3 cm 08/23/22 1500   Wound Depth (cm) 0.4 cm 08/23/22 1500   Wound Volume (cm^3) 0.06 cm^3 08/23/22 1500   Wound Surface Area (cm^2) 0.15 cm^2 08/23/22 1500   Tunneling (depth  (cm)/location) 1.3cm @ 9 o'clock 08/23/22 1500   Undermining (depth (cm)/location) 0.2cm from 1-11 o'clock 08/23/22 1500   Care Cleansed with:;Antimicrobial agent;Soap and water;Sterile normal saline 08/23/22 1500   Dressing Changed 08/23/22 1500   Periwound Care Moisture barrier applied 08/23/22 1500   Off Loading Football dressing;Off loading shoe 08/23/22 1500   Dressing Change Due 08/30/22 08/23/22 1500            Wound 07/12/22 0852 Diabetic Ulcer Left lateral Toe, fifth (Active)   07/12/22 0852    Pre-existing: Yes   Primary Wound Type: Diabetic ulc   Side: Left   Orientation: lateral   Location: Toe, fifth   Wound Number:    Ankle-Brachial Index:    Pulses:    Removal Indication and Assessment:    Wound Outcome:    (Retired) Wound Type:    (Retired) Wound Length (cm):    (Retired) Wound Width (cm):    (Retired) Depth (cm):    Wound Description (Comments):    Removal Indications:    Wound Image   08/23/22 1500   Wound WDL ex 08/23/22 1500   Dressing Appearance Intact;Moist drainage 08/23/22 1500   Drainage Amount Small 08/23/22 1500   Drainage Characteristics/Odor Serosanguineous 08/23/22 1500   Appearance Red 08/23/22 1500   Tissue loss description Partial thickness 08/23/22 1500   Black (%), Wound Tissue Color 0 % 08/23/22 1500   Red (%), Wound Tissue Color 100 % 08/23/22 1500   Yellow (%), Wound Tissue Color 0 % 08/23/22 1500   Periwound Area Macerated 08/23/22 1500   Wound Edges Defined 08/23/22 1500   Wound Length (cm) 0.2 cm 08/23/22 1500   Wound Width (cm) 0.8 cm 08/23/22 1500   Wound Depth (cm) 0.8 cm 08/23/22 1500   Wound Volume (cm^3) 0.128 cm^3 08/23/22 1500   Wound Surface Area (cm^2) 0.16 cm^2 08/23/22 1500   Care Cleansed with:;Antimicrobial agent;Soap and water;Sterile normal saline 08/23/22 1500   Dressing Changed 08/23/22 1500   Periwound Care Moisture barrier applied 08/23/22 1500   Off Loading Football dressing;Off loading shoe 08/23/22 1500   Dressing Change Due 08/30/22 08/23/22 1500            Plan:                Orders Placed This Encounter   Procedures    Change dressing     Clean wound with NS. Gentian Violet to periwound.  Hydrofera Blue transfer to wound bed, Drawtex (cut to size), Mextra (short x 1). Grady Foam (long x 1, laid perpendicular). Football dressing (cast padding x3, Coban). Darco.        Follow up in about 1 week (around 8/30/2022) for wound care visit.     Lyle Thorne MD

## 2022-08-24 ENCOUNTER — PATIENT MESSAGE (OUTPATIENT)
Dept: ADMINISTRATIVE | Facility: HOSPITAL | Age: 47
End: 2022-08-24
Payer: COMMERCIAL

## 2022-08-24 ENCOUNTER — OFFICE VISIT (OUTPATIENT)
Dept: ORTHOPEDICS | Facility: CLINIC | Age: 47
End: 2022-08-24
Payer: COMMERCIAL

## 2022-08-24 ENCOUNTER — TELEPHONE (OUTPATIENT)
Dept: FAMILY MEDICINE | Facility: CLINIC | Age: 47
End: 2022-08-24
Payer: COMMERCIAL

## 2022-08-24 VITALS
OXYGEN SATURATION: 98 % | DIASTOLIC BLOOD PRESSURE: 70 MMHG | SYSTOLIC BLOOD PRESSURE: 110 MMHG | BODY MASS INDEX: 34.37 KG/M2 | RESPIRATION RATE: 18 BRPM | HEART RATE: 104 BPM | WEIGHT: 213.88 LBS | HEIGHT: 66 IN

## 2022-08-24 DIAGNOSIS — Z79.4 TYPE 2 DIABETES MELLITUS WITH FOOT ULCER, WITH LONG-TERM CURRENT USE OF INSULIN: Primary | ICD-10-CM

## 2022-08-24 DIAGNOSIS — L97.509 TYPE 2 DIABETES MELLITUS WITH FOOT ULCER, WITH LONG-TERM CURRENT USE OF INSULIN: Primary | ICD-10-CM

## 2022-08-24 DIAGNOSIS — E11.65 TYPE 2 DIABETES MELLITUS WITH HYPERGLYCEMIA, WITHOUT LONG-TERM CURRENT USE OF INSULIN: ICD-10-CM

## 2022-08-24 DIAGNOSIS — E11.621 DIABETIC ULCER OF LEFT MIDFOOT ASSOCIATED WITH TYPE 2 DIABETES MELLITUS, WITH BONE INVOLVEMENT WITHOUT EVIDENCE OF NECROSIS: ICD-10-CM

## 2022-08-24 DIAGNOSIS — M25.539 PAIN IN WRIST, UNSPECIFIED LATERALITY: ICD-10-CM

## 2022-08-24 DIAGNOSIS — E11.621 TYPE 2 DIABETES MELLITUS WITH FOOT ULCER, WITH LONG-TERM CURRENT USE OF INSULIN: Primary | ICD-10-CM

## 2022-08-24 DIAGNOSIS — L97.426 DIABETIC ULCER OF LEFT MIDFOOT ASSOCIATED WITH TYPE 2 DIABETES MELLITUS, WITH BONE INVOLVEMENT WITHOUT EVIDENCE OF NECROSIS: ICD-10-CM

## 2022-08-24 PROCEDURE — 99204 OFFICE O/P NEW MOD 45 MIN: CPT | Mod: S$GLB,,, | Performed by: ORTHOPAEDIC SURGERY

## 2022-08-24 PROCEDURE — 1159F PR MEDICATION LIST DOCUMENTED IN MEDICAL RECORD: ICD-10-PCS | Mod: CPTII,S$GLB,, | Performed by: ORTHOPAEDIC SURGERY

## 2022-08-24 PROCEDURE — 4010F PR ACE/ARB THEARPY RXD/TAKEN: ICD-10-PCS | Mod: CPTII,S$GLB,, | Performed by: ORTHOPAEDIC SURGERY

## 2022-08-24 PROCEDURE — 3046F PR MOST RECENT HEMOGLOBIN A1C LEVEL > 9.0%: ICD-10-PCS | Mod: CPTII,S$GLB,, | Performed by: ORTHOPAEDIC SURGERY

## 2022-08-24 PROCEDURE — 99999 PR PBB SHADOW E&M-EST. PATIENT-LVL V: CPT | Mod: PBBFAC,,, | Performed by: ORTHOPAEDIC SURGERY

## 2022-08-24 PROCEDURE — 1160F PR REVIEW ALL MEDS BY PRESCRIBER/CLIN PHARMACIST DOCUMENTED: ICD-10-PCS | Mod: CPTII,S$GLB,, | Performed by: ORTHOPAEDIC SURGERY

## 2022-08-24 PROCEDURE — 3008F PR BODY MASS INDEX (BMI) DOCUMENTED: ICD-10-PCS | Mod: CPTII,S$GLB,, | Performed by: ORTHOPAEDIC SURGERY

## 2022-08-24 PROCEDURE — 99204 PR OFFICE/OUTPT VISIT, NEW, LEVL IV, 45-59 MIN: ICD-10-PCS | Mod: S$GLB,,, | Performed by: ORTHOPAEDIC SURGERY

## 2022-08-24 PROCEDURE — 3046F HEMOGLOBIN A1C LEVEL >9.0%: CPT | Mod: CPTII,S$GLB,, | Performed by: ORTHOPAEDIC SURGERY

## 2022-08-24 PROCEDURE — 3078F DIAST BP <80 MM HG: CPT | Mod: CPTII,S$GLB,, | Performed by: ORTHOPAEDIC SURGERY

## 2022-08-24 PROCEDURE — 3074F PR MOST RECENT SYSTOLIC BLOOD PRESSURE < 130 MM HG: ICD-10-PCS | Mod: CPTII,S$GLB,, | Performed by: ORTHOPAEDIC SURGERY

## 2022-08-24 PROCEDURE — 3078F PR MOST RECENT DIASTOLIC BLOOD PRESSURE < 80 MM HG: ICD-10-PCS | Mod: CPTII,S$GLB,, | Performed by: ORTHOPAEDIC SURGERY

## 2022-08-24 PROCEDURE — 99999 PR PBB SHADOW E&M-EST. PATIENT-LVL V: ICD-10-PCS | Mod: PBBFAC,,, | Performed by: ORTHOPAEDIC SURGERY

## 2022-08-24 PROCEDURE — 3008F BODY MASS INDEX DOCD: CPT | Mod: CPTII,S$GLB,, | Performed by: ORTHOPAEDIC SURGERY

## 2022-08-24 PROCEDURE — 1160F RVW MEDS BY RX/DR IN RCRD: CPT | Mod: CPTII,S$GLB,, | Performed by: ORTHOPAEDIC SURGERY

## 2022-08-24 PROCEDURE — 3074F SYST BP LT 130 MM HG: CPT | Mod: CPTII,S$GLB,, | Performed by: ORTHOPAEDIC SURGERY

## 2022-08-24 PROCEDURE — 4010F ACE/ARB THERAPY RXD/TAKEN: CPT | Mod: CPTII,S$GLB,, | Performed by: ORTHOPAEDIC SURGERY

## 2022-08-24 PROCEDURE — 1159F MED LIST DOCD IN RCRD: CPT | Mod: CPTII,S$GLB,, | Performed by: ORTHOPAEDIC SURGERY

## 2022-08-24 RX ORDER — FLASH GLUCOSE SENSOR
1 KIT MISCELLANEOUS
Qty: 1 KIT | Refills: 5 | Status: SHIPPED | OUTPATIENT
Start: 2022-08-24

## 2022-08-24 NOTE — PROGRESS NOTES
Assessment: 46 y.o. male with R posture- induced radial nerve palsy    I explained my diagnostic impression and the reasoning behind it in detail, using layman's terms.  Discussed timeline of nerve recovery - several months depending on degree of nerve injury. Prognosis also difficult to specify at this point but the fact that he has had near full resolution of numbness and has preserved sensation is encouraging     Plan:   - continue wrist brace  - Emphasized importance of stretching wrist and hand to prevent contractures  - OT referral   - Keep EMG appt  - Does not need to continue gabapentin for the hand, can continue for DM neuropathy. He is finding is does help with these symptoms  - Return to clinic in 12 weeks. Return sooner if symptoms worsen or fail to improve.    All questions were answered in detail. The patient is in full agreement with the treatment plan and will proceed accordingly.    Chief Complaint   Patient presents with    Right Wrist - Pain, Numbness       Initial visit (8/24/22): Hany Guerin is a 46 y.o. male who presents today complaining of Pain and Numbness of the Right Wrist     Fell asleep in a chair with his hands behind his head on 8/15/22  Dose endorse more alcohol use than normal earlier that day while he was fishing  Estimates he was asleep for 5 hours, woke up w numbness and wrist drop  Numbness has improved   No change in weakness   Wearing cock up wrist splint - does help with function of the hand, gripping    RHD       This patient was seen in consultation at the request of Brittani Kuhn    This is the extent of the patient's complaints at this time.      Review of patient's allergies indicates:   Allergen Reactions    Penicillins          Current Outpatient Medications:     amlodipine-olmesartan (KENZIE) 10-20 mg per tablet, Take 1 tablet by mouth once daily., Disp: 90 tablet, Rfl: 1    blood-glucose meter,continuous (DEXCOM G6 ) Misc, Use as  "needed to monitor blood glucose, Disp: 1 each, Rfl: 0    blood-glucose sensor (DEXCOM G6 SENSOR) Alicja, Use as needed to monitor blood glucose, Disp: 3 each, Rfl: 12    blood-glucose transmitter (DEXCOM G6 TRANSMITTER) Alicja, Use as needed to monitor blood glucose, Disp: 1 each, Rfl: 2    doxycycline (VIBRAMYCIN) 100 MG Cap, Take 1 capsule (100 mg total) by mouth 2 (two) times daily., Disp: 20 capsule, Rfl: 0    flash glucose sensor (FREESTYLE COTY 3 SENSOR) Kit, 1 Device by Misc.(Non-Drug; Combo Route) route every 14 (fourteen) days., Disp: 1 kit, Rfl: 5    FREESTYLE COTY 14 DAY READER Misc, Use with sensor as directed, Disp: 1 each, Rfl: 0    gabapentin (NEURONTIN) 100 MG capsule, Take 1 capsule (100 mg total) by mouth 3 (three) times daily., Disp: 90 capsule, Rfl: 11    hydroCHLOROthiazide (MICROZIDE) 12.5 mg capsule, Take 1 capsule (12.5 mg total) by mouth every morning., Disp: 90 capsule, Rfl: 1    metronidazole 1% (METROGEL) 1 % Gel, Apply topically every other day., Disp: , Rfl:     semaglutide (OZEMPIC) 1 mg/dose (4 mg/3 mL), Inject 1 mg into the skin every 7 days., Disp: 1 pen, Rfl: 2    Physical Exam:   Vitals:    08/24/22 0908   BP: 110/70   Pulse: 104   Resp: 18   SpO2: 98%   Weight: 97 kg (213 lb 13.5 oz)   Height: 5' 6" (1.676 m)   PainSc: 0-No pain   PainLoc: Wrist       General:   Body mass index is 34.52 kg/m².  Patient is alert, awake and oriented to time, place and person. Mood and affect are appropriate.  Patient does not appear to be in any distress, denies any constitutional symptoms and appears stated age.   HEENT:  Pupils are equal and round, sclera are not injected. External examination of ears and nose reveals no abnormalities. Cranial nerves II-X are grossly intact  Neck: examination demonstrates painless  active range of motion. Spurling's sign is negative  Skin:  no rashes, abrasions or open wounds on the affected extremity   Resp:  No respiratory distress or audible wheezing "   CV: 2+  pulses, all extremities warm and well perfused   Right Upper extremity    Weakness of wrist and digit extensors - 0/5   Otherwise 5/5 motor to C5-T1  Localizes numbness to radial nerve dist but endorses full sensation in m/u/r today on exam   2+ RP       Imaging: 3 views of the right hand negative for fractures, degenerative changes      I personally reviewed and interpreted the patient's imaging obtained today in clinic       A note notifying Brittani Kuhn of my findings was sent via the electronic medical record     This note was created by combination of typed  and M-Modal dictation. Transcription and phonetic errors may be present.  If there are any questions, please contact me.    Past Medical History:   Diagnosis Date    Diabetes mellitus     High cholesterol     Hypertension        Active Problem List with Overview Notes    Diagnosis Date Noted    Subacute osteomyelitis of left foot     Tobacco dependence due to cigarettes 07/20/2022    Type 2 diabetes mellitus with hyperglycemia, without long-term current use of insulin 07/20/2022    Benign essential HTN 07/20/2022    Diabetic ulcer of left foot associated with type 2 diabetes mellitus, with bone involvement without evidence of necrosis 07/13/2022    Type 2 diabetes mellitus with foot ulcer, with long-term current use of insulin 07/12/2022    Hypertension 07/06/2022    COVID-19 08/18/2021    Amputated great toe, right 08/12/2019    Diabetes mellitus type 2 in nonobese 08/09/2019    Tobacco abuse 08/09/2019    Pancreatitis 05/15/2015       Past Surgical History:   Procedure Laterality Date    CHOLECYSTECTOMY         Family History   Problem Relation Age of Onset    Hypertension Mother     Diabetes Mother     Hypertension Father     Hypertension Maternal Grandfather     Diabetes Maternal Grandfather     Miscarriages / Stillbirths Paternal Grandmother     Diabetes Paternal Grandfather     Hypertension Paternal  Grandfather

## 2022-08-24 NOTE — TELEPHONE ENCOUNTER
----- Message from Jasmin Logan sent at 8/24/2022  1:32 PM CDT -----  Type:  Pharmacy Calling to Clarify an RX    Name of Caller: Lita    [facility]    Prescription Name:  FREESTYLE ROSALBA 14 DAY READER Misc    What do they need to clarify? Would like to know if script can be change to Freestyle Rosalba 2 the 14 is been disconnected    Can you be contacted via [facility]?call    Best Call Back Number: [phone]            Yes-Patient/Caregiver accepts free interpretation services...

## 2022-08-25 ENCOUNTER — PATIENT MESSAGE (OUTPATIENT)
Dept: WOUND CARE | Facility: HOSPITAL | Age: 47
End: 2022-08-25
Payer: COMMERCIAL

## 2022-08-26 ENCOUNTER — TELEPHONE (OUTPATIENT)
Dept: FAMILY MEDICINE | Facility: CLINIC | Age: 47
End: 2022-08-26
Payer: COMMERCIAL

## 2022-08-26 NOTE — TELEPHONE ENCOUNTER
Return call placed to Irasema at patients pharmacy and VO given for patient to receive the hanna freestyle 14 reader to replace the 2 which is being discontinued. Understanding and thank you verbalized.

## 2022-08-26 NOTE — TELEPHONE ENCOUNTER
----- Message from Leda Olivera sent at 8/26/2022  1:29 PM CDT -----  Regarding: request for a change on diabetic supplies  Type:  Pharmacy Calling to Clarify an RX    Name of Caller: Irasema     Ph[facility]acy Name: [facility]     Prescription Name: Freestyle Hanna 2 reader     What do they need to clarify? Irasema from [facility] called to request a medication change for the patient's diabetic reader. She is requesting the freestlye hanna 2 reader. She has all of the other supplies but is missing the reader.     Can you be contacted via [facility]?no    Best Call Back Number: [phone]

## 2022-08-30 ENCOUNTER — HOSPITAL ENCOUNTER (OUTPATIENT)
Dept: WOUND CARE | Facility: HOSPITAL | Age: 47
Discharge: HOME | End: 2022-08-30
Attending: FAMILY MEDICINE
Payer: COMMERCIAL

## 2022-08-30 VITALS
DIASTOLIC BLOOD PRESSURE: 83 MMHG | SYSTOLIC BLOOD PRESSURE: 140 MMHG | TEMPERATURE: 98 F | RESPIRATION RATE: 20 BRPM | HEART RATE: 103 BPM

## 2022-08-30 DIAGNOSIS — L97.426 DIABETIC ULCER OF LEFT MIDFOOT ASSOCIATED WITH TYPE 2 DIABETES MELLITUS, WITH BONE INVOLVEMENT WITHOUT EVIDENCE OF NECROSIS: ICD-10-CM

## 2022-08-30 DIAGNOSIS — M86.272 SUBACUTE OSTEOMYELITIS OF LEFT FOOT: ICD-10-CM

## 2022-08-30 DIAGNOSIS — L97.526 DIABETIC ULCER OF LEFT FOOT ASSOCIATED WITH TYPE 2 DIABETES MELLITUS, WITH BONE INVOLVEMENT WITHOUT EVIDENCE OF NECROSIS: Primary | ICD-10-CM

## 2022-08-30 DIAGNOSIS — E11.65 TYPE 2 DIABETES MELLITUS WITH HYPERGLYCEMIA, WITHOUT LONG-TERM CURRENT USE OF INSULIN: ICD-10-CM

## 2022-08-30 DIAGNOSIS — E11.621 DIABETIC ULCER OF LEFT FOOT ASSOCIATED WITH TYPE 2 DIABETES MELLITUS, WITH BONE INVOLVEMENT WITHOUT EVIDENCE OF NECROSIS: Primary | ICD-10-CM

## 2022-08-30 DIAGNOSIS — E11.621 DIABETIC ULCER OF LEFT MIDFOOT ASSOCIATED WITH TYPE 2 DIABETES MELLITUS, WITH BONE INVOLVEMENT WITHOUT EVIDENCE OF NECROSIS: ICD-10-CM

## 2022-08-30 PROCEDURE — 99214 OFFICE O/P EST MOD 30 MIN: CPT | Mod: ,,, | Performed by: FAMILY MEDICINE

## 2022-08-30 PROCEDURE — 99214 PR OFFICE/OUTPT VISIT, EST, LEVL IV, 30-39 MIN: ICD-10-PCS | Mod: ,,, | Performed by: FAMILY MEDICINE

## 2022-08-30 PROCEDURE — 99213 OFFICE O/P EST LOW 20 MIN: CPT | Performed by: FAMILY MEDICINE

## 2022-08-30 RX ORDER — BLOOD-GLUCOSE SENSOR
EACH MISCELLANEOUS
Qty: 3 EACH | Refills: 12 | Status: SHIPPED | OUTPATIENT
Start: 2022-08-30

## 2022-08-30 NOTE — TELEPHONE ENCOUNTER
No new care gaps identified.  [facility] Embedded Care Gaps. Reference number: [id number]. 8/30/2022   9:35:31 AM ALMAST

## 2022-08-30 NOTE — TELEPHONE ENCOUNTER
----- Message from Lida Daniels sent at 8/29/2022  8:47 AM CDT -----  Regarding: Rx concerns  Name of Who is Calling: Marysol, SO           What is the request in detail: Marysol is requesting a call back in regards to patient Rx for (DEXCOM G6 SENSOR) Device. She stated that it was sent to [facility] with a Pre authorization but now it needs to be sent to [facility] [place] with  Pre Authorization.            Can the clinic reply by [facility]: No           What [facility] Back [facility]: [phone]

## 2022-08-30 NOTE — PROGRESS NOTES
[facility]                Progress Note    Subjective:       Patient ID: Hany Guerin is a 46 y.o. male.    Chief Complaint: Wound Check    F/u wound care visit. Patient ambulatory to exam room with no c/o pain or discomfort at present. Wound dressing to left foot intact with no drainage noted. Patient stated he changed wound dressing 2 days ago due to old dressing getting wet. No drainage noted to wound. Left lateral 5th toe wound appears healed. Wound to left lateral plantar foot measuring smaller length and width. Patient instructed if wound dressing changed to limit amount of coban to foot, verbalized understanding. Wound care done per order. RTC in one week.    MD note:  patient following up today for DFU to left foot.  He has not scheduled with ID as of yet.  He continues to spend most of his day at work and at times is not wearing shoes despite working in as a .  He has not gotten his CGM as of yet, but states he will get it by next week.  He has not had any pain in the foot.  There has been no fevers, chills, or sweats.  There has been no odor from wound.    Review of Systems   Constitutional: Negative.    HENT: Negative.     Eyes: Negative.    Respiratory: Negative.     Cardiovascular: Negative.    Skin:  Positive for wound.   Psychiatric/Behavioral: Negative.         Objective:        Physical Exam  Constitutional:       Appearance: Normal appearance.   HENT:      Head: Normocephalic and atraumatic.      Right Ear: External ear normal.      Left Ear: External ear normal.      Mouth/Throat:      Mouth: Mucous membranes are moist.      Pharynx: Oropharynx is clear.   Eyes:      Extraocular Movements: Extraocular movements intact.      Conjunctiva/sclera: Conjunctivae normal.      Pupils: Pupils are equal, round, and reactive to light.   Cardiovascular:      Rate and Rhythm: Normal rate and regular rhythm.      Pulses: Normal pulses.   Pulmonary:       Effort: Pulmonary effort is normal. No respiratory distress.   Abdominal:      General: Bowel sounds are normal.      Palpations: Abdomen is soft.   Musculoskeletal:      Right lower leg: No edema.      Left lower leg: No edema.   Skin:     General: Skin is warm and dry.      Comments: +DFU to lateral left foot without maceration or excess slough   Neurological:      Mental Status: He is alert.       Vitals:    08/30/22 1518   BP: (!) 140/83   Pulse: 103   Resp: 20   Temp: 98.1 °F (36.7 °C)       Assessment:           ICD-10-CM ICD-9-CM   1. Diabetic ulcer of left foot associated with type 2 diabetes mellitus, with bone involvement without evidence of necrosis  E11.621 250.80    L97.526 707.15   2. Subacute osteomyelitis of left foot  M86.272 730.07            (Retired) Wound 07/12/22 0851 Diabetic Ulcer Left plantar;lateral Foot (Active)   07/12/22 0851    Pre-existing: Yes   Primary Wound Type: Diabetic ulc   Side: Left   Orientation: plantar;lateral   Location: Foot   Wound Number:    Ankle-Brachial Index:    Pulses:    Removal Indication and Assessment:    Wound Outcome:    (Retired) Wound Type:    (Retired) Wound Length (cm):    (Retired) Wound Width (cm):    (Retired) Depth (cm):    Wound Description (Comments):    Removal Indications:    Wound Image   08/30/22 1500   Wound WDL ex 08/30/22 1500   Dressing Appearance Intact;Dry 08/30/22 1500   Drainage Amount None 08/30/22 1500   Appearance Red 08/30/22 1500   Tissue loss description Partial thickness 08/30/22 1500   Black (%), Wound Tissue Color 0 % 08/30/22 1500   Red (%), Wound Tissue Color 100 % 08/30/22 1500   Yellow (%), Wound Tissue Color 0 % 08/30/22 1500   Periwound Area Macerated 08/30/22 1500   Wound Edges Defined 08/30/22 1500   Wound Length (cm) 0.3 cm 08/30/22 1500   Wound Width (cm) 0.3 cm 08/30/22 1500   Wound Depth (cm) 0.4 cm 08/30/22 1500   Wound Volume (cm^3) 0.036 cm^3 08/30/22 1500   Wound Surface Area (cm^2) 0.09 cm^2 08/30/22 1500    Care Cleansed with:;Soap and water;Sterile normal saline 08/30/22 1500   Dressing Changed 08/30/22 1500   Packing packed with;other (see comment) 08/30/22 1500   Periwound Care Moisture barrier applied 08/30/22 1500   Off Loading Football dressing;Off loading shoe 08/30/22 1500   Dressing Change Due 09/06/22 08/30/22 1500       [REMOVED]      (Retired) Wound 07/12/22 0852 Diabetic Ulcer Left lateral Toe, fifth (Removed)   07/12/22 0852    Pre-existing: Yes   Primary Wound Type: Diabetic ulc   Side: Left   Orientation: lateral   Location: Toe, fifth   Wound Number:    Ankle-Brachial Index:    Pulses:    Removal Indication and Assessment:    Wound Outcome: Healed   (Retired) Wound Type:    (Retired) Wound Length (cm):    (Retired) Wound Width (cm):    (Retired) Depth (cm):    Wound Description (Comments):    Removal Indications:    Removed 08/30/22 1603   Wound Image   08/30/22 1500   Wound WDL WDL 08/30/22 1500   Dressing Appearance Intact;Dry 08/30/22 1500   Drainage Amount None 08/30/22 1500   Appearance Epithelialization 08/30/22 1500   Wound Edges Rolled/closed 08/30/22 1500   Wound Length (cm) 0 cm 08/30/22 1500   Wound Width (cm) 0 cm 08/30/22 1500   Wound Depth (cm) 0 cm 08/30/22 1500   Wound Volume (cm^3) 0 cm^3 08/30/22 1500   Wound Surface Area (cm^2) 0 cm^2 08/30/22 1500   Care Cleansed with:;Sterile normal saline 08/30/22 1500           Plan:                Orders Placed This Encounter   Procedures    Change dressing     Clean with NS. Gentian violet to periwound and between toes. Endoform to wound bed, Iodosorb. Mextra short x1, tamy long x1. Football dressing (cast padding x3, coban). Francesca      Patient agrees to schedule with ID before his next wound care appointment  He will get CGM this week  He is to take all medications as prescribe  He is to wear shoes when at work at all times  He was told the longer the bone infection goes untreated the higher the risk of amputation and possible systemic  infection  Call with any concerns    Follow up in about 1 week (around 9/6/2022) for wound care visit.

## 2022-09-05 LAB — FUNGUS SPEC CULT: NORMAL

## 2022-09-06 ENCOUNTER — TELEPHONE (OUTPATIENT)
Dept: FAMILY MEDICINE | Facility: CLINIC | Age: 47
End: 2022-09-06
Payer: COMMERCIAL

## 2022-09-06 ENCOUNTER — HOSPITAL ENCOUNTER (OUTPATIENT)
Dept: WOUND CARE | Facility: HOSPITAL | Age: 47
Discharge: HOME | End: 2022-09-06
Attending: FAMILY MEDICINE
Payer: COMMERCIAL

## 2022-09-06 VITALS — SYSTOLIC BLOOD PRESSURE: 147 MMHG | HEART RATE: 97 BPM | DIASTOLIC BLOOD PRESSURE: 93 MMHG | TEMPERATURE: 98 F

## 2022-09-06 DIAGNOSIS — E11.621 DIABETIC ULCER OF LEFT FOOT ASSOCIATED WITH TYPE 2 DIABETES MELLITUS, WITH BONE INVOLVEMENT WITHOUT EVIDENCE OF NECROSIS: Primary | ICD-10-CM

## 2022-09-06 DIAGNOSIS — L97.526 DIABETIC ULCER OF LEFT FOOT ASSOCIATED WITH TYPE 2 DIABETES MELLITUS, WITH BONE INVOLVEMENT WITHOUT EVIDENCE OF NECROSIS: Primary | ICD-10-CM

## 2022-09-06 DIAGNOSIS — M86.272 SUBACUTE OSTEOMYELITIS OF LEFT FOOT: ICD-10-CM

## 2022-09-06 PROBLEM — M86.672 CHRONIC OSTEOMYELITIS OF LEFT FOOT: Status: ACTIVE | Noted: 2022-09-06

## 2022-09-06 PROCEDURE — 99214 PR OFFICE/OUTPT VISIT, EST, LEVL IV, 30-39 MIN: ICD-10-PCS | Mod: ,,, | Performed by: FAMILY MEDICINE

## 2022-09-06 PROCEDURE — 99213 OFFICE O/P EST LOW 20 MIN: CPT | Performed by: FAMILY MEDICINE

## 2022-09-06 PROCEDURE — 99214 OFFICE O/P EST MOD 30 MIN: CPT | Mod: ,,, | Performed by: FAMILY MEDICINE

## 2022-09-06 RX ORDER — SULFAMETHOXAZOLE AND TRIMETHOPRIM 800; 160 MG/1; MG/1
1 TABLET ORAL 2 TIMES DAILY
Qty: 20 TABLET | Refills: 0 | Status: SHIPPED | OUTPATIENT
Start: 2022-09-06 | End: 2022-09-16

## 2022-09-06 NOTE — TELEPHONE ENCOUNTER
Pt had appt on 10/20/22 @ 11:40 am This appt has to be lisa due to the 11:40 slot is not for pt to make appt per  appt has been 10/21/22 @ 1:00 pm  will mail out new appt letter also  his voicemail is full as of 19/06/22 @ 11:17 am vs

## 2022-09-06 NOTE — PROGRESS NOTES
"[facility]                Progress Note    Subjective:       Patient ID: Hany Guerin is a 46 y.o. male.    Chief Complaint: Wound Check    Follow up wound care visit. Patient ambulated to room unaided, with prescribed Darco shoe on Left Foot. C/o pain rating as 2/10 at present. Dressing in place with a moderate amount of  serosanguineous, non-malodor drainage. Left Lateral/Plantar Foot and Left 5th Lateral Toe wounds measuring larger. Patient reports feeling "something rubbing" internally on the Left Lateral side of his foot away from wound bed. He reports changing dressing twice this week due to "the rubbing and pain" he felt on Thursday and Saturday. He declines to go to the ER for a workup today, but verbally reports he will go tomorrow. Gave pain and infection precautions. MD started on oral antibiotics today.     Wound care done as per order. RTC in 1 week.     MD note:  patient following up for chronic osteo of left foot and and DFU of left foot.  He has yet to rescheduled with infectious disease and is not monitoring blood sugars regularly.  He continues to be on his feet most of the day as he does work as a .  He states he is taking all medications as prescribed.  No fevers, chills, or sweat.  No noticeable odor from wound per patient.      Review of Systems   Constitutional: Negative.    HENT: Negative.     Eyes: Negative.    Respiratory: Negative.     Cardiovascular: Negative.    Gastrointestinal: Negative.    Musculoskeletal: Negative.    Skin:  Positive for wound.   Psychiatric/Behavioral: Negative.         Objective:        Physical Exam  Constitutional:       Appearance: Normal appearance.   HENT:      Head: Normocephalic and atraumatic.      Right Ear: External ear normal.      Left Ear: External ear normal.      Mouth/Throat:      Mouth: Mucous membranes are moist.      Pharynx: Oropharynx is clear.   Eyes:      Extraocular Movements: " MELLO spoke with RN who reports pt is ready for dc today. MELLO called Lio Garcia at [facility] who is able to accept him back. Pt is from long term care at [facility]. Transport arranged for 5pm today thru [facility]. MELLO called brother Penelope Waddell who wants to know what pt is doing much better than yesterday as he is concerned about dc today. He will follow up with RN and let me know if pt is not ok to leave at 5pm today.     Pt is returning to [id number] [facility]  Report: [phone]  Or Main [phone]  Electronically signed by [id number] JC Reyes on 9/28/2019 at 12:09 PM  MELLO spoke with RN who reports after conversation with brother, he is agreeable to [employer] today  Electronically signed by [id number] JC Reyes on 9/28/2019 at 4:17 PM Extraocular movements intact.      Conjunctiva/sclera: Conjunctivae normal.      Pupils: Pupils are equal, round, and reactive to light.   Cardiovascular:      Rate and Rhythm: Normal rate and regular rhythm.   Pulmonary:      Effort: Pulmonary effort is normal. No respiratory distress.   Abdominal:      General: There is no distension.      Palpations: Abdomen is soft.   Skin:     General: Skin is warm and dry.      Comments: +DFU's to lateral left foot with mild maceration   Neurological:      Mental Status: He is alert.   Psychiatric:         Mood and Affect: Mood normal.         Behavior: Behavior normal.       Vitals:    09/06/22 1106   BP: (!) 147/93   Pulse: 97   Temp: 97.9 °F (36.6 °C)       Assessment:           ICD-10-CM ICD-9-CM   1. Diabetic ulcer of left foot associated with type 2 diabetes mellitus, with bone involvement without evidence of necrosis  E11.621 250.80    L97.526 707.15   2. Subacute osteomyelitis of left foot  M86.272 730.07            (Retired) Wound 07/12/22 0851 Diabetic Ulcer Left plantar;lateral Foot (Active)   07/12/22 0851    Pre-existing: Yes   Primary Wound Type: Diabetic ulc   Side: Left   Orientation: plantar;lateral   Location: Foot   Wound Number:    Ankle-Brachial Index:    Pulses:    Removal Indication and Assessment:    Wound Outcome:    (Retired) Wound Type:    (Retired) Wound Length (cm):    (Retired) Wound Width (cm):    (Retired) Depth (cm):    Wound Description (Comments):    Removal Indications:    Wound Image   09/06/22 1100   Wound WDL ex 09/06/22 1100   Dressing Appearance Intact;Moist drainage 09/06/22 1100   Drainage Amount Large 09/06/22 1100   Drainage Characteristics/Odor Serosanguineous 09/06/22 1100   Appearance Red;Pink;Yellow;Slough;Moist 09/06/22 1100   Tissue loss description Full thickness 09/06/22 1100   Black (%), Wound Tissue Color 0 % 09/06/22 1100   Red (%), Wound Tissue Color 90 % 09/06/22 1100   Yellow (%), Wound Tissue Color 10 % 09/06/22 1100    Periwound Area Macerated;Moist;Pale white;Redness;Swelling;Warm 09/06/22 1100   Wound Edges Defined;Open 09/06/22 1100   Wound Length (cm) 0.8 cm 09/06/22 1100   Wound Width (cm) 0.8 cm 09/06/22 1100   Wound Depth (cm) 0.3 cm 09/06/22 1100   Wound Volume (cm^3) 0.192 cm^3 09/06/22 1100   Wound Surface Area (cm^2) 0.64 cm^2 09/06/22 1100   Tunneling (depth (cm)/location) 1.3 cm @ 6 o'clock 09/06/22 1100   Care Cleansed with:;Antimicrobial agent;Sterile normal saline 09/06/22 1100   Dressing Changed 09/06/22 1100   Periwound Care Moisture barrier applied 09/06/22 1100   Off Loading Football dressing;Off loading shoe 09/06/22 1100   Dressing Change Due 09/13/22 09/06/22 1100            (Retired) Wound 07/12/22 0852 Diabetic Ulcer Left lateral Toe, fifth (Active)   07/12/22 0852    Pre-existing: Yes   Primary Wound Type: Diabetic ulc   Side: Left   Orientation: lateral   Location: Toe, fifth   Wound Number:    Ankle-Brachial Index:    Pulses:    Removal Indication and Assessment:    Wound Outcome: Healed   (Retired) Wound Type:    (Retired) Wound Length (cm):    (Retired) Wound Width (cm):    (Retired) Depth (cm):    Wound Description (Comments):    Removal Indications:    Wound Image   09/06/22 1100   Wound WDL ex 09/06/22 1100   Dressing Appearance Intact;Moist drainage 09/06/22 1100   Drainage Amount Large 09/06/22 1100   Drainage Characteristics/Odor Serosanguineous;No odor 09/06/22 1100   Appearance Red;Bleeding 09/06/22 1100   Tissue loss description Full thickness 09/06/22 1100   Black (%), Wound Tissue Color 0 % 09/06/22 1100   Red (%), Wound Tissue Color 100 % 09/06/22 1100   Yellow (%), Wound Tissue Color 0 % 09/06/22 1100   Periwound Area Macerated;Moist;Pale white;Redness;Swelling;Warm 09/06/22 1100   Wound Edges Defined;Open 09/06/22 1100   Wound Length (cm) 0.9 cm 09/06/22 1100   Wound Width (cm) 1.3 cm 09/06/22 1100   Wound Depth (cm) 0.6 cm 09/06/22 1100   Wound Volume (cm^3) 0.702 cm^3 09/06/22 1100  "  Wound Surface Area (cm^2) 1.17 cm^2 09/06/22 1100   Care Cleansed with:;Antimicrobial agent;Sterile normal saline 09/06/22 1100   Dressing Changed 09/06/22 1100   Periwound Care Moisture barrier applied 09/06/22 1100   Off Loading Football dressing;Off loading shoe 09/06/22 1100   Dressing Change Due 09/13/22 09/06/22 1100           Plan:                Orders Placed This Encounter   Procedures    WOUND SUPPLIES FOR HOME USE     Clean with NS. Gentian violet to periwound and between toes. Hydrofera Blue transfer packed into wound beds. Drawtex then Mextra short x1, Grady long x1. Football dressing (cast padding x3, coban). Darco    Patient to change at up to twice a week depending on drainage.     Order Specific Question:   Height:     Answer:   5' 6"     Order Specific Question:   Weight:     Answer:   213 lb     Order Specific Question:   Length of need (1-99 months):     Answer:   2    Change dressing     Clean with NS. Gentian violet to periwound and between toes. Hydrofera Blue transfer packed into wound beds. Drawtex then Mextra short x1, Grady long x1. Football dressing (cast padding x3, coban). [facility]        Follow up in about 1 week (around 9/13/2022) for wound care.     Lyle Thorne MD      "

## 2022-09-07 ENCOUNTER — TELEPHONE (OUTPATIENT)
Dept: WOUND CARE | Facility: HOSPITAL | Age: 47
End: 2022-09-07
Payer: COMMERCIAL

## 2022-09-13 ENCOUNTER — HOSPITAL ENCOUNTER (OUTPATIENT)
Dept: WOUND CARE | Facility: HOSPITAL | Age: 47
Discharge: HOME | End: 2022-09-13
Attending: FAMILY MEDICINE
Payer: COMMERCIAL

## 2022-09-13 VITALS
TEMPERATURE: 98 F | HEART RATE: 110 BPM | DIASTOLIC BLOOD PRESSURE: 88 MMHG | RESPIRATION RATE: 20 BRPM | SYSTOLIC BLOOD PRESSURE: 150 MMHG

## 2022-09-13 DIAGNOSIS — L97.526 DIABETIC ULCER OF LEFT FOOT ASSOCIATED WITH TYPE 2 DIABETES MELLITUS, WITH BONE INVOLVEMENT WITHOUT EVIDENCE OF NECROSIS: Primary | ICD-10-CM

## 2022-09-13 DIAGNOSIS — E11.621 DIABETIC ULCER OF LEFT FOOT ASSOCIATED WITH TYPE 2 DIABETES MELLITUS, WITH BONE INVOLVEMENT WITHOUT EVIDENCE OF NECROSIS: Primary | ICD-10-CM

## 2022-09-13 PROCEDURE — 99214 PR OFFICE/OUTPT VISIT, EST, LEVL IV, 30-39 MIN: ICD-10-PCS | Mod: ,,, | Performed by: FAMILY MEDICINE

## 2022-09-13 PROCEDURE — 99213 OFFICE O/P EST LOW 20 MIN: CPT | Performed by: FAMILY MEDICINE

## 2022-09-13 PROCEDURE — 99214 OFFICE O/P EST MOD 30 MIN: CPT | Mod: ,,, | Performed by: FAMILY MEDICINE

## 2022-09-13 NOTE — PROGRESS NOTES
[facility]                Progress Note    Subjective:       Patient ID: Hany Guerin is a 46 y.o. male.    Chief Complaint: Wound Check    F/u wound care visit. Patient ambulatory to exam room with darco on left foot as ordered. Patient denies pain or discomfort at present. Wound dressing to left foot intact with moderate amount of drainage noted. Wound to left lateral 5th toe measuring smaller length and width with increased depth. Wound to left lateral plantar foot measuring larger tunnel at 12 o'clock with increased length and width. Wound care done per order. RTC in one week.    MD note:  patient following up today for chronic DFU's.  He is scheduled to see ID in 2 weeks.  There has been no fevers, chills, or sweats.  No pain in wounds.  He has been keeping dressings in place, clean, and dry.      Review of Systems   Constitutional: Negative.    HENT:  Negative for congestion.    Eyes: Negative.    Respiratory: Negative.     Cardiovascular:  Positive for leg swelling.   Gastrointestinal: Negative.    Skin:  Positive for wound.   Psychiatric/Behavioral: Negative.         Objective:        Physical Exam  Constitutional:       Appearance: Normal appearance.   HENT:      Head: Normocephalic and atraumatic.      Right Ear: External ear normal.      Left Ear: External ear normal.      Mouth/Throat:      Mouth: Mucous membranes are moist.      Pharynx: Oropharynx is clear.   Eyes:      Conjunctiva/sclera: Conjunctivae normal.      Pupils: Pupils are equal, round, and reactive to light.   Cardiovascular:      Rate and Rhythm: Normal rate and regular rhythm.   Pulmonary:      Effort: Pulmonary effort is normal. No respiratory distress.   Abdominal:      General: There is no distension.      Palpations: Abdomen is soft.   Musculoskeletal:         General: Normal range of motion.   Neurological:      Mental Status: He is alert and oriented to person, place, and time.  Mental status is at baseline.       Vitals:    09/13/22 1052   BP: (!) 150/88   Pulse: 110   Resp: 20   Temp: 97.9 °F (36.6 °C)       Assessment:           ICD-10-CM ICD-9-CM   1. Diabetic ulcer of left foot associated with type 2 diabetes mellitus, with bone involvement without evidence of necrosis  E11.621 250.80    L97.526 707.15            (Retired) Wound 07/12/22 0851 Diabetic Ulcer Left plantar;lateral Foot (Active)   07/12/22 0851    Pre-existing: Yes   Primary Wound Type: Diabetic ulc   Side: Left   Orientation: plantar;lateral   Location: Foot   Wound Number:    Ankle-Brachial Index:    Pulses:    Removal Indication and Assessment:    Wound Outcome:    (Retired) Wound Type:    (Retired) Wound Length (cm):    (Retired) Wound Width (cm):    (Retired) Depth (cm):    Wound Description (Comments):    Removal Indications:    Wound Image    09/13/22 1000   Wound WDL ex 09/13/22 1000   Dressing Appearance Intact;Moist drainage 09/13/22 1000   Drainage Amount Moderate 09/13/22 1000   Drainage Characteristics/Odor Serosanguineous;No odor 09/13/22 1000   Appearance Red 09/13/22 1000   Tissue loss description Partial thickness 09/13/22 1000   Black (%), Wound Tissue Color 0 % 09/13/22 1000   Red (%), Wound Tissue Color 100 % 09/13/22 1000   Yellow (%), Wound Tissue Color 0 % 09/13/22 1000   Periwound Area Dry;Intact 09/13/22 1000   Wound Edges Defined 09/13/22 1000   Wound Length (cm) 0.8 cm 09/13/22 1000   Wound Width (cm) 0.8 cm 09/13/22 1000   Wound Depth (cm) 0.5 cm 09/13/22 1000   Wound Volume (cm^3) 0.32 cm^3 09/13/22 1000   Wound Surface Area (cm^2) 0.64 cm^2 09/13/22 1000   Tunneling (depth (cm)/location) 2.2 @ 12 o'clocl 09/13/22 1000   Care Cleansed with:;Soap and water;Sterile normal saline 09/13/22 1000   Dressing Changed 09/13/22 1000   Periwound Care Moisture barrier applied;Skin barrier film applied 09/13/22 1000   Off Loading Football dressing;Off loading shoe 09/13/22 1000   Dressing Change Due  09/20/22 09/13/22 1000            (Retired) Wound 07/12/22 0852 Diabetic Ulcer Left lateral Toe, fifth (Active)   07/12/22 0852    Pre-existing: Yes   Primary Wound Type: Diabetic ulc   Side: Left   Orientation: lateral   Location: Toe, fifth   Wound Number:    Ankle-Brachial Index:    Pulses:    Removal Indication and Assessment:    Wound Outcome: Healed   (Retired) Wound Type:    (Retired) Wound Length (cm):    (Retired) Wound Width (cm):    (Retired) Depth (cm):    Wound Description (Comments):    Removal Indications:    Wound Image   09/13/22 1000   Wound WDL ex 09/13/22 1000   Dressing Appearance Intact;Moist drainage 09/13/22 1000   Drainage Amount Moderate 09/13/22 1000   Drainage Characteristics/Odor Serosanguineous;No odor 09/13/22 1000   Appearance Red 09/13/22 1000   Tissue loss description Full thickness 09/13/22 1000   Black (%), Wound Tissue Color 0 % 09/13/22 1000   Red (%), Wound Tissue Color 100 % 09/13/22 1000   Yellow (%), Wound Tissue Color 0 % 09/13/22 1000   Periwound Area Dry;Intact 09/13/22 1000   Wound Edges Defined 09/13/22 1000   Wound Length (cm) 0.6 cm 09/13/22 1000   Wound Width (cm) 0.7 cm 09/13/22 1000   Wound Depth (cm) 0.6 cm 09/13/22 1000   Wound Volume (cm^3) 0.252 cm^3 09/13/22 1000   Wound Surface Area (cm^2) 0.42 cm^2 09/13/22 1000   Care Cleansed with:;Soap and water;Sterile normal saline 09/13/22 1000   Dressing Changed 09/13/22 1000   Periwound Care Moisture barrier applied;Skin barrier film applied 09/13/22 1000   Off Loading Football dressing;Off loading shoe 09/13/22 1000   Dressing Change Due 09/20/22 09/13/22 1000           Plan:                Orders Placed This Encounter   Procedures    Change dressing     Clean with NS. Gentian violet to periwound and between toes. Aquacel Ag packed into wound beds. Drawtex then Mextra short x1, Grady long x1. Football dressing (cast padding x3, coban). [facility]        Follow up in about 1 week (around 9/20/2022) for wound care visit.        Lyle Thorne MD

## 2022-09-20 ENCOUNTER — TELEPHONE (OUTPATIENT)
Dept: WOUND CARE | Facility: HOSPITAL | Age: 47
End: 2022-09-20
Payer: COMMERCIAL

## 2022-09-25 LAB
ACID FAST MOD KINY STN SPEC: NORMAL
MYCOBACTERIUM SPEC QL CULT: NORMAL

## 2022-09-26 ENCOUNTER — PROCEDURE VISIT (OUTPATIENT)
Dept: NEUROLOGY | Facility: CLINIC | Age: 47
End: 2022-09-26
Payer: COMMERCIAL

## 2022-09-26 VITALS — HEIGHT: 66 IN | WEIGHT: 213.88 LBS | BODY MASS INDEX: 34.37 KG/M2

## 2022-09-26 DIAGNOSIS — R29.898 RIGHT HAND WEAKNESS: Primary | ICD-10-CM

## 2022-09-26 PROCEDURE — 99203 OFFICE O/P NEW LOW 30 MIN: CPT | Mod: 25,S$GLB,, | Performed by: NEUROLOGICAL SURGERY

## 2022-09-26 PROCEDURE — 95909 PR NERVE CONDUCTION STUDY; 5-6 STUDIES: ICD-10-PCS | Mod: S$GLB,,, | Performed by: NEUROLOGICAL SURGERY

## 2022-09-26 PROCEDURE — 95886 MUSC TEST DONE W/N TEST COMP: CPT | Mod: S$GLB,,, | Performed by: NEUROLOGICAL SURGERY

## 2022-09-26 PROCEDURE — 95909 NRV CNDJ TST 5-6 STUDIES: CPT | Mod: S$GLB,,, | Performed by: NEUROLOGICAL SURGERY

## 2022-09-26 PROCEDURE — 99203 PR OFFICE/OUTPT VISIT, NEW, LEVL III, 30-44 MIN: ICD-10-PCS | Mod: 25,S$GLB,, | Performed by: NEUROLOGICAL SURGERY

## 2022-09-26 PROCEDURE — 95886 PR EMG COMPLETE, W/ NERVE CONDUCTION STUDIES, 5+ MUSCLES: ICD-10-PCS | Mod: S$GLB,,, | Performed by: NEUROLOGICAL SURGERY

## 2022-09-27 NOTE — PROCEDURES
Procedures  Chief Complaint   Patient presents with    Other Misc     EMG         Hany Guerin is a 47 y.o. male with a history of multiple medical diagnoses as listed below that presents for EMG/NCS to evaluate weakness in the wrist and hand on the right. He has been having increased difficulty with  strength and has been dropping objects from his grasp. He also finds that it has been difficuly to elevate his wrist on the right. He works as a  and has therefore been having increased difficulty with work.     PAST MEDICAL HISTORY:  Past Medical History:   Diagnosis Date    Diabetes mellitus     High cholesterol     Hypertension        PAST SURGICAL HISTORY:  Past Surgical History:   Procedure Laterality Date    CHOLECYSTECTOMY         SOCIAL HISTORY:  Social History     Socioeconomic History    Marital status: Significant Other     Spouse name: pedro    Number of children: 2   Occupational History    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anic   Tobacco Use    Smoking status: Every Day     Packs/day: 1.00     Types: Cigarettes     Start date: 7/12/1994    Smokeless tobacco: Never   Substance and Sexual Activity    Alcohol use: Yes     Alcohol/week: 3.0 - 4.0 standard drinks     Types: 3 - 4 Cans of beer per week     Comment: Daily    Drug use: Yes     Frequency: 5.0 times per week     Types: Marijuana    Sexual activity: Yes     Partners: Female   Social History Narrative    Lives with mother in law and girlfriend       FAMILY HISTORY:  Family History   Problem Relation Age of Onset    Hypertension Mother     Diabetes Mother     Hypertension Father     Hypertension Maternal Grandfather     Diabetes Maternal Grandfather     Miscarriages / Stillbirths Paternal Grandmother     Diabetes Paternal Grandfather     Hypertension Paternal Grandfather        ALLERGIES AND MEDICATIONS: updated and reviewed.  Review of patient's allergies indicates:   Allergen Reactions    Penicillins      Current Outpatient Medications    Medication Sig Dispense Refill    amlodipine-olmesartan (KENZIE) 10-20 mg per tablet Take 1 tablet by mouth once daily. 90 tablet 1    blood-glucose meter,continuous (DEXCOM G6 ) Misc Use as needed to monitor blood glucose 1 each 0    blood-glucose sensor (DEXCOM G6 SENSOR) Alicja Use as needed to monitor blood glucose 3 each 12    blood-glucose transmitter (DEXCOM G6 TRANSMITTER) Alicja Use as needed to monitor blood glucose 1 each 2    doxycycline (VIBRAMYCIN) 100 MG Cap Take 1 capsule (100 mg total) by mouth 2 (two) times daily. 20 capsule 0    flash glucose sensor (FREESTYLE COTY 2 SENSOR) Kit 1 Device by Misc.(Non-Drug; Combo Route) route every 14 (fourteen) days. 1 kit 5    FREESTYLE COTY 14 DAY READER Misc Use with sensor as directed 1 each 0    gabapentin (NEURONTIN) 100 MG capsule Take 1 capsule (100 mg total) by mouth 3 (three) times daily. 90 capsule 11    hydroCHLOROthiazide (MICROZIDE) 12.5 mg capsule Take 1 capsule (12.5 mg total) by mouth every morning. 90 capsule 1    metronidazole 1% (METROGEL) 1 % Gel Apply topically every other day.      semaglutide (OZEMPIC) 1 mg/dose (4 mg/3 mL) Inject 1 mg into the skin every 7 days. 1 pen 2     No current facility-administered medications for this visit.       Review of Systems   Constitutional:  Negative for activity change, fatigue and unexpected weight change.   HENT:  Negative for trouble swallowing and voice change.    Eyes:  Negative for photophobia, pain and visual disturbance.   Respiratory:  Negative for apnea and shortness of breath.    Cardiovascular:  Negative for chest pain and palpitations.   Gastrointestinal:  Negative for constipation, nausea and vomiting.   Genitourinary:  Negative for difficulty urinating.   Musculoskeletal:  Negative for arthralgias, back pain, gait problem, myalgias and neck pain.   Skin:  Negative for color change and rash.   Neurological:  Positive for weakness and numbness. Negative for dizziness, seizures,  syncope, speech difficulty, light-headedness and headaches.   Psychiatric/Behavioral:  Negative for agitation, behavioral problems and confusion.      Neurologic Exam     Mental Status   Oriented to person, place, and time.   Registration: recalls 3 of 3 objects.   Attention: normal. Concentration: normal.   Speech: speech is normal   Level of consciousness: alert  Knowledge: good.     Cranial Nerves     CN II   Right visual field deficit: none  Left visual field deficit: none     CN III, IV, VI   Pupils are equal, round, and reactive to light.  Extraocular motions are normal.   Right pupil: Size: 3 mm. Shape: regular.   Left pupil: Size: 3 mm. Shape: regular.   CN III: no CN III palsy  CN VI: no CN VI palsy  Nystagmus: none   Diplopia: none  Ophthalmoparesis: none  Upgaze: normal  Downgaze: normal  Conjugate gaze: present    CN VII   Facial expression full, symmetric.   Right facial weakness: none  Left facial weakness: none    CN VIII   CN VIII normal.     CN XI   CN XI normal.     CN XII   CN XII normal.   Tongue deviation: none    Motor Exam   Muscle bulk: normal  Overall muscle tone: normal  Right arm tone: normal  Left arm tone: normal  Right leg tone: normal  Left leg tone: normal    Gait, Coordination, and Reflexes     Gait  Gait: normal    Coordination   Finger to nose coordination: normal    Tremor   Resting tremor: absent    Physical Exam  Constitutional:       Appearance: He is well-developed.   HENT:      Head: Normocephalic and atraumatic.   Eyes:      Extraocular Movements: EOM normal.      Pupils: Pupils are equal, round, and reactive to light.   Pulmonary:      Effort: Pulmonary effort is normal. No respiratory distress.   Musculoskeletal:         General: Normal range of motion.   Neurological:      Mental Status: He is alert and oriented to person, place, and time.      Coordination: Finger-Nose-Finger Test normal.      Gait: Gait is intact.   Psychiatric:         Speech: Speech normal.          "Behavior: Behavior normal.       Vitals:    09/26/22 1301   Weight: 97 kg (213 lb 13.5 oz)   Height: 5' 6" (1.676 m)       Assessment & Plan:    Problem List Items Addressed This Visit    None  Visit Diagnoses       Right hand weakness    -  Primary    Relevant Orders    Ambulatory referral/consult to Physical/Occupational Therapy            Follow-up: No follow-ups on file.    This note was done with the assistance of voice recognition software. Some errors may be present after proofreading.        "

## 2022-09-28 ENCOUNTER — CLINICAL SUPPORT (OUTPATIENT)
Dept: REHABILITATION | Facility: HOSPITAL | Age: 47
End: 2022-09-28
Attending: NEUROLOGICAL SURGERY
Payer: COMMERCIAL

## 2022-09-28 DIAGNOSIS — R63.39 SELF-CARE DEFICIT FOR FEEDING: ICD-10-CM

## 2022-09-28 DIAGNOSIS — R29.898 DECREASED PINCH STRENGTH: ICD-10-CM

## 2022-09-28 DIAGNOSIS — R29.898 RIGHT HAND WEAKNESS: ICD-10-CM

## 2022-09-28 DIAGNOSIS — M79.631 PAIN IN RIGHT FOREARM: ICD-10-CM

## 2022-09-28 DIAGNOSIS — Z74.1 SELF-CARE DEFICIT FOR DRESSING AND GROOMING: ICD-10-CM

## 2022-09-28 DIAGNOSIS — R29.898 DECREASED GRIP STRENGTH OF RIGHT HAND: ICD-10-CM

## 2022-09-28 PROCEDURE — 97165 OT EVAL LOW COMPLEX 30 MIN: CPT | Mod: PN

## 2022-09-28 NOTE — PATIENT INSTRUCTIONS
[facility] OCCUPATIONAL [facility]ERAP[facility]  HOME EXERCISE PROGRAM                                              Complete the following exercises with 10 repetitions each, 2x/day.   AROM: Wrist Flexion / Extension               Bend your wrist forward and back as far as possible.      AROM: Wrist Radial / Ulnar Deviation  Bend your wrist from side to side as far as possible.    AROM: Wrist Flexion / Extension  Make a fist, then bend your wrist forward then back as far as possible.         AROM: Wrist Radial / Ulnar Deviation   Make a fist then bend your wrist toward your body, then away.         Copyright © I. All rights reserved.     Therapist: ELVIA Farooq

## 2022-09-28 NOTE — PLAN OF CARE
[facility] Occupational Therapy  Initial Evaluation     Date:  9/28/2022    Name: Hany Guerin  Clinic Number: [id number]    Therapy Diagnosis:   1. Right hand weakness  Ambulatory referral/consult to Physical/Occupational Therapy      2. Decreased  strength of right hand        3. Decreased pinch strength        4. Pain in right forearm        5. Self-care deficit for dressing and grooming        6. Self-care deficit for feeding            Physician: Silas Galvan MD     Physician Orders: Eval and Treat  Medical Diagnosis: Right hand weakness  Surgical Procedure and Date: n/a  Evaluation Date: 09/28/2022   Insurance Authorization Period Expiration: tbd  Plan of Care Certification Period: 9/28/22 - 12/7/22  Date of Return to MD: no f/u with referring provider    Visit # / Visits authorized: 1 / tbd    Time In:8:49 am  Time Out: 9:30 am  Total Billable Time: 41 minutes    Precautions:  Diabetes    Subjective     Involved Side: right   Dominant Side: Right  Date of Onset: two months ago  Mechanism of Injury: fell asleep with my hand behind my head and couldn't move my right wrist   History of Current Condition: still weak difficulty with picking up items, wrist with flex  Surgical Procedure: n/a  Imaging: xray   TECHNIQUE:  PA, lateral, and oblique views of the right hand were performed.  COMPARISON:  None  FINDINGS:  No fracture or dislocation.  No bone destruction identified.  Mild DJD.  Impression:  See above  Electronically signed by: Cornell Tan MD  Date:                                            08/17/2022  Previous Therapy: none    Past Medical History/Physical Systems Review:   Hany Guerin  has a past medical history of Diabetes mellitus, High cholesterol, and Hypertension.    Hany Guerin  has a past surgical history that includes Cholecystectomy.    Hany has a current medication list which includes the following prescription(s): amlodipine-olmesartan,  dexcom g6 , dexcom g6 sensor, dexcom g6 transmitter, doxycycline, freestyle hanna 2 sensor, freestyle hanna 14 day reader, gabapentin, hydrochlorothiazide, metronidazole 1%, and ozempic.    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     Current FunctionalStatus   Home/Living environment : lives with their family      Limitation of Functional Status as follows:   ADLs/IADLs:     - Feeding: coupling with utensil modified    - Bathing: I    - Dressing/Grooming: hand washing is different, challenging with fasteners    - Driving: turning is modified     Leisure: Fishing    Objective    Observation: Skin intact, hand dirty from working     Sensation: Radial Nerve Intact  [place] Luciano Monofilament Test: Yes  Results: 2.83    Special Tests:   Tinels  negative and Phalens  positive  Wound Assessment: n/a    Edema: Circumferential measurements: none noted    Range of Motion: right Active full fist and thumb opposition to all tips and 5th MCP head  Thumb Opposition: WNL  Palmar Abduction: WNL  Radial Abduction: WNL                           Left       right   Wrist Ext/Flex: 70/60     45/60  Wrist RD/UD: 30/15    30/10  Supination/Pronation: 45/WNL   45/WNL    Manual Muscle Test:     Wrist Left Right   Flexion 4 5   Extension 4 3-   Radial Deviation 4- 3   Ulnar Deviation 4 5   Supination 5 4-   Pronation 5 4+      Strength: (JOLIE Dynamometer in lbs.) Average 3 trials, Position II  Right:  44.6#  Left: 86.6#    Pinch Strength: (Pinch Gauge in psi's), Average 3 trials  Gilbert Pinch R) 14 psi's   L) 20 psi's  3pt Pinch   R) 8.6 psi's  L) 17psi's    Fine Emeterio Coordination Tests:   9 hole Peg Test R) 24 seconds 1 drop   L) 19 second 1 drop      CMS Impairment/Limitation/Restriction for FOTO hand Survey    Therapist reviewed FOTO scores for Hany Guerin on 9/28/2022.   FOTO documents entered into [employer] - see Media section.    Limitation Score: to be completed         Treatment     Home Exercise Program/Education:  Issued HEP (see patient instructions in EMR) and educated on modality use for pain management . Exercises were reviewed and Martín was able to demonstrate them prior to the end of the session.   Pt received a written copy of exercises to perform at home. Martín demonstrated good  understanding of the education provided.  Pt was advised to perform these exercises free of pain, and to stop performing them if pain occurs.    Patient/Family Education: role of OT, goals for OT, scheduling/cancellations - pt verbalized understanding. Discussed insurance limitations with patient.    Additional Education provided: continue use of his wrist support with work activities, education regarding: the recovery of the nerve as it affects his strength.    Assessment     Hany Guerin is a 47 y.o. male referred to outpatient occupational therapy and presents with a medical diagnosis of Right hand weakness, resulting in Decreased ROM, Decreased  strength, Decreased pinch strength, Decreased muscle strength, Decreased functional hand use, and Increased pain and demonstrates limitations as described in the chart below. Following medical record review it is determined that pt will benefit from occupational therapy services in order to maximize pain free and/or functional use of right wrist/forearm/hand. The following goals were discussed with the patient and patient is in agreement with them as to be  addressed in the treatment plan. The patient's rehab potential is Good.     Anticipated barriers to occupational therapy: none noted   Pt has no cultural, educational or language barriers to learning provided.    Profile and History Assessment of Occupational Performance Level of Clinical Decision Making Complexity Score   Occupational Profile:   Hany Guerin is a 47 y.o. male who lives with their family and is currently self-employed as . Hany Guerin has difficulty with  feeding, grooming, dressing, and tool use  driving/transportation management  affecting his/her daily functional abilities. His/her main goal for therapy is  improve the strength in my wrist.     Comorbidities:   diabetes, HTN    Medical and Therapy History Review:   Brief               Performance Deficits    Physical:  Joint Mobility  Muscle Power/Strength   Strength  Pinch Strength  Pain    Cognitive:  No Deficits    Psychosocial:    No Deficits     Clinical Decision Making:  low    Assessment Process:  Problem-Focused Assessments    Modification/Need for Assistance:  Minimal-Moderate Modifications/Assistance    Intervention Selection:  Several Treatment Options       low  Based on PMHX, co morbidities , data from assessments and functional level of assistance required with task and clinical presentation directly impacting function.       The following goals were discussed with the patient and patient is in agreement with them as to be addressed in the treatment plan.     Goals:   Short term goals to be met in 5 weeks(11/2/22)  1)  Patient to be IND with HEP and modalities for pain management  2)  Increase right wrist extension 10* degrees to increase functional hand use for ADLs/work/leisure activities  3)   Increase  strength 10 lbs. to grasp tools for work  4)   Pt will improve 9 hole peg test by 4 seconds-demonstrating improvement in manipulation task  5)   Pt will report MD 3 out of 10 at worst    Long  term goals to be met by discharge:   Pt will demonstrate 60* wrist extension to the left hand  for orientation of hand for tool use  Pt will increase right  strength to 60# for lifting and carry   Pt will report [place] of 1 out of 10 at worst during use at home and work.    Plan   Certification Period/Plan of care expiration: 9/28/2022 to 12/7/22.    Outpatient Occupational Therapy 2 times weekly for 10 weeks to include the following interventions: Fluidotherapy, Therapeutic exercises/activities., and Strengthening.      ELVIA Farooq LOTR    I certify the need for these services furnished under this plan of treatment and while under my care    ____________________________________  Physician/Referring Practitioner    _______________  Date of Signature

## 2022-10-05 ENCOUNTER — OFFICE VISIT (OUTPATIENT)
Dept: INFECTIOUS DISEASES | Facility: CLINIC | Age: 47
End: 2022-10-05
Payer: COMMERCIAL

## 2022-10-05 ENCOUNTER — LAB VISIT (OUTPATIENT)
Dept: LAB | Facility: HOSPITAL | Age: 47
End: 2022-10-05
Payer: COMMERCIAL

## 2022-10-05 VITALS
WEIGHT: 208.75 LBS | HEIGHT: 66 IN | SYSTOLIC BLOOD PRESSURE: 162 MMHG | BODY MASS INDEX: 33.55 KG/M2 | DIASTOLIC BLOOD PRESSURE: 92 MMHG | TEMPERATURE: 99 F | HEART RATE: 93 BPM

## 2022-10-05 DIAGNOSIS — M86.672 CHRONIC OSTEOMYELITIS OF LEFT FOOT: ICD-10-CM

## 2022-10-05 DIAGNOSIS — M86.672 CHRONIC OSTEOMYELITIS OF LEFT FOOT: Primary | ICD-10-CM

## 2022-10-05 LAB
ALBUMIN SERPL BCP-MCNC: 3.7 G/DL (ref 3.5–5.2)
ALP SERPL-CCNC: 88 U/L (ref 55–135)
ALT SERPL W/O P-5'-P-CCNC: 24 U/L (ref 10–44)
ANION GAP SERPL CALC-SCNC: 8 MMOL/L (ref 8–16)
AST SERPL-CCNC: 19 U/L (ref 10–40)
BASOPHILS # BLD AUTO: 0.06 K/UL (ref 0–0.2)
BASOPHILS NFR BLD: 0.5 % (ref 0–1.9)
BILIRUB SERPL-MCNC: 0.7 MG/DL (ref 0.1–1)
BUN SERPL-MCNC: 16 MG/DL (ref 6–20)
CALCIUM SERPL-MCNC: 9 MG/DL (ref 8.7–10.5)
CHLORIDE SERPL-SCNC: 103 MMOL/L (ref 95–110)
CO2 SERPL-SCNC: 26 MMOL/L (ref 23–29)
CREAT SERPL-MCNC: 1.1 MG/DL (ref 0.5–1.4)
CRP SERPL-MCNC: 19.8 MG/L (ref 0–8.2)
DIFFERENTIAL METHOD: ABNORMAL
EOSINOPHIL # BLD AUTO: 0.2 K/UL (ref 0–0.5)
EOSINOPHIL NFR BLD: 1.2 % (ref 0–8)
ERYTHROCYTE [DISTWIDTH] IN BLOOD BY AUTOMATED COUNT: 14.9 % (ref 11.5–14.5)
EST. GFR  (NO RACE VARIABLE): >60 ML/MIN/1.73 M^2
GLUCOSE SERPL-MCNC: 121 MG/DL (ref 70–110)
HCT VFR BLD AUTO: 40.4 % (ref 40–54)
HGB BLD-MCNC: 13.1 G/DL (ref 14–18)
IMM GRANULOCYTES # BLD AUTO: 0.04 K/UL (ref 0–0.04)
IMM GRANULOCYTES NFR BLD AUTO: 0.3 % (ref 0–0.5)
LYMPHOCYTES # BLD AUTO: 1.9 K/UL (ref 1–4.8)
LYMPHOCYTES NFR BLD: 15.1 % (ref 18–48)
MCH RBC QN AUTO: 29 PG (ref 27–31)
MCHC RBC AUTO-ENTMCNC: 32.4 G/DL (ref 32–36)
MCV RBC AUTO: 90 FL (ref 82–98)
MONOCYTES # BLD AUTO: 0.9 K/UL (ref 0.3–1)
MONOCYTES NFR BLD: 7.7 % (ref 4–15)
NEUTROPHILS # BLD AUTO: 9.2 K/UL (ref 1.8–7.7)
NEUTROPHILS NFR BLD: 75.2 % (ref 38–73)
NRBC BLD-RTO: 0 /100 WBC
PLATELET # BLD AUTO: 152 K/UL (ref 150–450)
PMV BLD AUTO: 11.8 FL (ref 9.2–12.9)
POTASSIUM SERPL-SCNC: 3.9 MMOL/L (ref 3.5–5.1)
PROT SERPL-MCNC: 7.6 G/DL (ref 6–8.4)
RBC # BLD AUTO: 4.51 M/UL (ref 4.6–6.2)
SODIUM SERPL-SCNC: 137 MMOL/L (ref 136–145)
WBC # BLD AUTO: 12.22 K/UL (ref 3.9–12.7)

## 2022-10-05 PROCEDURE — 3008F BODY MASS INDEX DOCD: CPT | Mod: CPTII,S$GLB,, | Performed by: REGISTERED NURSE

## 2022-10-05 PROCEDURE — 3046F HEMOGLOBIN A1C LEVEL >9.0%: CPT | Mod: CPTII,S$GLB,, | Performed by: REGISTERED NURSE

## 2022-10-05 PROCEDURE — 36415 COLL VENOUS BLD VENIPUNCTURE: CPT | Performed by: REGISTERED NURSE

## 2022-10-05 PROCEDURE — 99204 PR OFFICE/OUTPT VISIT, NEW, LEVL IV, 45-59 MIN: ICD-10-PCS | Mod: S$GLB,,, | Performed by: REGISTERED NURSE

## 2022-10-05 PROCEDURE — 85025 COMPLETE CBC W/AUTO DIFF WBC: CPT | Performed by: REGISTERED NURSE

## 2022-10-05 PROCEDURE — 99204 OFFICE O/P NEW MOD 45 MIN: CPT | Mod: S$GLB,,, | Performed by: REGISTERED NURSE

## 2022-10-05 PROCEDURE — 3046F PR MOST RECENT HEMOGLOBIN A1C LEVEL > 9.0%: ICD-10-PCS | Mod: CPTII,S$GLB,, | Performed by: REGISTERED NURSE

## 2022-10-05 PROCEDURE — 80053 COMPREHEN METABOLIC PANEL: CPT | Performed by: REGISTERED NURSE

## 2022-10-05 PROCEDURE — 3077F SYST BP >= 140 MM HG: CPT | Mod: CPTII,S$GLB,, | Performed by: REGISTERED NURSE

## 2022-10-05 PROCEDURE — 3008F PR BODY MASS INDEX (BMI) DOCUMENTED: ICD-10-PCS | Mod: CPTII,S$GLB,, | Performed by: REGISTERED NURSE

## 2022-10-05 PROCEDURE — 3080F PR MOST RECENT DIASTOLIC BLOOD PRESSURE >= 90 MM HG: ICD-10-PCS | Mod: CPTII,S$GLB,, | Performed by: REGISTERED NURSE

## 2022-10-05 PROCEDURE — 99999 PR PBB SHADOW E&M-EST. PATIENT-LVL IV: ICD-10-PCS | Mod: PBBFAC,,, | Performed by: REGISTERED NURSE

## 2022-10-05 PROCEDURE — 3080F DIAST BP >= 90 MM HG: CPT | Mod: CPTII,S$GLB,, | Performed by: REGISTERED NURSE

## 2022-10-05 PROCEDURE — 1159F MED LIST DOCD IN RCRD: CPT | Mod: CPTII,S$GLB,, | Performed by: REGISTERED NURSE

## 2022-10-05 PROCEDURE — 99999 PR PBB SHADOW E&M-EST. PATIENT-LVL IV: CPT | Mod: PBBFAC,,, | Performed by: REGISTERED NURSE

## 2022-10-05 PROCEDURE — 1159F PR MEDICATION LIST DOCUMENTED IN MEDICAL RECORD: ICD-10-PCS | Mod: CPTII,S$GLB,, | Performed by: REGISTERED NURSE

## 2022-10-05 PROCEDURE — 4010F ACE/ARB THERAPY RXD/TAKEN: CPT | Mod: CPTII,S$GLB,, | Performed by: REGISTERED NURSE

## 2022-10-05 PROCEDURE — 86140 C-REACTIVE PROTEIN: CPT | Performed by: REGISTERED NURSE

## 2022-10-05 PROCEDURE — 4010F PR ACE/ARB THEARPY RXD/TAKEN: ICD-10-PCS | Mod: CPTII,S$GLB,, | Performed by: REGISTERED NURSE

## 2022-10-05 PROCEDURE — 3077F PR MOST RECENT SYSTOLIC BLOOD PRESSURE >= 140 MM HG: ICD-10-PCS | Mod: CPTII,S$GLB,, | Performed by: REGISTERED NURSE

## 2022-10-05 RX ORDER — LEVOFLOXACIN 750 MG/1
750 TABLET ORAL DAILY
Qty: 42 TABLET | Refills: 0 | Status: SHIPPED | OUTPATIENT
Start: 2022-10-05 | End: 2022-11-16

## 2022-10-05 NOTE — PROGRESS NOTES
Subjective:       Patient ID: Hany Guerin is a 47 y.o. male.    Chief Complaint: + wound culture   HPI    Mr. Guerin is a 47 year old male with a PMH of HTN, DMT2, right great toe amputation, and chronic osteo of left foot. Pt was sent to ID as a referral from podiatry d/t + wound culture of left foot wound from 7/12/22, + for Klebsiella and GBS. Bone biopsy from 8/5/22 culture negative, pathology reflecting a potential late stage osteo. MRI from 7/13 concerning for osteo of 5th metatarsal to middle phalanx.     Today, pt denies fever, body aches, chills. Denies NVD. Reports pain on big toe. Denies pain on wound area. Denies drainage. Reporting minimal feeling in feet. Pt reports he is followed by his PCP for his HTN and DM, reports he took his HTN medication right before appt.     To note, pt works as a  and reports something feel on his left great toe a few days ago. Toe nail appears swollen and about to fall off. Reports pain. States he will follow with wound care soon.      Reports he wears a CBG - fell off a couple days ago. Reports blood sugars 130-220 if not higher. Plans to replace soon.     Follows with wound care- last visit a couple weeks ago. Preforming wound care every other day. Missed last wound care appt, follow up TBD.                      Review of Systems   Constitutional:  Negative for chills, diaphoresis, fatigue and fever.   HENT: Negative.     Respiratory:  Negative for cough, chest tightness and shortness of breath.    Cardiovascular:  Negative for chest pain, palpitations and leg swelling.   Gastrointestinal:  Negative for abdominal pain, constipation, diarrhea, nausea and vomiting.   Genitourinary:  Positive for urgency. Negative for difficulty urinating, dysuria and hematuria.   Musculoskeletal:  Positive for back pain (chronic). Negative for arthralgias and myalgias.   Integumentary:  Positive for wound.   Allergic/Immunologic: Negative for environmental allergies, food  allergies and frequent infections.   Neurological:  Positive for numbness (bilateral feet). Negative for dizziness, weakness and headaches.   Psychiatric/Behavioral:  Negative for agitation and confusion. The patient is not nervous/anxious.        Objective:      Physical Exam  Vitals reviewed.   Constitutional:       General: He is not in acute distress.     Appearance: Normal appearance. He is well-developed and normal weight. He is not ill-appearing, toxic-appearing or diaphoretic.   HENT:      Head: Normocephalic and atraumatic.      Nose: Nose normal.      Mouth/Throat:      Mouth: Mucous membranes are moist.      Dentition: Normal dentition. Does not have dentures. No dental caries or dental abscesses.      Pharynx: Oropharynx is clear. No oropharyngeal exudate.   Eyes:      General: No scleral icterus.     Conjunctiva/sclera: Conjunctivae normal.   Cardiovascular:      Rate and Rhythm: Normal rate and regular rhythm.      Heart sounds: Normal heart sounds. No murmur heard.  Pulmonary:      Effort: Pulmonary effort is normal. No respiratory distress.      Breath sounds: Normal breath sounds. No wheezing or rales.   Abdominal:      General: Bowel sounds are normal. There is no distension.      Palpations: Abdomen is soft.      Tenderness: There is no abdominal tenderness. There is no guarding.   Musculoskeletal:         General: Normal range of motion.      Cervical back: Normal range of motion.      Right lower leg: No edema.      Left lower leg: No edema.   Skin:     General: Skin is warm and dry.      Findings: No erythema or rash.      Comments: Diabetic foot ulcer, see media. No drainage, not TTP.    Neurological:      General: No focal deficit present.      Mental Status: He is alert and oriented to person, place, and time. Mental status is at baseline.      Motor: No weakness.      Gait: Gait normal.   Psychiatric:         Mood and Affect: Mood normal.         Behavior: Behavior normal.         Thought  Content: Thought content normal.         Judgment: Judgment normal.       Assessment:       Problem List Items Addressed This Visit          ID    Chronic osteomyelitis of left foot - Primary    Relevant Medications    levoFLOXacin (LEVAQUIN) 750 MG tablet    Other Relevant Orders    EKG 12-lead    CBC Auto Differential (Completed)    COMPREHENSIVE METABOLIC PANEL (Completed)    C-REACTIVE PROTEIN (Completed)       Plan:       Recommendations:     - Wound appears to be healing nicely. Will treat for SSTI x2 weeks and revaluate. Start Levaquin 750mg PO qday  - CBC, CMP, CRP today   - Baseline EKG  - Monitor for worsening s/s of infection from left great toe  - Follow up in 2 weeks - 10/19 - 0830  - Call or seek immediate medical attention for any fevers, chills, sweats, diarrhea, n/v or increase in pain, swelling, drainage from wound.                 30 minutes of total time was spent on this encounter, which includes face to face time and non-face to face time preparing to see the patient (eg, review of tests), Obtaining and/or reviewing separately obtained history, documenting clinical information in the electronic or other health record, independently interpreting results (not separately reported) and communicating results to the patient/family/caregiver, or care coordination (not separately reported).

## 2022-10-06 ENCOUNTER — DOCUMENTATION ONLY (OUTPATIENT)
Dept: REHABILITATION | Facility: HOSPITAL | Age: 47
End: 2022-10-06

## 2022-10-06 DIAGNOSIS — R29.898 DECREASED GRIP STRENGTH OF RIGHT HAND: Primary | ICD-10-CM

## 2022-10-06 DIAGNOSIS — R63.39 SELF-CARE DEFICIT FOR FEEDING: ICD-10-CM

## 2022-10-06 DIAGNOSIS — R29.898 DECREASED PINCH STRENGTH: ICD-10-CM

## 2022-10-06 DIAGNOSIS — Z74.1 SELF-CARE DEFICIT FOR DRESSING AND GROOMING: ICD-10-CM

## 2022-10-06 DIAGNOSIS — M79.631 PAIN IN RIGHT FOREARM: ICD-10-CM

## 2022-10-06 NOTE — PROGRESS NOTES
No Show Note/Documentation    Patient: Hany Guerin  Date of Session: 10/6/2022  Diagnosis:   1. Decreased  strength of right hand        2. Decreased pinch strength        3. Pain in right forearm        4. Self-care deficit for dressing and grooming        5. Self-care deficit for feeding          MRN: [id number]    Hany Guerin did not attend his scheduled therapy appointment today. He did not call to cancel nor reschedule. This is the first appointment that Martín has not attended. Next appointment is scheduled for 10/13/22 and will follow up with patient at that time. No charges have been posted today.     Contacted Martín by phone stating he completely forgot as he was busy at work.  He was reminded of his next appointment.    ELVIA Farooq  10/6/2022

## 2022-10-10 ENCOUNTER — PATIENT MESSAGE (OUTPATIENT)
Dept: ADMINISTRATIVE | Facility: HOSPITAL | Age: 47
End: 2022-10-10
Payer: COMMERCIAL

## 2022-10-13 ENCOUNTER — DOCUMENTATION ONLY (OUTPATIENT)
Dept: REHABILITATION | Facility: HOSPITAL | Age: 47
End: 2022-10-13

## 2022-10-13 DIAGNOSIS — R63.39 SELF-CARE DEFICIT FOR FEEDING: ICD-10-CM

## 2022-10-13 DIAGNOSIS — M79.631 PAIN IN RIGHT FOREARM: ICD-10-CM

## 2022-10-13 DIAGNOSIS — R29.898 DECREASED PINCH STRENGTH: ICD-10-CM

## 2022-10-13 DIAGNOSIS — Z74.1 SELF-CARE DEFICIT FOR DRESSING AND GROOMING: ICD-10-CM

## 2022-10-13 DIAGNOSIS — R29.898 DECREASED GRIP STRENGTH OF RIGHT HAND: Primary | ICD-10-CM

## 2022-10-13 NOTE — PROGRESS NOTES
No Show Note/Documentation    Patient: Hany Guerin  Date of Session: 10/13/2022  Diagnosis:   1. Decreased  strength of right hand        2. Decreased pinch strength        3. Pain in right forearm        4. Self-care deficit for dressing and grooming        5. Self-care deficit for feeding          MRN: [id number]    Hany Guerin did not attend his scheduled therapy appointment today. He did not call to cancel nor reschedule. This is the second appointment that Martín has not attended. Next appointment is scheduled for 10/20/22 and will follow up with patient at that time. No charges have been posted today.     Unable to leave voicemail secondary to full mailbox.    ELVIA Farooq  10/13/2022

## 2022-10-20 ENCOUNTER — DOCUMENTATION ONLY (OUTPATIENT)
Dept: REHABILITATION | Facility: HOSPITAL | Age: 47
End: 2022-10-20

## 2022-10-20 ENCOUNTER — TELEPHONE (OUTPATIENT)
Dept: FAMILY MEDICINE | Facility: CLINIC | Age: 47
End: 2022-10-20
Payer: COMMERCIAL

## 2022-10-20 DIAGNOSIS — Z74.1 SELF-CARE DEFICIT FOR DRESSING AND GROOMING: ICD-10-CM

## 2022-10-20 DIAGNOSIS — M79.631 PAIN IN RIGHT FOREARM: ICD-10-CM

## 2022-10-20 DIAGNOSIS — R29.898 DECREASED GRIP STRENGTH OF RIGHT HAND: Primary | ICD-10-CM

## 2022-10-20 DIAGNOSIS — R29.898 DECREASED PINCH STRENGTH: ICD-10-CM

## 2022-10-20 DIAGNOSIS — R63.39 SELF-CARE DEFICIT FOR FEEDING: ICD-10-CM

## 2022-10-20 NOTE — PROGRESS NOTES
No Show Note/Documentation    Patient: Hany Guerin  Date of Session: 10/20/2022  Diagnosis:   1. Decreased  strength of right hand        2. Decreased pinch strength        3. Pain in right forearm        4. Self-care deficit for dressing and grooming        5. Self-care deficit for feeding          MRN: [id number]    Hany Guerin did not attend his scheduled therapy appointment today. He did not call to cancel nor reschedule. This is the 3rd appointment that Martín has not attended.  No charges have been posted today.   Pt will be removed from the schedule secondary to attendance policy.     ELVIA Farooq  10/20/2022

## 2022-11-09 ENCOUNTER — PATIENT MESSAGE (OUTPATIENT)
Dept: ORTHOPEDICS | Facility: CLINIC | Age: 47
End: 2022-11-09
Payer: COMMERCIAL

## 2022-11-09 DIAGNOSIS — E11.9 TYPE 2 DIABETES MELLITUS WITHOUT COMPLICATION: ICD-10-CM

## 2022-11-10 ENCOUNTER — TELEPHONE (OUTPATIENT)
Dept: ORTHOPEDICS | Facility: CLINIC | Age: 47
End: 2022-11-10
Payer: COMMERCIAL

## 2022-11-10 NOTE — TELEPHONE ENCOUNTER
Spoken with patient signifficant other about a change of his appt with Dr. Castañeda if there are any question please call the clinic

## 2022-11-14 ENCOUNTER — PATIENT MESSAGE (OUTPATIENT)
Dept: ADMINISTRATIVE | Facility: HOSPITAL | Age: 47
End: 2022-11-14
Payer: COMMERCIAL

## 2022-11-23 ENCOUNTER — DOCUMENTATION ONLY (OUTPATIENT)
Dept: REHABILITATION | Facility: HOSPITAL | Age: 47
End: 2022-11-23
Payer: COMMERCIAL

## 2022-11-23 DIAGNOSIS — M79.631 PAIN IN RIGHT FOREARM: ICD-10-CM

## 2022-11-23 DIAGNOSIS — R29.898 DECREASED GRIP STRENGTH OF RIGHT HAND: Primary | ICD-10-CM

## 2022-11-23 DIAGNOSIS — R63.39 SELF-CARE DEFICIT FOR FEEDING: ICD-10-CM

## 2022-11-23 DIAGNOSIS — Z74.1 SELF-CARE DEFICIT FOR DRESSING AND GROOMING: ICD-10-CM

## 2022-11-23 DIAGNOSIS — R29.898 DECREASED PINCH STRENGTH: ICD-10-CM

## 2022-11-23 NOTE — PROGRESS NOTES
[facility]  Occupational Therapy Discharge Note    [facility] [id number]    Therapy Diagnosis:   Encounter Diagnoses   Name Primary?    Decreased  strength of right hand Yes    Decreased pinch strength     Pain in right forearm     Self-care deficit for dressing and grooming     Self-care deficit for feeding      Physician: Silas Galvan MD      Physician Orders: Eval and Treat  Medical Diagnosis: Right hand weakness  Surgical Procedure and Date: n/a  Evaluation Date: 09/28/2022       Date of Last visit: 9/28/22  Total Visits Received: 1    ASSESSMENT    Unable to assess patient due to him not returning to therapy.    Discharge reason: Patient has not attended therapy since evaluation on 9/28/22    Discharge FOTO Score: not acquired    Goals: not met     PLAN   This patient is discharged from Occupational Therapy.      ELVIA Farooq

## 2022-11-25 ENCOUNTER — PATIENT MESSAGE (OUTPATIENT)
Dept: FAMILY MEDICINE | Facility: CLINIC | Age: 47
End: 2022-11-25
Payer: COMMERCIAL

## 2023-01-17 ENCOUNTER — PATIENT MESSAGE (OUTPATIENT)
Dept: ADMINISTRATIVE | Facility: HOSPITAL | Age: 48
End: 2023-01-17
Payer: COMMERCIAL

## 2023-01-19 ENCOUNTER — PATIENT MESSAGE (OUTPATIENT)
Dept: ADMINISTRATIVE | Facility: HOSPITAL | Age: 48
End: 2023-01-19
Payer: COMMERCIAL

## 2023-03-23 ENCOUNTER — PATIENT MESSAGE (OUTPATIENT)
Dept: ADMINISTRATIVE | Facility: HOSPITAL | Age: 48
End: 2023-03-23
Payer: COMMERCIAL

## 2023-04-12 ENCOUNTER — PATIENT MESSAGE (OUTPATIENT)
Dept: ADMINISTRATIVE | Facility: HOSPITAL | Age: 48
End: 2023-04-12
Payer: COMMERCIAL

## 2023-04-18 ENCOUNTER — PATIENT MESSAGE (OUTPATIENT)
Dept: ADMINISTRATIVE | Facility: HOSPITAL | Age: 48
End: 2023-04-18
Payer: COMMERCIAL

## 2023-06-30 ENCOUNTER — PATIENT OUTREACH (OUTPATIENT)
Dept: ADMINISTRATIVE | Facility: HOSPITAL | Age: 48
End: 2023-06-30
Payer: COMMERCIAL

## 2023-06-30 NOTE — PROGRESS NOTES
[facility] 1 BP Gap Report 05.01.23 -  Left message for patient to call our office. Updated blood pressure reading needed.    Overdue PCP, Diabetes Labs w/ Urine, Diabetic Eye  Exam, and Colorectal Screening - Left message for patient to call our office. The patient wife is requesting a call on next Friday 07/07/23, when I return back in the office.

## 2023-10-11 DIAGNOSIS — Z11.59 NEED FOR HEPATITIS C SCREENING TEST: ICD-10-CM

## 2023-11-06 ENCOUNTER — PATIENT MESSAGE (OUTPATIENT)
Dept: ADMINISTRATIVE | Facility: HOSPITAL | Age: 48
End: 2023-11-06
Payer: COMMERCIAL

## 2023-12-18 ENCOUNTER — PATIENT MESSAGE (OUTPATIENT)
Dept: FAMILY MEDICINE | Facility: CLINIC | Age: 48
End: 2023-12-18
Payer: COMMERCIAL

## 2024-03-04 ENCOUNTER — PATIENT MESSAGE (OUTPATIENT)
Dept: ADMINISTRATIVE | Facility: HOSPITAL | Age: 49
End: 2024-03-04
Payer: COMMERCIAL

## 2024-06-04 ENCOUNTER — PATIENT MESSAGE (OUTPATIENT)
Dept: ADMINISTRATIVE | Facility: HOSPITAL | Age: 49
End: 2024-06-04
Payer: COMMERCIAL

## 2024-07-02 ENCOUNTER — HOSPITAL ENCOUNTER (INPATIENT)
Facility: HOSPITAL | Age: 49
LOS: 6 days | Discharge: HOME | DRG: 623 | End: 2024-07-08
Attending: EMERGENCY MEDICINE | Admitting: STUDENT IN AN ORGANIZED HEALTH CARE EDUCATION/TRAINING PROGRAM
Payer: MEDICAID

## 2024-07-02 DIAGNOSIS — M86.171 ACUTE OSTEOMYELITIS OF METATARSAL BONE OF RIGHT FOOT: Primary | ICD-10-CM

## 2024-07-02 DIAGNOSIS — I10 BENIGN ESSENTIAL HTN: ICD-10-CM

## 2024-07-02 DIAGNOSIS — M86.171 OTHER ACUTE OSTEOMYELITIS OF RIGHT FOOT: ICD-10-CM

## 2024-07-02 DIAGNOSIS — R07.9 CHEST PAIN: ICD-10-CM

## 2024-07-02 DIAGNOSIS — M25.571 ANKLE PAIN, RIGHT: ICD-10-CM

## 2024-07-02 DIAGNOSIS — R00.0 TACHYCARDIA: ICD-10-CM

## 2024-07-02 LAB
ALBUMIN SERPL BCP-MCNC: 1.9 G/DL (ref 3.5–5.2)
ALBUMIN SERPL-MCNC: 2.3 G/DL (ref 3.3–5.5)
ALLENS TEST: ABNORMAL
ALP SERPL-CCNC: 112 U/L (ref 42–141)
ALP SERPL-CCNC: 162 U/L (ref 55–135)
ALT SERPL W/O P-5'-P-CCNC: 11 U/L (ref 10–44)
ANION GAP SERPL CALC-SCNC: 6 MMOL/L (ref 8–16)
AST SERPL-CCNC: 15 U/L (ref 10–40)
BASOPHILS # BLD AUTO: 0.03 K/UL (ref 0–0.2)
BASOPHILS NFR BLD: 0.3 % (ref 0–1.9)
BILIRUB SERPL-MCNC: 0.5 MG/DL (ref 0.1–1)
BILIRUB SERPL-MCNC: 0.5 MG/DL (ref 0.2–1.6)
BILIRUBIN, POC UA: NEGATIVE
BLOOD, POC UA: ABNORMAL
BUN SERPL-MCNC: 13 MG/DL (ref 6–20)
BUN SERPL-MCNC: 13 MG/DL (ref 7–22)
CALCIUM SERPL-MCNC: 9.4 MG/DL (ref 8.7–10.5)
CALCIUM SERPL-MCNC: 9.6 MG/DL (ref 8–10.3)
CHLORIDE SERPL-SCNC: 90 MMOL/L (ref 98–108)
CHLORIDE SERPL-SCNC: 99 MMOL/L (ref 95–110)
CLARITY, POC UA: CLEAR
CO2 SERPL-SCNC: 28 MMOL/L (ref 23–29)
COLOR, POC UA: YELLOW
CREAT SERPL-MCNC: 0.9 MG/DL (ref 0.6–1.2)
CREAT SERPL-MCNC: 1 MG/DL (ref 0.5–1.4)
CRP SERPL-MCNC: 226.7 MG/L (ref 0–8.2)
DIFFERENTIAL METHOD BLD: ABNORMAL
EOSINOPHIL # BLD AUTO: 0.2 K/UL (ref 0–0.5)
EOSINOPHIL NFR BLD: 1.6 % (ref 0–8)
ERYTHROCYTE [DISTWIDTH] IN BLOOD BY AUTOMATED COUNT: 13.2 % (ref 11.5–14.5)
ERYTHROCYTE [SEDIMENTATION RATE] IN BLOOD BY PHOTOMETRIC METHOD: 119 MM/HR (ref 0–23)
EST. GFR  (NO RACE VARIABLE): >60 ML/MIN/1.73 M^2
GLUCOSE SERPL-MCNC: 192 MG/DL (ref 70–110)
GLUCOSE SERPL-MCNC: 564 MG/DL (ref 73–118)
GLUCOSE, POC UA: ABNORMAL
HCO3 UR-SCNC: 31.2 MMOL/L (ref 24–28)
HCT VFR BLD AUTO: 29.9 % (ref 40–54)
HCT, POC: NORMAL
HGB BLD-MCNC: 9.4 G/DL (ref 14–18)
HGB, POC: NORMAL (ref 14–18)
IMM GRANULOCYTES # BLD AUTO: 0.12 K/UL (ref 0–0.04)
IMM GRANULOCYTES NFR BLD AUTO: 1.3 % (ref 0–0.5)
INR PPP: 1 (ref 0.8–1.2)
KETONES, POC UA: NEGATIVE
LACTATE SERPL-SCNC: 1 MMOL/L (ref 0.5–2.2)
LDH SERPL L TO P-CCNC: 1.09 MMOL/L (ref 0.5–2.2)
LEUKOCYTE EST, POC UA: NEGATIVE
LYMPHOCYTES # BLD AUTO: 1.4 K/UL (ref 1–4.8)
LYMPHOCYTES NFR BLD: 15.4 % (ref 18–48)
MAGNESIUM SERPL-MCNC: 1.8 MG/DL (ref 1.6–2.6)
MCH RBC QN AUTO: 26.6 PG (ref 27–31)
MCH, POC: NORMAL
MCHC RBC AUTO-ENTMCNC: 31.4 G/DL (ref 32–36)
MCHC, POC: NORMAL
MCV RBC AUTO: 85 FL (ref 82–98)
MCV, POC: NORMAL
MONOCYTES # BLD AUTO: 1 K/UL (ref 0.3–1)
MONOCYTES NFR BLD: 10.7 % (ref 4–15)
MPV, POC: NORMAL
NEUTROPHILS # BLD AUTO: 6.4 K/UL (ref 1.8–7.7)
NEUTROPHILS NFR BLD: 70.7 % (ref 38–73)
NITRITE, POC UA: NEGATIVE
NRBC BLD-RTO: 0 /100 WBC
OHS QRS DURATION: 92 MS
OHS QTC CALCULATION: 425 MS
PCO2 BLDA: 53.7 MMHG (ref 35–45)
PH SMN: 7.37 [PH] (ref 7.35–7.45)
PH UR STRIP: 5.5 [PH]
PHOSPHATE SERPL-MCNC: 3.5 MG/DL (ref 2.7–4.5)
PLATELET # BLD AUTO: 277 K/UL (ref 150–450)
PMV BLD AUTO: 10.4 FL (ref 9.2–12.9)
PO2 BLDA: 44 MMHG (ref 40–60)
POC ALT (SGPT): 7 U/L (ref 10–47)
POC AST (SGOT): 13 U/L (ref 11–38)
POC BE: 5 MMOL/L
POC CARDIAC TROPONIN I: 0 NG/ML (ref 0–0.08)
POC PLATELET COUNT: NORMAL
POC SATURATED O2: 77 % (ref 95–100)
POC TCO2: 31 MMOL/L (ref 18–33)
POC TCO2: 33 MMOL/L (ref 24–29)
POCT GLUCOSE: 272 MG/DL (ref 70–110)
POCT GLUCOSE: 291 MG/DL (ref 70–110)
POCT GLUCOSE: 349 MG/DL (ref 70–110)
POCT GLUCOSE: 432 MG/DL (ref 70–110)
POCT GLUCOSE: 478 MG/DL (ref 70–110)
POCT GLUCOSE: >500 MG/DL (ref 70–110)
POCT GLUCOSE: >500 MG/DL (ref 70–110)
POTASSIUM BLD-SCNC: 5 MMOL/L (ref 3.6–5.1)
POTASSIUM SERPL-SCNC: 4.9 MMOL/L (ref 3.5–5.1)
PROCALCITONIN SERPL IA-MCNC: 0.27 NG/ML
PROT SERPL-MCNC: 8.1 G/DL (ref 6–8.4)
PROTEIN, POC UA: ABNORMAL
PROTEIN, POC: 9 G/DL (ref 6.4–8.1)
PROTHROMBIN TIME: 11.4 SEC (ref 9–12.5)
RBC # BLD AUTO: 3.53 M/UL (ref 4.6–6.2)
RBC, POC: NORMAL
RDW, POC: NORMAL
SAMPLE: ABNORMAL
SAMPLE: NORMAL
SITE: ABNORMAL
SODIUM BLD-SCNC: 130 MMOL/L (ref 128–145)
SODIUM SERPL-SCNC: 133 MMOL/L (ref 136–145)
SPECIFIC GRAVITY, POC UA: 1.01
UROBILINOGEN, POC UA: 0.2 E.U./DL
WBC # BLD AUTO: 9.1 K/UL (ref 3.9–12.7)
WBC, POC: NORMAL

## 2024-07-02 PROCEDURE — 83735 ASSAY OF MAGNESIUM: CPT | Performed by: STUDENT IN AN ORGANIZED HEALTH CARE EDUCATION/TRAINING PROGRAM

## 2024-07-02 PROCEDURE — 85025 COMPLETE CBC W/AUTO DIFF WBC: CPT | Performed by: STUDENT IN AN ORGANIZED HEALTH CARE EDUCATION/TRAINING PROGRAM

## 2024-07-02 PROCEDURE — 85610 PROTHROMBIN TIME: CPT | Performed by: STUDENT IN AN ORGANIZED HEALTH CARE EDUCATION/TRAINING PROGRAM

## 2024-07-02 PROCEDURE — 80053 COMPREHEN METABOLIC PANEL: CPT | Performed by: STUDENT IN AN ORGANIZED HEALTH CARE EDUCATION/TRAINING PROGRAM

## 2024-07-02 PROCEDURE — 87070 CULTURE OTHR SPECIMN AEROBIC: CPT | Performed by: NURSE PRACTITIONER

## 2024-07-02 PROCEDURE — 93010 ELECTROCARDIOGRAM REPORT: CPT | Mod: ,,, | Performed by: INTERNAL MEDICINE

## 2024-07-02 PROCEDURE — 63600175 PHARM REV CODE 636 W HCPCS: Mod: ER | Performed by: EMERGENCY MEDICINE

## 2024-07-02 PROCEDURE — 81003 URINALYSIS AUTO W/O SCOPE: CPT | Mod: ER

## 2024-07-02 PROCEDURE — 85025 COMPLETE CBC W/AUTO DIFF WBC: CPT | Mod: ER

## 2024-07-02 PROCEDURE — 87147 CULTURE TYPE IMMUNOLOGIC: CPT | Performed by: NURSE PRACTITIONER

## 2024-07-02 PROCEDURE — 25000003 PHARM REV CODE 250: Performed by: STUDENT IN AN ORGANIZED HEALTH CARE EDUCATION/TRAINING PROGRAM

## 2024-07-02 PROCEDURE — 21400001 HC TELEMETRY ROOM

## 2024-07-02 PROCEDURE — 84100 ASSAY OF PHOSPHORUS: CPT | Performed by: STUDENT IN AN ORGANIZED HEALTH CARE EDUCATION/TRAINING PROGRAM

## 2024-07-02 PROCEDURE — 84145 PROCALCITONIN (PCT): CPT | Performed by: STUDENT IN AN ORGANIZED HEALTH CARE EDUCATION/TRAINING PROGRAM

## 2024-07-02 PROCEDURE — 86140 C-REACTIVE PROTEIN: CPT | Performed by: STUDENT IN AN ORGANIZED HEALTH CARE EDUCATION/TRAINING PROGRAM

## 2024-07-02 PROCEDURE — 36415 COLL VENOUS BLD VENIPUNCTURE: CPT | Performed by: STUDENT IN AN ORGANIZED HEALTH CARE EDUCATION/TRAINING PROGRAM

## 2024-07-02 PROCEDURE — 85652 RBC SED RATE AUTOMATED: CPT | Performed by: STUDENT IN AN ORGANIZED HEALTH CARE EDUCATION/TRAINING PROGRAM

## 2024-07-02 PROCEDURE — 96366 THER/PROPH/DIAG IV INF ADDON: CPT | Mod: ER

## 2024-07-02 PROCEDURE — 25000003 PHARM REV CODE 250: Mod: ER | Performed by: EMERGENCY MEDICINE

## 2024-07-02 PROCEDURE — 84484 ASSAY OF TROPONIN QUANT: CPT | Mod: ER

## 2024-07-02 PROCEDURE — 93005 ELECTROCARDIOGRAM TRACING: CPT | Mod: ER

## 2024-07-02 PROCEDURE — 82962 GLUCOSE BLOOD TEST: CPT | Mod: ER

## 2024-07-02 PROCEDURE — 96372 THER/PROPH/DIAG INJ SC/IM: CPT | Performed by: EMERGENCY MEDICINE

## 2024-07-02 PROCEDURE — 96376 TX/PRO/DX INJ SAME DRUG ADON: CPT | Mod: ER

## 2024-07-02 PROCEDURE — 83605 ASSAY OF LACTIC ACID: CPT | Performed by: STUDENT IN AN ORGANIZED HEALTH CARE EDUCATION/TRAINING PROGRAM

## 2024-07-02 PROCEDURE — 99285 EMERGENCY DEPT VISIT HI MDM: CPT | Mod: 25,ER

## 2024-07-02 PROCEDURE — 87040 BLOOD CULTURE FOR BACTERIA: CPT | Mod: 59 | Performed by: STUDENT IN AN ORGANIZED HEALTH CARE EDUCATION/TRAINING PROGRAM

## 2024-07-02 PROCEDURE — 80053 COMPREHEN METABOLIC PANEL: CPT | Mod: ER

## 2024-07-02 PROCEDURE — 96375 TX/PRO/DX INJ NEW DRUG ADDON: CPT | Mod: ER

## 2024-07-02 PROCEDURE — 83036 HEMOGLOBIN GLYCOSYLATED A1C: CPT | Performed by: STUDENT IN AN ORGANIZED HEALTH CARE EDUCATION/TRAINING PROGRAM

## 2024-07-02 PROCEDURE — 99900035 HC TECH TIME PER 15 MIN (STAT): Mod: ER

## 2024-07-02 PROCEDURE — 96361 HYDRATE IV INFUSION ADD-ON: CPT | Mod: ER

## 2024-07-02 PROCEDURE — 96365 THER/PROPH/DIAG IV INF INIT: CPT | Mod: 59,ER

## 2024-07-02 PROCEDURE — 63600175 PHARM REV CODE 636 W HCPCS: Performed by: INTERNAL MEDICINE

## 2024-07-02 PROCEDURE — 87040 BLOOD CULTURE FOR BACTERIA: CPT | Performed by: EMERGENCY MEDICINE

## 2024-07-02 PROCEDURE — 82803 BLOOD GASES ANY COMBINATION: CPT | Mod: ER

## 2024-07-02 PROCEDURE — 63600175 PHARM REV CODE 636 W HCPCS: Performed by: STUDENT IN AN ORGANIZED HEALTH CARE EDUCATION/TRAINING PROGRAM

## 2024-07-02 RX ORDER — GLUCAGON 1 MG
1 KIT INJECTION
Status: DISCONTINUED | OUTPATIENT
Start: 2024-07-02 | End: 2024-07-08 | Stop reason: HOSPADM

## 2024-07-02 RX ORDER — GLUCAGON 1 MG
1 KIT INJECTION
Status: DISCONTINUED | OUTPATIENT
Start: 2024-07-02 | End: 2024-07-04

## 2024-07-02 RX ORDER — POLYETHYLENE GLYCOL 3350 17 G/17G
17 POWDER, FOR SOLUTION ORAL 2 TIMES DAILY PRN
Status: DISCONTINUED | OUTPATIENT
Start: 2024-07-02 | End: 2024-07-08 | Stop reason: HOSPADM

## 2024-07-02 RX ORDER — TALC
6 POWDER (GRAM) TOPICAL NIGHTLY
Status: DISCONTINUED | OUTPATIENT
Start: 2024-07-02 | End: 2024-07-08 | Stop reason: HOSPADM

## 2024-07-02 RX ORDER — IBUPROFEN 200 MG
24 TABLET ORAL
Status: DISCONTINUED | OUTPATIENT
Start: 2024-07-02 | End: 2024-07-08 | Stop reason: HOSPADM

## 2024-07-02 RX ORDER — CIPROFLOXACIN 2 MG/ML
400 INJECTION, SOLUTION INTRAVENOUS
Status: DISCONTINUED | OUTPATIENT
Start: 2024-07-02 | End: 2024-07-02

## 2024-07-02 RX ORDER — MORPHINE SULFATE 4 MG/ML
4 INJECTION, SOLUTION INTRAMUSCULAR; INTRAVENOUS
Status: COMPLETED | OUTPATIENT
Start: 2024-07-02 | End: 2024-07-02

## 2024-07-02 RX ORDER — IBUPROFEN 200 MG
16 TABLET ORAL
Status: DISCONTINUED | OUTPATIENT
Start: 2024-07-02 | End: 2024-07-08 | Stop reason: HOSPADM

## 2024-07-02 RX ORDER — KETOROLAC TROMETHAMINE 30 MG/ML
15 INJECTION, SOLUTION INTRAMUSCULAR; INTRAVENOUS
Status: COMPLETED | OUTPATIENT
Start: 2024-07-02 | End: 2024-07-02

## 2024-07-02 RX ORDER — GABAPENTIN 100 MG/1
100 CAPSULE ORAL 3 TIMES DAILY
Status: DISCONTINUED | OUTPATIENT
Start: 2024-07-02 | End: 2024-07-08 | Stop reason: HOSPADM

## 2024-07-02 RX ORDER — CIPROFLOXACIN 2 MG/ML
400 INJECTION, SOLUTION INTRAVENOUS
Status: COMPLETED | OUTPATIENT
Start: 2024-07-02 | End: 2024-07-02

## 2024-07-02 RX ORDER — OXYCODONE AND ACETAMINOPHEN 5; 325 MG/1; MG/1
1 TABLET ORAL
Status: COMPLETED | OUTPATIENT
Start: 2024-07-02 | End: 2024-07-02

## 2024-07-02 RX ORDER — ACETAMINOPHEN 325 MG/1
650 TABLET ORAL EVERY 8 HOURS PRN
Status: DISCONTINUED | OUTPATIENT
Start: 2024-07-02 | End: 2024-07-04

## 2024-07-02 RX ORDER — INSULIN GLARGINE 100 [IU]/ML
18 INJECTION, SOLUTION SUBCUTANEOUS NIGHTLY
Status: DISCONTINUED | OUTPATIENT
Start: 2024-07-02 | End: 2024-07-04

## 2024-07-02 RX ORDER — INSULIN ASPART 100 [IU]/ML
0-5 INJECTION, SOLUTION INTRAVENOUS; SUBCUTANEOUS EVERY 6 HOURS PRN
Status: DISCONTINUED | OUTPATIENT
Start: 2024-07-02 | End: 2024-07-03

## 2024-07-02 RX ORDER — NALOXONE HCL 0.4 MG/ML
0.02 VIAL (ML) INJECTION
Status: DISCONTINUED | OUTPATIENT
Start: 2024-07-02 | End: 2024-07-08 | Stop reason: HOSPADM

## 2024-07-02 RX ORDER — IBUPROFEN 200 MG
1 TABLET ORAL DAILY
Status: DISCONTINUED | OUTPATIENT
Start: 2024-07-02 | End: 2024-07-08 | Stop reason: HOSPADM

## 2024-07-02 RX ADMIN — VANCOMYCIN HYDROCHLORIDE 2000 MG: 1 INJECTION, POWDER, LYOPHILIZED, FOR SOLUTION INTRAVENOUS at 10:07

## 2024-07-02 RX ADMIN — VANCOMYCIN HYDROCHLORIDE 1250 MG: 1.25 INJECTION, POWDER, LYOPHILIZED, FOR SOLUTION INTRAVENOUS at 09:07

## 2024-07-02 RX ADMIN — CIPROFLOXACIN 400 MG: 2 INJECTION, SOLUTION INTRAVENOUS at 09:07

## 2024-07-02 RX ADMIN — INSULIN GLARGINE 18 UNITS: 100 INJECTION, SOLUTION SUBCUTANEOUS at 05:07

## 2024-07-02 RX ADMIN — Medication 6 MG: at 08:07

## 2024-07-02 RX ADMIN — SODIUM CHLORIDE 1000 ML: 9 INJECTION, SOLUTION INTRAVENOUS at 09:07

## 2024-07-02 RX ADMIN — INSULIN ASPART 1 UNITS: 100 INJECTION, SOLUTION INTRAVENOUS; SUBCUTANEOUS at 08:07

## 2024-07-02 RX ADMIN — MORPHINE SULFATE 4 MG: 4 INJECTION INTRAVENOUS at 02:07

## 2024-07-02 RX ADMIN — SODIUM CHLORIDE 1000 ML: 9 INJECTION, SOLUTION INTRAVENOUS at 10:07

## 2024-07-02 RX ADMIN — OXYCODONE HYDROCHLORIDE AND ACETAMINOPHEN 1 TABLET: 5; 325 TABLET ORAL at 06:07

## 2024-07-02 RX ADMIN — INSULIN HUMAN 6 UNITS: 100 INJECTION, SOLUTION PARENTERAL at 10:07

## 2024-07-02 RX ADMIN — MEROPENEM 2 G: 1 INJECTION INTRAVENOUS at 05:07

## 2024-07-02 RX ADMIN — INSULIN HUMAN 9 UNITS: 100 INJECTION, SOLUTION PARENTERAL at 09:07

## 2024-07-02 RX ADMIN — KETOROLAC TROMETHAMINE 15 MG: 30 INJECTION, SOLUTION INTRAMUSCULAR at 06:07

## 2024-07-02 RX ADMIN — INSULIN HUMAN 10 UNITS: 100 INJECTION, SOLUTION PARENTERAL at 12:07

## 2024-07-02 RX ADMIN — ACETAMINOPHEN 650 MG: 325 TABLET ORAL at 08:07

## 2024-07-02 RX ADMIN — GABAPENTIN 100 MG: 100 CAPSULE ORAL at 08:07

## 2024-07-02 NOTE — ED NOTES
MD at BS to discuss results of pt X-ray.   MD again stressed her concern about pts right foot.   Daughter at BS speaking with father about admissions.   Pt agreeing to stay and be admitted to hospital

## 2024-07-02 NOTE — ED NOTES
Per Night RN shift report, pt only wants to be treated for his right ankle pain. Pt declined treatment for his blood glucose greater than 500 and his right foot wound.   Pt right big toe has been amputated.  During day shift RN assessment, pts right foot/ heel is cracked, red, and has a foul smelling drainage with a wound down to the bone. Pt States that he himself has been wrapping foot and that his foot appears better looking to him. Pt adamant that he only wants to receive treatment for his ankle pain.

## 2024-07-02 NOTE — H&P
"[facility] Medicine  History & Physical    Patient Name: Hany Guerin  MRN: [id number]  Patient Class: IP- Inpatient  Admission Date: 7/2/2024  Attending Physician: Nabeel Jovel MD   Primary Care Provider: Lyle Thorne MD         Patient information was obtained from patient, past medical records, and ER records.     Subjective:     Principal Problem:Acute osteomyelitis of metatarsal bone of right foot    Chief Complaint:   Chief Complaint   Patient presents with    Ankle Injury     Right ankle pain after "folding it back under my leg."  While cutting grass 2 days ago.         HPI:   Hany Guerin is a 48 y.o. male who has a past medical history of Diabetes mellitus, High cholesterol, and Hypertension, presented to the ED with CC of Ankle Pain after fall.       No fracture or significant pathology was discovered of his ankle other than potential avascular necrosis of distal tibia; however, Xray did show soft tissue swelling bony destruction and gas seen in the portions of the foot along the 1st metatarsal. Patient has had long langford with osteomyelitis in the past of the right foot. He has had 1st R toe amp previously. Does still have drainage from foot, constantly. He has not been to wound care in over a year. Has not been on antibiotics for 2 years. And is not taking any insulin and his glucose is >500 on admission. He is not in DKA; no ketones in urine and pH wnl. Sodium 130. Hb dropped to 15 to 9.4, SED rate 119 & CRP elevated, WBC and Cr in normal limits however (LRINEC score 9). Podiatry and ID consulted. PEN allergy, anaphylaxis. Started on Vanc+Tomi.         Past Medical History:   Diagnosis Date    Diabetes mellitus     High cholesterol     Hypertension        Past Surgical History:   Procedure Laterality Date    CHOLECYSTECTOMY         Review of patient's allergies indicates:   Allergen Reactions    Penicillins Anaphylaxis       No current facility-administered " medications on file prior to encounter.     Current Outpatient Medications on File Prior to Encounter   Medication Sig    amlodipine-olmesartan (KENZIE) 10-20 mg per tablet Take 1 tablet by mouth once daily.    blood-glucose meter,continuous (DEXCOM G6 ) Misc Use as needed to monitor blood glucose    blood-glucose sensor (DEXCOM G6 SENSOR) Alicja Use as needed to monitor blood glucose    blood-glucose transmitter (DEXCOM G6 TRANSMITTER) Alicja Use as needed to monitor blood glucose    flash glucose sensor (FREESTYLE COTY 2 SENSOR) Kit 1 Device by Misc.(Non-Drug; Combo Route) route every 14 (fourteen) days.    FREESTYLE COTY 14 DAY READER Misc Use with sensor as directed    gabapentin (NEURONTIN) 100 MG capsule Take 1 capsule (100 mg total) by mouth 3 (three) times daily.    hydroCHLOROthiazide (MICROZIDE) 12.5 mg capsule Take 1 capsule (12.5 mg total) by mouth every morning.    metronidazole 1% (METROGEL) 1 % Gel Apply topically every other day.    [DISCONTINUED] doxycycline (VIBRAMYCIN) 100 MG Cap Take 1 capsule (100 mg total) by mouth 2 (two) times daily.     Family History       Problem Relation (Age of Onset)    Diabetes Mother, Maternal Grandfather, Paternal Grandfather    Hypertension Mother, Father, Maternal Grandfather, Paternal Grandfather    Miscarriages / Stillbirths Paternal Grandmother          Tobacco Use    Smoking status: Every Day     Current packs/day: 1.00     Average packs/day: 1 pack/day for 30.0 years (30.0 ttl pk-yrs)     Types: Cigarettes     Start date: 7/12/1994    Smokeless tobacco: Never   Substance and Sexual Activity    Alcohol use: Yes     Alcohol/week: 3.0 - 4.0 standard drinks of alcohol     Types: 3 - 4 Cans of beer per week     Comment: Daily    Drug use: Yes     Frequency: 5.0 times per week     Types: Marijuana    Sexual activity: Yes     Partners: Female     Review of Systems   Constitutional:  Negative for activity change, fever and unexpected weight change.   HENT:   Negative for trouble swallowing and voice change.    Eyes:  Negative for photophobia and visual disturbance.   Respiratory:  Negative for cough and shortness of breath.    Cardiovascular:  Negative for chest pain, palpitations and leg swelling.   Gastrointestinal:  Negative for abdominal distention, abdominal pain, blood in stool, constipation, diarrhea, nausea and vomiting.   Endocrine: Negative for polydipsia and polyuria.   Genitourinary:  Negative for difficulty urinating, dysuria and hematuria.   Musculoskeletal:  Positive for arthralgias, gait problem and joint swelling.   Skin:  Positive for color change and wound. Negative for rash.   Allergic/Immunologic: Negative for immunocompromised state.   Neurological:  Negative for seizures, syncope, facial asymmetry and weakness.   Psychiatric/Behavioral:  Negative for agitation, behavioral problems and confusion.      Objective:     Vital Signs (Most Recent):  Temp: 99.4 °F (37.4 °C) (07/02/24 1600)  Pulse: 94 (07/02/24 1600)  Resp: 18 (07/02/24 1600)  BP: 139/82 (07/02/24 1600)  SpO2: 99 % (07/02/24 1600) Vital Signs (24h Range):  Temp:  [98.5 °F (36.9 °C)-99.4 °F (37.4 °C)] 99.4 °F (37.4 °C)  Pulse:  [] 94  Resp:  [17-20] 18  SpO2:  [95 %-99 %] 99 %  BP: (128-157)/(62-82) 139/82     Weight: 89.2 kg (196 lb 10.4 oz)  Body mass index is 31.74 kg/m².     Physical Exam  Vitals and nursing note reviewed.   Constitutional:       General: He is not in acute distress.     Appearance: He is well-developed. He is obese. He is not ill-appearing or diaphoretic.   HENT:      Head: Normocephalic and atraumatic.   Eyes:      General: No scleral icterus.     Pupils: Pupils are equal, round, and reactive to light.   Neck:      Thyroid: No thyromegaly.   Cardiovascular:      Rate and Rhythm: Normal rate and regular rhythm.      Heart sounds: No murmur heard.  Pulmonary:      Effort: Pulmonary effort is normal.      Breath sounds: Normal breath sounds. No stridor. No wheezing  or rales.   Abdominal:      General: There is no distension.      Palpations: Abdomen is soft.      Tenderness: There is no guarding.   Musculoskeletal:         General: Swelling and deformity present. Normal range of motion.      Cervical back: Normal range of motion.      Right lower leg: Edema present.      Left lower leg: No edema.   Skin:     General: Skin is warm.      Capillary Refill: Capillary refill takes less than 2 seconds.      Findings: Lesion present.   Neurological:      Mental Status: He is alert and oriented to person, place, and time.      Cranial Nerves: No cranial nerve deficit.      Motor: No weakness.   Psychiatric:         Mood and Affect: Mood normal.         Behavior: Behavior normal.         Thought Content: Thought content normal.         Judgment: Judgment normal.              CRANIAL NERVES     CN III, IV, VI   Pupils are equal, round, and reactive to light.           Recent Results (from the past 24 hour(s))   POCT glucose    Collection Time: 07/02/24  6:20 AM   Result Value Ref Range    POCT Glucose >500 (HH) 70 - 110 mg/dL   POCT CMP    Collection Time: 07/02/24  8:48 AM   Result Value Ref Range    Albumin, POC 2.3 3.3 - 5.5 g/dL    Alkaline Phosphatase,  42 - 141 U/L    ALT (SGPT), POC 7 (L) 10 - 47 U/L    AST (SGOT), POC 13 11 - 38 U/L    POC BUN 13 7 - 22 mg/dL    Calcium, POC 9.6 8.0 - 10.3 mg/dL    POC Chloride 90 (L) 98 - 108 mmol/L    POC Creatinine 0.9 0.6 - 1.2 mg/dL    POC Glucose 564 (HH) 73 - 118 mg/dL    POC Potassium 5.0 3.6 - 5.1 mmol/L    POC Sodium 130 128 - 145 mmol/L    Bilirubin, POC 0.5 0.2 - 1.6 mg/dL    POC TCO2 31 18 - 33 mmol/L    Protein, POC 9.0 (H) 6.4 - 8.1 g/dL   POCT CBC    Collection Time: 07/02/24  8:51 AM   Result Value Ref Range    Hematocrit      Hemoglobin      RBC      WBC      MCV      MCH, POC      MCHC      RDW-CV      Platelet Count, POC      MPV     ISTAT PROCEDURE    Collection Time: 07/02/24  8:55 AM   Result Value Ref Range    POC  PH 7.372 7.35 - 7.45    POC PCO2 53.7 (H) 35 - 45 mmHg    POC PO2 44 40 - 60 mmHg    POC HCO3 31.2 (H) 24 - 28 mmol/L    POC BE 5 (H) -2 to 2 mmol/L    POC SATURATED O2 77 95 - 100 %    POC Lactate 1.09 0.5 - 2.2 mmol/L    POC TCO2 33 (H) 24 - 29 mmol/L    Sample VENOUS     Site Other     Allens Test N/A    EKG 12-lead    Collection Time: 07/02/24  8:57 AM   Result Value Ref Range    QRS Duration 92 ms    [facility] QTC Calculation 425 ms   POCT glucose    Collection Time: 07/02/24 10:02 AM   Result Value Ref Range    POCT Glucose >500 (HH) 70 - 110 mg/dL   Troponin ISTAT    Collection Time: 07/02/24 10:42 AM   Result Value Ref Range    POC Cardiac Troponin I 0.00 0.00 - 0.08 ng/mL    Sample unknown    POCT glucose    Collection Time: 07/02/24 11:02 AM   Result Value Ref Range    POCT Glucose 432 (H) 70 - 110 mg/dL   POCT URINALYSIS W/O SCOPE    Collection Time: 07/02/24 11:50 AM   Result Value Ref Range    Glucose, UA 2+ (A)     Bilirubin, UA Negative     Ketones, UA Negative     Spec Grav UA 1.015     Blood, UA 1+ (A)     PH, UA 5.5     Protein, UA 2+ (A)     Urobilinogen, UA 0.2 E.U./dL    Nitrite, UA Negative     Leukocytes, UA Negative     Color, UA Yellow     Clarity, UA Clear    POCT glucose    Collection Time: 07/02/24 12:22 PM   Result Value Ref Range    POCT Glucose 478 (HH) 70 - 110 mg/dL   POCT glucose    Collection Time: 07/02/24  1:16 PM   Result Value Ref Range    POCT Glucose 291 (H) 70 - 110 mg/dL   CBC Auto Differential    Collection Time: 07/02/24  4:03 PM   Result Value Ref Range    WBC 9.10 3.90 - 12.70 K/uL    RBC 3.53 (L) 4.60 - 6.20 M/uL    Hemoglobin 9.4 (L) 14.0 - 18.0 g/dL    Hematocrit 29.9 (L) 40.0 - 54.0 %    MCV 85 82 - 98 fL    MCH 26.6 (L) 27.0 - 31.0 pg    MCHC 31.4 (L) 32.0 - 36.0 g/dL    RDW 13.2 11.5 - 14.5 %    Platelets 277 150 - 450 K/uL    MPV 10.4 9.2 - 12.9 fL    Immature Granulocytes 1.3 (H) 0.0 - 0.5 %    Gran # (ANC) 6.4 1.8 - 7.7 K/uL    Immature Grans (Abs) 0.12 (H) 0.00 -  0.04 K/uL    Lymph # 1.4 1.0 - 4.8 K/uL    Mono # 1.0 0.3 - 1.0 K/uL    Eos # 0.2 0.0 - 0.5 K/uL    Baso # 0.03 0.00 - 0.20 K/uL    nRBC 0 0 /100 WBC    Gran % 70.7 38.0 - 73.0 %    Lymph % 15.4 (L) 18.0 - 48.0 %    Mono % 10.7 4.0 - 15.0 %    Eosinophil % 1.6 0.0 - 8.0 %    Basophil % 0.3 0.0 - 1.9 %    Differential Method Automated        Microbiology Results (last 7 days)       Procedure Component Value Units Date/Time    Blood culture [[id number]] Collected: 07/02/24 1610    Order Status: Sent Specimen: Blood from Peripheral, Antecubital, Right Updated: 07/02/24 1612    Blood culture [[id number]] Collected: 07/02/24 1603    Order Status: Sent Specimen: Blood from Peripheral, Antecubital, Left Updated: 07/02/24 1610    Aerobic culture (Specify Source) **CANNOT BE ORDERED AS STAT** [[id number]] Collected: 07/02/24 0844    Order Status: Sent Specimen: Wound from Leg, Right Updated: 07/02/24 1306    Blood culture #2 **CANNOT BE ORDERED STAT** [[id number]] Collected: 07/02/24 0855    Order Status: Sent Specimen: Blood from Peripheral, Antecubital, Left Updated: 07/02/24 1254    Blood culture #1 **CANNOT BE ORDERED STAT** [[id number]] Collected: 07/02/24 0855    Order Status: Sent Specimen: Blood from Peripheral, Antecubital, Right Updated: 07/02/24 1254    Culture, Body Fluid (Aerobic) w/ GS [[id number]]     Order Status: Canceled Specimen: Body Fluid              Imaging Results               X-Ray Ankle Complete Right (Final result)  Result time 07/02/24 08:13:44      Final result by Fritz Gudino MD (07/02/24 08:13:44)                   Impression:      osteomyelitis of the foot This report was flagged in Epic as abnormal.  The time is it    COMMUNICATION  This critical result was discovered/received at 810.  The critical information above was relayed directly by me by telephone to Dr.Erin Rubio on 07/02/2024 at 08:12      Electronically signed by: Fritz Gudino MD  Date:    07/02/2024  Time:    08:13                Narrative:    EXAMINATION:  XR ANKLE COMPLETE 3 VIEW RIGHT    CLINICAL HISTORY:  Pain in right ankle and joints of right foot    TECHNIQUE:  AP, lateral, and oblique images of the right ankle were performed.    COMPARISON:  None    FINDINGS:  Radiographs of the ankle show the ankle mortise to be intact.  There is some irregularity of the distal tibia in the region of the medial malleolus that may represent some type of avulsion fracture.    There is soft tissue swelling bony destruction and gas seen in the portions of the foot along the 1st metatarsal that are visualized.  Possible osteomyelitis with sepsis is suggested.                                        Assessment/Plan:     * Acute osteomyelitis of 1st metatarsal bone of right foot  Xray: soft tissue swelling bony destruction and gas seen in the portions of the foot along the 1st metatarsal.   He has had 1st R toe amp previously  Sodium 130. Hb dropped to 15 to 9.4, SED rate 119 & CRP elevated  WBC and Cr in normal limits however  LRINEC score 9, high risk for necrotizing soft tissue infx (>93%)  Vanc + Tomi  ID consult  Podiatry consult  MRI forefoot and midfoot  Control DM better    Benign essential HTN  Hold BP meds in setting of infection and possible anesthetics  SBP goal 140-180 inpt    Type 2 diabetes mellitus with hyperglycemia, without long-term current use of insulin  Patient with uncontrolled DM with glucoses >500 on arrival  Not in DKA, normal pH and no ketones in urine  Needs better control of glucoses, to reduce infections- counseled   He lost his job/insurance, now applying for medicaid   IV 25 U given of Reg insulin  Will start long acting and Mod SSI      Tobacco dependence due to cigarettes  Nicotine patch ordered  4m counseling: The following was discussed concerning tobacco use:  Relevance of Quitting  Risk to Health  Long Term Risk  Risk for Others  Rewards of Quitting  Motivation Intervention to Quit      VTE Risk Mitigation  (From admission, onward)           Ordered     IP VTE HIGH RISK PATIENT  Once         07/02/24 1525     Place sequential compression device  Until discontinued         07/02/24 1525                                    Nabeel Jovel MD  Department of Hospital Medicine  [facility]

## 2024-07-02 NOTE — ASSESSMENT & PLAN NOTE
Patient with uncontrolled DM with glucoses >500 on arrival  Not in DKA, normal pH and no ketones in urine  Needs better control of glucoses, to reduce infections- counseled   He lost his job/insurance, now applying for medicaid   IV 25 U given of Reg insulin  Will start long acting and Mod SSI

## 2024-07-02 NOTE — NURSING
[facility]  Nurses Note -- 4 Eyes    Pt arrived to unit via stretcher with EMS from [place] ED. AAOx4. VSS on RA. Pt c/o pain to R foot. Pain medication admin right before transfer. R foot swollen, red and draining. Photos taken and uploaded to Epic. Bed in lowest locked position and call light within reach. Pt's daughter at bedside. Notified Dr. Jovel of pt's arrival.       7/2/2024       Skin assessed on: Admit      [x] No Pressure Injuries Present    [x]Prevention Measures Documented    [] Yes LDA  for Pressure Injury Previously documented     [] Yes New Pressure Injury Discovered   [] LDA for New Pressure Injury Added      Attending RN:  Jen Espinoza RN     Second RN:  SORAIDA De Leon

## 2024-07-02 NOTE — ED PROVIDER NOTES
"Encounter Date: 7/2/2024       History     Chief Complaint   Patient presents with    Ankle Injury     Right ankle pain after "folding it back under my leg."  While cutting grass 2 days ago.      Patient is a 48-year-old man presenting to the emergency today for evaluation of right ankle pain after twisting his ankle while cutting his grass 2 days ago.  He has not taken anything for his pain.  He states it hurts to walk on the ankle.  Patient denies any other symptoms or concerns at this time.      Review of patient's allergies indicates:   Allergen Reactions    Penicillins Anaphylaxis     Past Medical History:   Diagnosis Date    Diabetes mellitus     High cholesterol     Hypertension      Past Surgical History:   Procedure Laterality Date    CHOLECYSTECTOMY       Family History   Problem Relation Name Age of Onset    Hypertension Mother      Diabetes Mother      Hypertension Father      Hypertension Maternal Grandfather      Diabetes Maternal Grandfather      Miscarriages / Stillbirths Paternal Grandmother      Diabetes Paternal Grandfather      Hypertension Paternal Grandfather       Social History     Tobacco Use    Smoking status: Every Day     Current packs/day: 1.00     Average packs/day: 1 pack/day for 30.0 years (30.0 ttl pk-yrs)     Types: Cigarettes     Start date: 7/12/1994    Smokeless tobacco: Never   Substance Use Topics    Alcohol use: Yes     Alcohol/week: 3.0 - 4.0 standard drinks of alcohol     Types: 3 - 4 Cans of beer per week     Comment: Daily    Drug use: Yes     Frequency: 5.0 times per week     Types: Marijuana     Review of Systems   Constitutional:  Negative for fatigue and fever.   HENT:  Negative for facial swelling.    Eyes:  Negative for pain.   Respiratory:  Negative for shortness of breath.    Cardiovascular:  Negative for chest pain.   Gastrointestinal:  Negative for abdominal pain, nausea and vomiting.   Genitourinary:  Negative for dysuria.   Musculoskeletal:  Positive for " arthralgias (Right ankle) and joint swelling (Right ankle). Negative for back pain.   Skin:  Positive for wound (Right foot plantar). Negative for color change.   Neurological:  Negative for headaches.   Hematological:  Does not bruise/bleed easily.   Psychiatric/Behavioral:  Negative for behavioral problems.        Physical Exam     Initial Vitals [07/02/24 0610]   BP Pulse Resp Temp SpO2   (!) 143/79 (!) 123 20 98.5 °F (36.9 °C) 96 %      MAP       --         Physical Exam    Nursing note and vitals reviewed.  Constitutional: He appears well-developed and well-nourished.   HENT:   Head: Normocephalic.   Eyes: Conjunctivae and EOM are normal. Pupils are equal, round, and reactive to light.   Neck: Neck supple.   Cardiovascular:  Regular rhythm and normal heart sounds.   Tachycardia present.         Pulmonary/Chest: Breath sounds normal. No respiratory distress. He has no wheezes.   Abdominal: He exhibits no distension.   Musculoskeletal:         General: Tenderness present.      Cervical back: Neck supple.      Right ankle: Swelling present. Tenderness present over the lateral malleolus. Decreased range of motion.      Right foot: Swelling and deformity present.        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  There is drainage and skin overlying is mildly red and crusting.     Neurological: He is alert.   Skin: Skin is warm and dry.   Psychiatric: He has a normal mood and affect.         ED Course   Critical Care    Date/Time: 7/2/2024 1:22 PM    Performed by: Adia Doran MD  Authorized by: Adia Doran MD  Direct patient critical care time: 30 minutes  Additional history critical care time: 10 minutes  Ordering / reviewing critical care time: 10 minutes  Documentation critical care time: 5 minutes  Consulting other physicians critical care time: 5 minutes  Consult with family critical care time: 7 minutes  Total critical care time (exclusive of procedural time) : 67  minutes  Critical care time was exclusive of separately billable procedures and treating other patients and teaching time.  Critical care was necessary to treat or prevent imminent or life-threatening deterioration of the following conditions: metabolic crisis.  Critical care was time spent personally by me on the following activities: blood draw for specimens, development of treatment plan with patient or surrogate, evaluation of patient's response to treatment, examination of patient, obtaining history from patient or surrogate, ordering and performing treatments and interventions, ordering and review of laboratory studies, ordering and review of radiographic studies, pulse oximetry, re-evaluation of patient's condition and review of old charts.        Labs Reviewed   POCT URINALYSIS W/O SCOPE - Abnormal; Notable for the following components:       Result Value    Glucose, UA 2+ (*)     Blood, UA 1+ (*)     Protein, UA 2+ (*)     All other components within normal limits   POCT GLUCOSE - Abnormal; Notable for the following components:    POCT Glucose >500 (*)     All other components within normal limits   ISTAT PROCEDURE - Abnormal; Notable for the following components:    POC PCO2 53.7 (*)     POC HCO3 31.2 (*)     POC BE 5 (*)     POC TCO2 33 (*)     All other components within normal limits   POCT CMP - Abnormal; Notable for the following components:    ALT (SGPT), POC 7 (*)     POC Chloride 90 (*)     POC Glucose 564 (*)     Protein, POC 9.0 (*)     All other components within normal limits   POCT GLUCOSE - Abnormal; Notable for the following components:    POCT Glucose >500 (*)     All other components within normal limits   POCT GLUCOSE - Abnormal; Notable for the following components:    POCT Glucose 432 (*)     All other components within normal limits   POCT GLUCOSE - Abnormal; Notable for the following components:    POCT Glucose 478 (*)     All other components within normal limits   POCT GLUCOSE -  Abnormal; Notable for the following components:    POCT Glucose 291 (*)     All other components within normal limits   CULTURE, BLOOD   CULTURE, BLOOD   CULTURE, AEROBIC  (SPECIFY SOURCE)   TROPONIN ISTAT   POCT CBC   POCT URINALYSIS W/O SCOPE   POCT GLUCOSE, HAND-HELD DEVICE   POCT GLUCOSE, HAND-HELD DEVICE   POCT CMP   POCT TROPONIN     EKG Readings: (Independently Interpreted)   Initial Reading: No STEMI. Rhythm: Normal Sinus Rhythm. Heart Rate: 91. Ectopy: No Ectopy.       Imaging Results               X-Ray Ankle Complete Right (Final result)  Result time 07/02/24 08:13:44      Final result by Fritz Gudino MD (07/02/24 08:13:44)                   Impression:      osteomyelitis of the foot This report was flagged in Epic as abnormal.  The time is it    COMMUNICATION  This critical result was discovered/received at 810.  The critical information above was relayed directly by me by telephone to Dr.Erin Rubio on 07/02/2024 at 08:12      Electronically signed by: Fritz Gudino MD  Date:    07/02/2024  Time:    08:13               Narrative:    EXAMINATION:  XR ANKLE COMPLETE 3 VIEW RIGHT    CLINICAL HISTORY:  Pain in right ankle and joints of right foot    TECHNIQUE:  AP, lateral, and oblique images of the right ankle were performed.    COMPARISON:  None    FINDINGS:  Radiographs of the ankle show the ankle mortise to be intact.  There is some irregularity of the distal tibia in the region of the medial malleolus that may represent some type of avulsion fracture.    There is soft tissue swelling bony destruction and gas seen in the portions of the foot along the 1st metatarsal that are visualized.  Possible osteomyelitis with sepsis is suggested.                                       Medications   ketorolac injection 15 mg (15 mg Intramuscular Given 7/2/24 0633)   oxyCODONE-acetaminophen 5-325 mg per tablet 1 tablet (1 tablet Oral Given 7/2/24 0633)   ciprofloxacin (CIPRO)400mg/200ml D5W IVPB 400 mg (0  mg Intravenous Stopped 7/2/24 1025)   sodium chloride 0.9% bolus 1,000 mL 1,000 mL (0 mLs Intravenous Stopped 7/2/24 1025)   vancomycin (VANCOCIN) 2,000 mg in D5W 500 mL IVPB (0 mg Intravenous Stopped 7/2/24 1231)   insulin regular injection 9 Units 0.09 mL (9 Units Intravenous Given 7/2/24 0935)   sodium chloride 0.9% bolus 1,000 mL 1,000 mL (0 mLs Intravenous Stopped 7/2/24 1145)   insulin regular injection 6 Units 0.06 mL (6 Units Intravenous Given 7/2/24 1021)   insulin regular injection 10 Units 0.1 mL (10 Units Intravenous Given 7/2/24 1227)     Medical Decision Making  This is an urgent evaluation of a 48-year-old man presenting to the emergency department today for ankle pain.  Differential diagnoses include musculoskeletal strain, sprain, fracture, dislocation.  On physical examination, patient had swelling and tenderness to the right lateral malleolus as well as the dorsal ankle.  He would decreased range of motion secondary to pain.  Of note, patient additionally had what appears to be a diabetic foot wound.  He stated he has been cleaning this and wrapping this on his own and does not wish to have this addressed today.  His blood glucose level was checked and found to be greater than 500.  He stated he drank grape juice just prior to arrival.  I advised him that I felt that the wound does not look very good at this time and I would like to obtain labs and imaging of this.  Patient states he is only here for his ankle and does not wish to have the foot wound or blood glucose levels addressed.  I advised him I will obtain an ankle x-ray at this time however we will discuss the blood glucose and foot wound findings once this returns.  Patient was amenable to this plan.  I have ordered pain medication and an x-ray at this time.    Adia Doran MD  6:32 AM  7/2/2024    There was a radiology delay in the interpretation of patient's x-ray.  I discussed this with the radiologist on-call who evaluated the x-ray  and advised there is some apparent irregularity of the distal tibia in the region of the medial malleolus which may represent some type of avulsion fracture however there is additional significant soft tissue swelling and bony destruction as well as gas seen in the portions of the foot along the 1st metatarsal.  This is as expected given patient's physical exam findings.  I had a long discussion with both the patient and his daughter and advised my grave concerns that patient has a significant infection in his foot which may cause him to need further amputations but also could lead to sepsis and death.  Patient initially was reticent to stay however after continued discussions has agreed to admission.  I have therefore ordered additional labs, vancomycin and ciprofloxacin, avoiding penicillins and cephalosporins as patient states he has anaphylaxis to penicillin.  Patient will be admitted to [facility] once these results return.     Adia Doran MD  8:25 AM  7/2/2024    Patient's labs returned.  He is not in DKA.  His blood glucose however remained elevated despite his initial treatment with insulin and IV fluids.  It is unclear if this is secondary to the grape juice that he drank just prior to arrival.  He was treated with an additional dose of IV insulin and IV fluids with subsequent decrease in his blood glucose to 432.  I do not believe he necessitates an ICU admission at this time.  I have consulted Internal Medicine for admission to the [facility].    Adia Doran MD  11:34 AM  7/2/2024    Patient was given an additional 10 units of IV insulin in the emergency department which brought his blood glucose level down to 291.  Will be transferred to the [facility] for admission at this time.    Adia Doran MD  1:21 PM  7/2/2024                  Amount and/or Complexity of Data Reviewed  Labs: ordered.  Radiology: ordered.    Risk  OTC drugs.  Prescription drug management.  Decision regarding  hospitalization.                                      Clinical Impression:  Final diagnoses:  [M25.571] Ankle pain, right  [R00.0] Tachycardia  [M86.171] Other acute osteomyelitis of right foot (Primary)          ED Disposition Condition    Admit Stable                Adia Doran MD  07/02/24 1322       Adia Doran MD  07/02/24 132

## 2024-07-02 NOTE — PROGRESS NOTES
"Pharmacokinetic Initial Assessment: IV Vancomycin    Assessment/Plan:    Initiate intravenous vancomycin with loading dose of 2000 mg once followed by a maintenance dose of vancomycin 1250 mg IV every 12 hours  Desired empiric serum trough concentration is 10 to 20 mcg/mL  Draw vancomycin trough level 60 min prior to fourth dose on 7/3/24 at approximately 2100  Pharmacy will continue to follow and monitor vancomycin.      Please contact pharmacy at extension [phone] with any questions regarding this assessment.     Thank you for the consult,   Randa Looney       Patient brief summary:  Hany Guerin is a 48 y.o. male initiated on antimicrobial therapy with IV Vancomycin for treatment of suspected skin & soft tissue infection    Drug Allergies:   Review of patient's allergies indicates:   Allergen Reactions    Penicillins Anaphylaxis       Actual Body Weight:   89.2 kg     Renal Function:   Estimated Creatinine Clearance: 95.6 mL/min (based on SCr of 1 mg/dL).,     Dialysis Method (if applicable):  N/A    CBC (last 72 hours):  Recent Labs   Lab Result Units 07/02/24  1603   WBC K/uL 9.10   Hemoglobin g/dL 9.4*   Hematocrit % 29.9*   Platelets K/uL 277   Gran % % 70.7   Lymph % % 15.4*   Mono % % 10.7   Eosinophil % % 1.6   Basophil % % 0.3   Differential Method  Automated       Metabolic Panel (last 72 hours):  Recent Labs   Lab Result Units 07/02/24  1602   Sodium mmol/L 133*   Potassium mmol/L 4.9   Chloride mmol/L 99   CO2 mmol/L 28   Glucose mg/dL 192*   BUN mg/dL 13   Creatinine mg/dL 1.0   Albumin g/dL 1.9*   Total Bilirubin mg/dL 0.5   Alkaline Phosphatase U/L 162*   AST U/L 15   ALT U/L 11   Magnesium mg/dL 1.8   Phosphorus mg/dL 3.5       Drug levels (last 3 results):  No results for input(s): "VANCOMYCINRA", "VANCORANDOM", "VANCOMYCINPE", "VANCOPEAK", "VANCOMYCINTR", "VANCOTROUGH" in the last 72 hours.    Microbiologic Results:  Microbiology Results (last 7 days)       Procedure Component Value " Units Date/Time    Blood culture [[id number]] Collected: 07/02/24 1610    Order Status: Sent Specimen: Blood from Peripheral, Antecubital, Right Updated: 07/02/24 1612    Blood culture [[id number]] Collected: 07/02/24 1603    Order Status: Sent Specimen: Blood from Peripheral, Antecubital, Left Updated: 07/02/24 1610    Aerobic culture (Specify Source) **CANNOT BE ORDERED AS STAT** [[id number]] Collected: 07/02/24 0844    Order Status: Sent Specimen: Wound from Leg, Right Updated: 07/02/24 1306    Blood culture #2 **CANNOT BE ORDERED STAT** [[id number]] Collected: 07/02/24 0855    Order Status: Sent Specimen: Blood from Peripheral, Antecubital, Left Updated: 07/02/24 1254    Blood culture #1 **CANNOT BE ORDERED STAT** [[id number]] Collected: 07/02/24 0855    Order Status: Sent Specimen: Blood from Peripheral, Antecubital, Right Updated: 07/02/24 1254    Culture, Body Fluid (Aerobic) w/ GS [[id number]]     Order Status: Canceled Specimen: Body Fluid

## 2024-07-02 NOTE — PLAN OF CARE
Attempted to see patient- BUDDY in MRI. Discussed with patient's nurse plan to see patient early AM tomorrow. NPO @ midnight

## 2024-07-02 NOTE — ASSESSMENT & PLAN NOTE
Xray: soft tissue swelling bony destruction and gas seen in the portions of the foot along the 1st metatarsal.   He has had 1st R toe amp previously  LRINEC score 9  Vanc+Tomi  ID consult  Podiatry consult  MRI forefoot and midfoot  Control DM better

## 2024-07-02 NOTE — SUBJECTIVE & OBJECTIVE
Past Medical History:   Diagnosis Date    Diabetes mellitus     High cholesterol     Hypertension        Past Surgical History:   Procedure Laterality Date    CHOLECYSTECTOMY         Review of patient's allergies indicates:   Allergen Reactions    Penicillins Anaphylaxis       No current facility-administered medications on file prior to encounter.     Current Outpatient Medications on File Prior to Encounter   Medication Sig    amlodipine-olmesartan (KENZIE) 10-20 mg per tablet Take 1 tablet by mouth once daily.    blood-glucose meter,continuous (DEXCOM G6 ) Misc Use as needed to monitor blood glucose    blood-glucose sensor (DEXCOM G6 SENSOR) Alicja Use as needed to monitor blood glucose    blood-glucose transmitter (DEXCOM G6 TRANSMITTER) Alicja Use as needed to monitor blood glucose    flash glucose sensor (FREESTYLE COTY 2 SENSOR) Kit 1 Device by Misc.(Non-Drug; Combo Route) route every 14 (fourteen) days.    FREESTYLE COTY 14 DAY READER Misc Use with sensor as directed    gabapentin (NEURONTIN) 100 MG capsule Take 1 capsule (100 mg total) by mouth 3 (three) times daily.    hydroCHLOROthiazide (MICROZIDE) 12.5 mg capsule Take 1 capsule (12.5 mg total) by mouth every morning.    metronidazole 1% (METROGEL) 1 % Gel Apply topically every other day.    [DISCONTINUED] doxycycline (VIBRAMYCIN) 100 MG Cap Take 1 capsule (100 mg total) by mouth 2 (two) times daily.     Family History       Problem Relation (Age of Onset)    Diabetes Mother, Maternal Grandfather, Paternal Grandfather    Hypertension Mother, Father, Maternal Grandfather, Paternal Grandfather    Miscarriages / Stillbirths Paternal Grandmother          Tobacco Use    Smoking status: Every Day     Current packs/day: 1.00     Average packs/day: 1 pack/day for 30.0 years (30.0 ttl pk-yrs)     Types: Cigarettes     Start date: 7/12/1994    Smokeless tobacco: Never   Substance and Sexual Activity    Alcohol use: Yes     Alcohol/week: 3.0 - 4.0 standard  drinks of alcohol     Types: 3 - 4 Cans of beer per week     Comment: Daily    Drug use: Yes     Frequency: 5.0 times per week     Types: Marijuana    Sexual activity: Yes     Partners: Female     Review of Systems   Constitutional:  Negative for activity change, fever and unexpected weight change.   HENT:  Negative for trouble swallowing and voice change.    Eyes:  Negative for photophobia and visual disturbance.   Respiratory:  Negative for cough and shortness of breath.    Cardiovascular:  Negative for chest pain, palpitations and leg swelling.   Gastrointestinal:  Negative for abdominal distention, abdominal pain, blood in stool, constipation, diarrhea, nausea and vomiting.   Endocrine: Negative for polydipsia and polyuria.   Genitourinary:  Negative for difficulty urinating, dysuria and hematuria.   Musculoskeletal:  Positive for arthralgias, gait problem and joint swelling.   Skin:  Positive for color change and wound. Negative for rash.   Allergic/Immunologic: Negative for immunocompromised state.   Neurological:  Negative for seizures, syncope, facial asymmetry and weakness.   Psychiatric/Behavioral:  Negative for agitation, behavioral problems and confusion.      Objective:     Vital Signs (Most Recent):  Temp: 99.4 °F (37.4 °C) (07/02/24 1600)  Pulse: 94 (07/02/24 1600)  Resp: 18 (07/02/24 1600)  BP: 139/82 (07/02/24 1600)  SpO2: 99 % (07/02/24 1600) Vital Signs (24h Range):  Temp:  [98.5 °F (36.9 °C)-99.4 °F (37.4 °C)] 99.4 °F (37.4 °C)  Pulse:  [] 94  Resp:  [17-20] 18  SpO2:  [95 %-99 %] 99 %  BP: (128-157)/(62-82) 139/82     Weight: 89.2 kg (196 lb 10.4 oz)  Body mass index is 31.74 kg/m².     Physical Exam  Vitals and nursing note reviewed.   Constitutional:       General: He is not in acute distress.     Appearance: He is well-developed. He is obese. He is not ill-appearing or diaphoretic.   HENT:      Head: Normocephalic and atraumatic.   Eyes:      General: No scleral icterus.     Pupils:  Pupils are equal, round, and reactive to light.   Neck:      Thyroid: No thyromegaly.   Cardiovascular:      Rate and Rhythm: Normal rate and regular rhythm.      Heart sounds: No murmur heard.  Pulmonary:      Effort: Pulmonary effort is normal.      Breath sounds: Normal breath sounds. No stridor. No wheezing or rales.   Abdominal:      General: There is no distension.      Palpations: Abdomen is soft.      Tenderness: There is no guarding.   Musculoskeletal:         General: Swelling and deformity present. Normal range of motion.      Cervical back: Normal range of motion.      Right lower leg: Edema present.      Left lower leg: No edema.   Skin:     General: Skin is warm.      Capillary Refill: Capillary refill takes less than 2 seconds.      Findings: Lesion present.   Neurological:      Mental Status: He is alert and oriented to person, place, and time.      Cranial Nerves: No cranial nerve deficit.      Motor: No weakness.   Psychiatric:         Mood and Affect: Mood normal.         Behavior: Behavior normal.         Thought Content: Thought content normal.         Judgment: Judgment normal.              CRANIAL NERVES     CN III, IV, VI   Pupils are equal, round, and reactive to light.           Recent Results (from the past 24 hour(s))   POCT glucose    Collection Time: 07/02/24  6:20 AM   Result Value Ref Range    POCT Glucose >500 (HH) 70 - 110 mg/dL   POCT CMP    Collection Time: 07/02/24  8:48 AM   Result Value Ref Range    Albumin, POC 2.3 3.3 - 5.5 g/dL    Alkaline Phosphatase,  42 - 141 U/L    ALT (SGPT), POC 7 (L) 10 - 47 U/L    AST (SGOT), POC 13 11 - 38 U/L    POC BUN 13 7 - 22 mg/dL    Calcium, POC 9.6 8.0 - 10.3 mg/dL    POC Chloride 90 (L) 98 - 108 mmol/L    POC Creatinine 0.9 0.6 - 1.2 mg/dL    POC Glucose 564 (HH) 73 - 118 mg/dL    POC Potassium 5.0 3.6 - 5.1 mmol/L    POC Sodium 130 128 - 145 mmol/L    Bilirubin, POC 0.5 0.2 - 1.6 mg/dL    POC TCO2 31 18 - 33 mmol/L    Protein, POC  9.0 (H) 6.4 - 8.1 g/dL   POCT CBC    Collection Time: 07/02/24  8:51 AM   Result Value Ref Range    Hematocrit      Hemoglobin      RBC      WBC      MCV      MCH, POC      MCHC      RDW-CV      Platelet Count, POC      MPV     ISTAT PROCEDURE    Collection Time: 07/02/24  8:55 AM   Result Value Ref Range    POC PH 7.372 7.35 - 7.45    POC PCO2 53.7 (H) 35 - 45 mmHg    POC PO2 44 40 - 60 mmHg    POC HCO3 31.2 (H) 24 - 28 mmol/L    POC BE 5 (H) -2 to 2 mmol/L    POC SATURATED O2 77 95 - 100 %    POC Lactate 1.09 0.5 - 2.2 mmol/L    POC TCO2 33 (H) 24 - 29 mmol/L    Sample VENOUS     Site Other     Allens Test N/A    EKG 12-lead    Collection Time: 07/02/24  8:57 AM   Result Value Ref Range    QRS Duration 92 ms    [facility] QTC Calculation 425 ms   POCT glucose    Collection Time: 07/02/24 10:02 AM   Result Value Ref Range    POCT Glucose >500 (HH) 70 - 110 mg/dL   Troponin ISTAT    Collection Time: 07/02/24 10:42 AM   Result Value Ref Range    POC Cardiac Troponin I 0.00 0.00 - 0.08 ng/mL    Sample unknown    POCT glucose    Collection Time: 07/02/24 11:02 AM   Result Value Ref Range    POCT Glucose 432 (H) 70 - 110 mg/dL   POCT URINALYSIS W/O SCOPE    Collection Time: 07/02/24 11:50 AM   Result Value Ref Range    Glucose, UA 2+ (A)     Bilirubin, UA Negative     Ketones, UA Negative     Spec Grav UA 1.015     Blood, UA 1+ (A)     PH, UA 5.5     Protein, UA 2+ (A)     Urobilinogen, UA 0.2 E.U./dL    Nitrite, UA Negative     Leukocytes, UA Negative     Color, UA Yellow     Clarity, UA Clear    POCT glucose    Collection Time: 07/02/24 12:22 PM   Result Value Ref Range    POCT Glucose 478 (HH) 70 - 110 mg/dL   POCT glucose    Collection Time: 07/02/24  1:16 PM   Result Value Ref Range    POCT Glucose 291 (H) 70 - 110 mg/dL   CBC Auto Differential    Collection Time: 07/02/24  4:03 PM   Result Value Ref Range    WBC 9.10 3.90 - 12.70 K/uL    RBC 3.53 (L) 4.60 - 6.20 M/uL    Hemoglobin 9.4 (L) 14.0 - 18.0 g/dL    Hematocrit  29.9 (L) 40.0 - 54.0 %    MCV 85 82 - 98 fL    MCH 26.6 (L) 27.0 - 31.0 pg    MCHC 31.4 (L) 32.0 - 36.0 g/dL    RDW 13.2 11.5 - 14.5 %    Platelets 277 150 - 450 K/uL    MPV 10.4 9.2 - 12.9 fL    Immature Granulocytes 1.3 (H) 0.0 - 0.5 %    Gran # (ANC) 6.4 1.8 - 7.7 K/uL    Immature Grans (Abs) 0.12 (H) 0.00 - 0.04 K/uL    Lymph # 1.4 1.0 - 4.8 K/uL    Mono # 1.0 0.3 - 1.0 K/uL    Eos # 0.2 0.0 - 0.5 K/uL    Baso # 0.03 0.00 - 0.20 K/uL    nRBC 0 0 /100 WBC    Gran % 70.7 38.0 - 73.0 %    Lymph % 15.4 (L) 18.0 - 48.0 %    Mono % 10.7 4.0 - 15.0 %    Eosinophil % 1.6 0.0 - 8.0 %    Basophil % 0.3 0.0 - 1.9 %    Differential Method Automated        Microbiology Results (last 7 days)       Procedure Component Value Units Date/Time    Blood culture [[id number]] Collected: 07/02/24 1610    Order Status: Sent Specimen: Blood from Peripheral, Antecubital, Right Updated: 07/02/24 1612    Blood culture [[id number]] Collected: 07/02/24 1603    Order Status: Sent Specimen: Blood from Peripheral, Antecubital, Left Updated: 07/02/24 1610    Aerobic culture (Specify Source) **CANNOT BE ORDERED AS STAT** [[id number]] Collected: 07/02/24 0844    Order Status: Sent Specimen: Wound from Leg, Right Updated: 07/02/24 1306    Blood culture #2 **CANNOT BE ORDERED STAT** [[id number]] Collected: 07/02/24 0855    Order Status: Sent Specimen: Blood from Peripheral, Antecubital, Left Updated: 07/02/24 1254    Blood culture #1 **CANNOT BE ORDERED STAT** [[id number]] Collected: 07/02/24 0855    Order Status: Sent Specimen: Blood from Peripheral, Antecubital, Right Updated: 07/02/24 1254    Culture, Body Fluid (Aerobic) w/ GS [[id number]]     Order Status: Canceled Specimen: Body Fluid              Imaging Results               X-Ray Ankle Complete Right (Final result)  Result time 07/02/24 08:13:44      Final result by Fritz Gudino MD (07/02/24 08:13:44)                   Impression:      osteomyelitis of the foot This report was  flagged in Epic as abnormal.  The time is it    COMMUNICATION  This critical result was discovered/received at 810.  The critical information above was relayed directly by me by telephone to Dr.Erin Rubio on 07/02/2024 at 08:12      Electronically signed by: Fritz Gudino MD  Date:    07/02/2024  Time:    08:13               Narrative:    EXAMINATION:  XR ANKLE COMPLETE 3 VIEW RIGHT    CLINICAL HISTORY:  Pain in right ankle and joints of right foot    TECHNIQUE:  AP, lateral, and oblique images of the right ankle were performed.    COMPARISON:  None    FINDINGS:  Radiographs of the ankle show the ankle mortise to be intact.  There is some irregularity of the distal tibia in the region of the medial malleolus that may represent some type of avulsion fracture.    There is soft tissue swelling bony destruction and gas seen in the portions of the foot along the 1st metatarsal that are visualized.  Possible osteomyelitis with sepsis is suggested.

## 2024-07-02 NOTE — ASSESSMENT & PLAN NOTE
Xray: soft tissue swelling bony destruction and gas seen in the portions of the foot along the 1st metatarsal.   He has had 1st R toe amp previously  Sodium 130. Hb dropped to 15 to 9.4, SED rate 119 & CRP elevated  WBC and Cr in normal limits however  LRINEC score 9, high risk for necrotizing soft tissue infx (>93%)  Vanc + Tomi  ID consult  Podiatry consult  MRI forefoot and midfoot  Control DM better

## 2024-07-02 NOTE — HPI
Hany Guerin is a 48 y.o. male who has a past medical history of Diabetes mellitus, High cholesterol, and Hypertension, presented to the ED with CC of Ankle Pain after fall.       No fracture or significant pathology was discovered of his ankle other than potential avascular necrosis of distal tibia; however, Xray did show soft tissue swelling bony destruction and gas seen in the portions of the foot along the 1st metatarsal. Patient has had long langford with osteomyelitis in the past of the right foot. He has had 1st R toe amp previously. Does still have drainage from foot, constantly. He has not been to wound care in over a year. Has not been on antibiotics for 2 years. And is not taking any insulin and his glucose is >500 on admission. He is not in DKA; no ketones in urine and pH wnl. Sodium 130. Hb dropped to 15 to 9.4, SED rate 119 & CRP elevated, WBC and Cr in normal limits however (LRINEC score 9). Podiatry and ID consulted. PEN allergy, anaphylaxis. Started on Vanc+Tomi.

## 2024-07-03 ENCOUNTER — ANESTHESIA (OUTPATIENT)
Dept: SURGERY | Facility: HOSPITAL | Age: 49
End: 2024-07-03
Payer: MEDICAID

## 2024-07-03 ENCOUNTER — ANESTHESIA EVENT (OUTPATIENT)
Dept: SURGERY | Facility: HOSPITAL | Age: 49
End: 2024-07-03
Payer: MEDICAID

## 2024-07-03 LAB
ALBUMIN SERPL BCP-MCNC: 1.6 G/DL (ref 3.5–5.2)
ALP SERPL-CCNC: 157 U/L (ref 55–135)
ALT SERPL W/O P-5'-P-CCNC: 9 U/L (ref 10–44)
ANION GAP SERPL CALC-SCNC: 7 MMOL/L (ref 8–16)
AST SERPL-CCNC: 10 U/L (ref 10–40)
BASOPHILS # BLD AUTO: 0.05 K/UL (ref 0–0.2)
BASOPHILS NFR BLD: 0.6 % (ref 0–1.9)
BILIRUB SERPL-MCNC: 0.2 MG/DL (ref 0.1–1)
BUN SERPL-MCNC: 17 MG/DL (ref 6–20)
CALCIUM SERPL-MCNC: 9.1 MG/DL (ref 8.7–10.5)
CHLORIDE SERPL-SCNC: 99 MMOL/L (ref 95–110)
CO2 SERPL-SCNC: 26 MMOL/L (ref 23–29)
CREAT SERPL-MCNC: 1 MG/DL (ref 0.5–1.4)
DIFFERENTIAL METHOD BLD: ABNORMAL
EOSINOPHIL # BLD AUTO: 0.2 K/UL (ref 0–0.5)
EOSINOPHIL NFR BLD: 2.5 % (ref 0–8)
ERYTHROCYTE [DISTWIDTH] IN BLOOD BY AUTOMATED COUNT: 13.2 % (ref 11.5–14.5)
EST. GFR  (NO RACE VARIABLE): >60 ML/MIN/1.73 M^2
ESTIMATED AVG GLUCOSE: ABNORMAL MG/DL (ref 68–131)
GLUCOSE SERPL-MCNC: 203 MG/DL (ref 70–110)
GRAM STN SPEC: NORMAL
HBA1C MFR BLD: >14 % (ref 4–5.6)
HCT VFR BLD AUTO: 29.9 % (ref 40–54)
HGB BLD-MCNC: 9.1 G/DL (ref 14–18)
IMM GRANULOCYTES # BLD AUTO: 0.12 K/UL (ref 0–0.04)
IMM GRANULOCYTES NFR BLD AUTO: 1.4 % (ref 0–0.5)
LYMPHOCYTES # BLD AUTO: 1.7 K/UL (ref 1–4.8)
LYMPHOCYTES NFR BLD: 19.3 % (ref 18–48)
MAGNESIUM SERPL-MCNC: 1.8 MG/DL (ref 1.6–2.6)
MCH RBC QN AUTO: 26.6 PG (ref 27–31)
MCHC RBC AUTO-ENTMCNC: 30.4 G/DL (ref 32–36)
MCV RBC AUTO: 87 FL (ref 82–98)
MONOCYTES # BLD AUTO: 1 K/UL (ref 0.3–1)
MONOCYTES NFR BLD: 11 % (ref 4–15)
NEUTROPHILS # BLD AUTO: 5.7 K/UL (ref 1.8–7.7)
NEUTROPHILS NFR BLD: 65.2 % (ref 38–73)
NRBC BLD-RTO: 0 /100 WBC
PHOSPHATE SERPL-MCNC: 3.3 MG/DL (ref 2.7–4.5)
PLATELET # BLD AUTO: 278 K/UL (ref 150–450)
PMV BLD AUTO: 10.8 FL (ref 9.2–12.9)
POCT GLUCOSE: 211 MG/DL (ref 70–110)
POCT GLUCOSE: 222 MG/DL (ref 70–110)
POCT GLUCOSE: 223 MG/DL (ref 70–110)
POCT GLUCOSE: 235 MG/DL (ref 70–110)
POCT GLUCOSE: 272 MG/DL (ref 70–110)
POCT GLUCOSE: 282 MG/DL (ref 70–110)
POTASSIUM SERPL-SCNC: 4 MMOL/L (ref 3.5–5.1)
PROT SERPL-MCNC: 7.4 G/DL (ref 6–8.4)
RBC # BLD AUTO: 3.42 M/UL (ref 4.6–6.2)
SODIUM SERPL-SCNC: 132 MMOL/L (ref 136–145)
VANCOMYCIN TROUGH SERPL-MCNC: 15.9 UG/ML (ref 10–22)
WBC # BLD AUTO: 8.75 K/UL (ref 3.9–12.7)

## 2024-07-03 PROCEDURE — 36000707: Performed by: PODIATRIST

## 2024-07-03 PROCEDURE — 88307 TISSUE EXAM BY PATHOLOGIST: CPT | Performed by: PATHOLOGY

## 2024-07-03 PROCEDURE — 27201423 OPTIME MED/SURG SUP & DEVICES STERILE SUPPLY: Performed by: PODIATRIST

## 2024-07-03 PROCEDURE — 87147 CULTURE TYPE IMMUNOLOGIC: CPT | Mod: 59 | Performed by: STUDENT IN AN ORGANIZED HEALTH CARE EDUCATION/TRAINING PROGRAM

## 2024-07-03 PROCEDURE — 87075 CULTR BACTERIA EXCEPT BLOOD: CPT | Mod: 59 | Performed by: STUDENT IN AN ORGANIZED HEALTH CARE EDUCATION/TRAINING PROGRAM

## 2024-07-03 PROCEDURE — 25000003 PHARM REV CODE 250: Performed by: PODIATRIST

## 2024-07-03 PROCEDURE — 37000008 HC ANESTHESIA 1ST 15 MINUTES: Performed by: PODIATRIST

## 2024-07-03 PROCEDURE — 87206 SMEAR FLUORESCENT/ACID STAI: CPT | Performed by: STUDENT IN AN ORGANIZED HEALTH CARE EDUCATION/TRAINING PROGRAM

## 2024-07-03 PROCEDURE — 37000009 HC ANESTHESIA EA ADD 15 MINS: Performed by: PODIATRIST

## 2024-07-03 PROCEDURE — 87205 SMEAR GRAM STAIN: CPT | Performed by: PODIATRIST

## 2024-07-03 PROCEDURE — 63600175 PHARM REV CODE 636 W HCPCS: Mod: JZ,JG | Performed by: PODIATRIST

## 2024-07-03 PROCEDURE — 87147 CULTURE TYPE IMMUNOLOGIC: CPT | Performed by: PODIATRIST

## 2024-07-03 PROCEDURE — 83735 ASSAY OF MAGNESIUM: CPT | Performed by: STUDENT IN AN ORGANIZED HEALTH CARE EDUCATION/TRAINING PROGRAM

## 2024-07-03 PROCEDURE — 87070 CULTURE OTHR SPECIMN AEROBIC: CPT | Performed by: PODIATRIST

## 2024-07-03 PROCEDURE — 63600175 PHARM REV CODE 636 W HCPCS: Performed by: STUDENT IN AN ORGANIZED HEALTH CARE EDUCATION/TRAINING PROGRAM

## 2024-07-03 PROCEDURE — 85025 COMPLETE CBC W/AUTO DIFF WBC: CPT | Performed by: STUDENT IN AN ORGANIZED HEALTH CARE EDUCATION/TRAINING PROGRAM

## 2024-07-03 PROCEDURE — 36415 COLL VENOUS BLD VENIPUNCTURE: CPT | Performed by: STUDENT IN AN ORGANIZED HEALTH CARE EDUCATION/TRAINING PROGRAM

## 2024-07-03 PROCEDURE — 87075 CULTR BACTERIA EXCEPT BLOOD: CPT | Performed by: PODIATRIST

## 2024-07-03 PROCEDURE — 88311 DECALCIFY TISSUE: CPT | Mod: 26,,, | Performed by: PATHOLOGY

## 2024-07-03 PROCEDURE — 99223 1ST HOSP IP/OBS HIGH 75: CPT | Mod: 25,,, | Performed by: PODIATRIST

## 2024-07-03 PROCEDURE — 11044 DBRDMT BONE 1ST 20 SQ CM/<: CPT | Mod: ,,, | Performed by: PODIATRIST

## 2024-07-03 PROCEDURE — 80053 COMPREHEN METABOLIC PANEL: CPT | Performed by: STUDENT IN AN ORGANIZED HEALTH CARE EDUCATION/TRAINING PROGRAM

## 2024-07-03 PROCEDURE — 87102 FUNGUS ISOLATION CULTURE: CPT | Performed by: STUDENT IN AN ORGANIZED HEALTH CARE EDUCATION/TRAINING PROGRAM

## 2024-07-03 PROCEDURE — 25000003 PHARM REV CODE 250: Performed by: STUDENT IN AN ORGANIZED HEALTH CARE EDUCATION/TRAINING PROGRAM

## 2024-07-03 PROCEDURE — 0JBQ0ZZ EXCISION OF RIGHT FOOT SUBCUTANEOUS TISSUE AND FASCIA, OPEN APPROACH: ICD-10-PCS | Performed by: HOSPITALIST

## 2024-07-03 PROCEDURE — 84100 ASSAY OF PHOSPHORUS: CPT | Performed by: STUDENT IN AN ORGANIZED HEALTH CARE EDUCATION/TRAINING PROGRAM

## 2024-07-03 PROCEDURE — 87116 MYCOBACTERIA CULTURE: CPT | Performed by: STUDENT IN AN ORGANIZED HEALTH CARE EDUCATION/TRAINING PROGRAM

## 2024-07-03 PROCEDURE — 80202 ASSAY OF VANCOMYCIN: CPT | Performed by: STUDENT IN AN ORGANIZED HEALTH CARE EDUCATION/TRAINING PROGRAM

## 2024-07-03 PROCEDURE — 21400001 HC TELEMETRY ROOM

## 2024-07-03 PROCEDURE — 36000706: Performed by: PODIATRIST

## 2024-07-03 PROCEDURE — 99223 1ST HOSP IP/OBS HIGH 75: CPT | Mod: ,,, | Performed by: STUDENT IN AN ORGANIZED HEALTH CARE EDUCATION/TRAINING PROGRAM

## 2024-07-03 PROCEDURE — 63600175 PHARM REV CODE 636 W HCPCS

## 2024-07-03 PROCEDURE — 88307 TISSUE EXAM BY PATHOLOGIST: CPT | Mod: 26,,, | Performed by: PATHOLOGY

## 2024-07-03 PROCEDURE — 71000033 HC RECOVERY, INTIAL HOUR: Performed by: PODIATRIST

## 2024-07-03 PROCEDURE — 88311 DECALCIFY TISSUE: CPT | Performed by: PATHOLOGY

## 2024-07-03 PROCEDURE — 87070 CULTURE OTHR SPECIMN AEROBIC: CPT | Mod: 59 | Performed by: STUDENT IN AN ORGANIZED HEALTH CARE EDUCATION/TRAINING PROGRAM

## 2024-07-03 RX ORDER — ONDANSETRON HYDROCHLORIDE 2 MG/ML
INJECTION, SOLUTION INTRAVENOUS
Status: DISCONTINUED | OUTPATIENT
Start: 2024-07-03 | End: 2024-07-03

## 2024-07-03 RX ORDER — HYDROMORPHONE HYDROCHLORIDE 2 MG/ML
0.2 INJECTION, SOLUTION INTRAMUSCULAR; INTRAVENOUS; SUBCUTANEOUS EVERY 5 MIN PRN
Status: DISCONTINUED | OUTPATIENT
Start: 2024-07-03 | End: 2024-07-03 | Stop reason: HOSPADM

## 2024-07-03 RX ORDER — LORAZEPAM 2 MG/ML
0.25 INJECTION INTRAMUSCULAR ONCE AS NEEDED
Status: DISCONTINUED | OUTPATIENT
Start: 2024-07-03 | End: 2024-07-03 | Stop reason: HOSPADM

## 2024-07-03 RX ORDER — FENTANYL CITRATE 50 UG/ML
INJECTION, SOLUTION INTRAMUSCULAR; INTRAVENOUS
Status: DISCONTINUED | OUTPATIENT
Start: 2024-07-03 | End: 2024-07-03

## 2024-07-03 RX ORDER — BUPIVACAINE HYDROCHLORIDE 2.5 MG/ML
INJECTION, SOLUTION EPIDURAL; INFILTRATION; INTRACAUDAL
Status: DISCONTINUED | OUTPATIENT
Start: 2024-07-03 | End: 2024-07-03 | Stop reason: HOSPADM

## 2024-07-03 RX ORDER — PROPOFOL 10 MG/ML
VIAL (ML) INTRAVENOUS CONTINUOUS PRN
Status: DISCONTINUED | OUTPATIENT
Start: 2024-07-03 | End: 2024-07-03

## 2024-07-03 RX ORDER — DIPHENHYDRAMINE HYDROCHLORIDE 50 MG/ML
25 INJECTION INTRAMUSCULAR; INTRAVENOUS EVERY 6 HOURS PRN
Status: DISCONTINUED | OUTPATIENT
Start: 2024-07-03 | End: 2024-07-03 | Stop reason: HOSPADM

## 2024-07-03 RX ORDER — MEPERIDINE HYDROCHLORIDE 50 MG/ML
12.5 INJECTION INTRAMUSCULAR; INTRAVENOUS; SUBCUTANEOUS EVERY 10 MIN PRN
Status: DISCONTINUED | OUTPATIENT
Start: 2024-07-03 | End: 2024-07-03 | Stop reason: HOSPADM

## 2024-07-03 RX ORDER — METRONIDAZOLE 500 MG/1
500 TABLET ORAL EVERY 8 HOURS
Status: DISCONTINUED | OUTPATIENT
Start: 2024-07-03 | End: 2024-07-04

## 2024-07-03 RX ORDER — MIDAZOLAM HYDROCHLORIDE 1 MG/ML
INJECTION INTRAMUSCULAR; INTRAVENOUS
Status: DISCONTINUED | OUTPATIENT
Start: 2024-07-03 | End: 2024-07-03

## 2024-07-03 RX ORDER — LIDOCAINE HYDROCHLORIDE 10 MG/ML
INJECTION INFILTRATION; PERINEURAL
Status: DISCONTINUED | OUTPATIENT
Start: 2024-07-03 | End: 2024-07-03 | Stop reason: HOSPADM

## 2024-07-03 RX ORDER — KETOROLAC TROMETHAMINE 30 MG/ML
15 INJECTION, SOLUTION INTRAMUSCULAR; INTRAVENOUS ONCE
Status: COMPLETED | OUTPATIENT
Start: 2024-07-03 | End: 2024-07-03

## 2024-07-03 RX ORDER — INSULIN ASPART 100 [IU]/ML
0-5 INJECTION, SOLUTION INTRAVENOUS; SUBCUTANEOUS
Status: DISCONTINUED | OUTPATIENT
Start: 2024-07-03 | End: 2024-07-05

## 2024-07-03 RX ADMIN — VANCOMYCIN HYDROCHLORIDE 1250 MG: 1.25 INJECTION, POWDER, LYOPHILIZED, FOR SOLUTION INTRAVENOUS at 10:07

## 2024-07-03 RX ADMIN — SODIUM CHLORIDE, SODIUM LACTATE, POTASSIUM CHLORIDE, AND CALCIUM CHLORIDE: .6; .31; .03; .02 INJECTION, SOLUTION INTRAVENOUS at 12:07

## 2024-07-03 RX ADMIN — INSULIN ASPART 1 UNITS: 100 INJECTION, SOLUTION INTRAVENOUS; SUBCUTANEOUS at 08:07

## 2024-07-03 RX ADMIN — MIDAZOLAM HYDROCHLORIDE 2 MG: 1 INJECTION INTRAMUSCULAR; INTRAVENOUS at 12:07

## 2024-07-03 RX ADMIN — FENTANYL CITRATE 25 MCG: 50 INJECTION, SOLUTION INTRAMUSCULAR; INTRAVENOUS at 01:07

## 2024-07-03 RX ADMIN — INSULIN ASPART 1 UNITS: 100 INJECTION, SOLUTION INTRAVENOUS; SUBCUTANEOUS at 06:07

## 2024-07-03 RX ADMIN — INSULIN GLARGINE 18 UNITS: 100 INJECTION, SOLUTION SUBCUTANEOUS at 08:07

## 2024-07-03 RX ADMIN — CEFTRIAXONE 2 G: 2 INJECTION, POWDER, FOR SOLUTION INTRAMUSCULAR; INTRAVENOUS at 03:07

## 2024-07-03 RX ADMIN — FENTANYL CITRATE 50 MCG: 50 INJECTION, SOLUTION INTRAMUSCULAR; INTRAVENOUS at 12:07

## 2024-07-03 RX ADMIN — INSULIN ASPART 2 UNITS: 100 INJECTION, SOLUTION INTRAVENOUS; SUBCUTANEOUS at 12:07

## 2024-07-03 RX ADMIN — INSULIN ASPART 2 UNITS: 100 INJECTION, SOLUTION INTRAVENOUS; SUBCUTANEOUS at 04:07

## 2024-07-03 RX ADMIN — METRONIDAZOLE 500 MG: 500 TABLET ORAL at 03:07

## 2024-07-03 RX ADMIN — PROPOFOL 50 MCG/KG/MIN: 10 INJECTION, EMULSION INTRAVENOUS at 12:07

## 2024-07-03 RX ADMIN — MEROPENEM 2 G: 1 INJECTION INTRAVENOUS at 01:07

## 2024-07-03 RX ADMIN — KETOROLAC TROMETHAMINE 15 MG: 30 INJECTION, SOLUTION INTRAMUSCULAR at 06:07

## 2024-07-03 RX ADMIN — GABAPENTIN 100 MG: 100 CAPSULE ORAL at 03:07

## 2024-07-03 RX ADMIN — ACETAMINOPHEN 650 MG: 325 TABLET ORAL at 08:07

## 2024-07-03 RX ADMIN — METRONIDAZOLE 500 MG: 500 TABLET ORAL at 10:07

## 2024-07-03 RX ADMIN — MEROPENEM 2 G: 1 INJECTION INTRAVENOUS at 11:07

## 2024-07-03 RX ADMIN — Medication 6 MG: at 08:07

## 2024-07-03 RX ADMIN — ONDANSETRON 4 MG: 2 INJECTION, SOLUTION INTRAMUSCULAR; INTRAVENOUS at 01:07

## 2024-07-03 RX ADMIN — GABAPENTIN 100 MG: 100 CAPSULE ORAL at 08:07

## 2024-07-03 NOTE — NURSING
PRN Tlenol given for elevated temperature. NAD. Patient sitting up in bed eating with family at beside.

## 2024-07-03 NOTE — OP NOTE
[facility] Surg  Operative Note      Date of Procedure: 7/3/2024     Procedure: Procedure(s) (LRB):  IRRIGATION AND DEBRIDEMENT (Right)     Surgeons and Role:     * Ayla Cordova DPM - Primary    Assisting Surgeon: None    Pre-Operative Diagnosis: Other acute osteomyelitis of right foot [M86.171]    Post-Operative Diagnosis: Post-Op Diagnosis Codes:     * Other acute osteomyelitis of right foot [M86.171]    Anesthesia: Local MAC +15 cc 1:1 mixture 0.5% marcaine plain 1% lidocaine plain     Description of Technical Procedures:   The patient was brought to the operating room on a stretcher and placed on the operating table in a supine position. Following the successful induction of MAC anesthesia, a tourniquet was applied to the patients right ankle. Following this, a local anesthetic block consisting of aproximately 15cc of  1:1 mixture of 1% lidocaine plain + 0.5% marcaine  plain was injected  Then, the right  foot was scrubbed, prepped and draped in the usual aseptic manner. Tourniquet not inflated for duration of procedure.       Attention directed to right plantar foot ulceration. Next a sterile # 5 curette utilized to debride , nonviable tissues on the right foot were debrided beyond bone . Right plantar foot ulceration noted to connect from dorsal to plantar. Purulent drainage noted. Next rongeur utilized to obtain bone specimen sent for micro and pathology. Bone noted to be soft. Next wound copiously irrigated with 3L NS pulse lavage. All bleeders cauterized with bovie.  4x4 gauze soaked in betadine applied. Wrapped with cast padding, kerlix, ACE      The patient tolerated the procedure and anesthesia well. Following a period of post op monitoring, the patient will be returned to inpatient status with the following written and oral post op instructions:     1. Keep dressing dry and intact until reassessment tomorrow   2. Complete non weightbearing to the right foot ok for heel WB for transfers   3.  Continue pain control and medical management per Medicine team.                   Significant Surgical Tasks Conducted by the Assistant(s), if Applicable: none     Estimated Blood Loss (EBL): <10ml           Implants: * No implants in log *    Specimens:   Specimen (24h ago, onward)       Start     Ordered    07/03/24 1345  Specimen to Pathology, Surgery Other (Podiatry)  Once        Comments: Pre-op Diagnosis: Other acute osteomyelitis of right foot [M86.171]Procedure(s):IRRIGATION AND DEBRIDEMENT Number of specimens: 1Name of specimens: Right Foot Wound     References:    Click here for ordering Quick Tip   Question Answer Comment   Procedure Type: Other Podiatry   Specimen Class: Routine/Screening    Release to patient Immediate        07/03/24 1349    07/03/24 1325  Specimen to Pathology, Surgery Other (Podiatry)  Once        Comments: Pre-op Diagnosis: Other acute osteomyelitis of right foot [M86.171]Procedure(s):IRRIGATION AND DEBRIDEMENT Number of specimens: 1Name of specimens: right foot bone     References:    Click here for ordering Quick Tip   Question Answer Comment   Procedure Type: Other Podiatry   Specimen Class: Routine/Screening    Release to patient Immediate        07/03/24 1357                            Condition: Good    Disposition: PACU - hemodynamically stable.    Attestation: I was present and scrubbed for the entire procedure.

## 2024-07-03 NOTE — NURSING
[facility]  Nurses Note -- 4 Eyes      7/3/2024       Skin assessed on: Q Shift      [x] No Pressure Injuries Present    [x]Prevention Measures Documented    [] Yes LDA  for Pressure Injury Previously documented     [] Yes New Pressure Injury Discovered   [] LDA for New Pressure Injury Added      Attending RN:  Jen Espinoza RN     Second RN:  SATISH Joe

## 2024-07-03 NOTE — NURSING
Patient c/o ankle pain 8/10. Provider was notified and one time ordered placed for Tramadol. Plan of care ongoing.

## 2024-07-03 NOTE — SUBJECTIVE & OBJECTIVE
"Past Medical History:   Diagnosis Date    Diabetes mellitus     High cholesterol     Hypertension        Past Surgical History:   Procedure Laterality Date    CHOLECYSTECTOMY         Review of patient's allergies indicates:   Allergen Reactions    Penicillins Anaphylaxis       Medications:  Medications Prior to Admission   Medication Sig    amlodipine-olmesartan (KENZIE) 10-20 mg per tablet Take 1 tablet by mouth once daily.    blood-glucose meter,continuous (DEXCOM G6 ) Misc Use as needed to monitor blood glucose    blood-glucose sensor (DEXCOM G6 SENSOR) Alicja Use as needed to monitor blood glucose    blood-glucose transmitter (DEXCOM G6 TRANSMITTER) Alicaj Use as needed to monitor blood glucose    flash glucose sensor (FREESTYLE COTY 2 SENSOR) Kit 1 Device by Misc.(Non-Drug; Combo Route) route every 14 (fourteen) days.    FREESTYLE COTY 14 DAY READER Misc Use with sensor as directed    gabapentin (NEURONTIN) 100 MG capsule Take 1 capsule (100 mg total) by mouth 3 (three) times daily.    hydroCHLOROthiazide (MICROZIDE) 12.5 mg capsule Take 1 capsule (12.5 mg total) by mouth every morning.    metronidazole 1% (METROGEL) 1 % Gel Apply topically every other day.    [DISCONTINUED] doxycycline (VIBRAMYCIN) 100 MG Cap Take 1 capsule (100 mg total) by mouth 2 (two) times daily.     Antibiotics (From admission, onward)      Start     Stop Route Frequency Ordered    07/02/24 2200  vancomycin 1,250 mg in D5W 250 mL IVPB (Vial-Mate)         -- IV Every 12 hours (non-standard times) 07/02/24 1831    07/02/24 1700  meropenem (MERREM) 2 g in 0.9% NaCl 100 mL IVPB         -- IV Every 8 hours (non-standard times) 07/02/24 1607    07/02/24 1621  vancomycin - pharmacy to dose  (vancomycin IVPB (PEDS and ADULTS))        Placed in "And" Linked Group    -- IV pharmacy to manage frequency 07/02/24 1525          Antifungals (From admission, onward)      None          Antivirals (From admission, onward)      None         "     Immunization History   Administered Date(s) Administered    COVID-19, MRNA, LN-S, PF (MODERNA FULL 0.5 ML DOSE) 06/08/2022, 08/04/2022    Tdap 07/22/2019       Family History       Problem Relation (Age of Onset)    Diabetes Mother, Maternal Grandfather, Paternal Grandfather    Hypertension Mother, Father, Maternal Grandfather, Paternal Grandfather    Miscarriages / Stillbirths Paternal Grandmother          Social History     Socioeconomic History    Marital status: Significant Other     Spouse name: pedro    Number of children: 2   Occupational History    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[employer]   Tobacco Use    Smoking status: Every Day     Current packs/day: 1.00     Average packs/day: 1 pack/day for 30.0 years (30.0 ttl pk-yrs)     Types: Cigarettes     Start date: 7/12/1994    Smokeless tobacco: Never   Substance and Sexual Activity    Alcohol use: Yes     Alcohol/week: 3.0 - 4.0 standard drinks of alcohol     Types: 3 - 4 Cans of beer per week     Comment: Daily    Drug use: Yes     Frequency: 5.0 times per week     Types: Marijuana    Sexual activity: Yes     Partners: Female   Social History Narrative    Lives with mother in law and girlfriend     Review of Systems   Constitutional:  Negative for chills, fatigue and fever.   Cardiovascular: Negative.    Gastrointestinal: Negative.    Musculoskeletal:  Positive for joint swelling.   Skin:  Positive for color change and wound.     Objective:     Vital Signs (Most Recent):  Temp: 98 °F (36.7 °C) (07/03/24 1053)  Pulse: 81 (07/03/24 1127)  Resp: 18 (07/03/24 1053)  BP: 134/78 (07/03/24 1053)  SpO2: 98 % (07/03/24 1053) Vital Signs (24h Range):  Temp:  [98 °F (36.7 °C)-102.8 °F (39.3 °C)] 98 °F (36.7 °C)  Pulse:  [] 81  Resp:  [17-20] 18  SpO2:  [94 %-99 %] 98 %  BP: (109-157)/(57-82) 134/78     Weight: 89.2 kg (196 lb 10.4 oz)  Body mass index is 31.74 kg/m².    Estimated Creatinine Clearance: 95.6 mL/min (based on SCr of 1 mg/dL).     Physical Exam  Vitals and  nursing note reviewed.   Constitutional:       Appearance: Normal appearance. He is not ill-appearing.   HENT:      Head: Normocephalic.      Mouth/Throat:      Mouth: Mucous membranes are moist.      Pharynx: Oropharynx is clear.   Pulmonary:      Effort: Pulmonary effort is normal. No respiratory distress.   Musculoskeletal:      Comments: R foot wrapped with strikethrough bleeding    Skin:     General: Skin is warm and dry.   Neurological:      Mental Status: He is alert.          Significant Labs:   Microbiology Results (last 7 days)       Procedure Component Value Units Date/Time    Gram stain [[id number]] Collected: 07/03/24 0810    Order Status: Completed Specimen: Wound from Foot, Right Updated: 07/03/24 1006     Gram Stain Result Moderate WBC's      Moderate Gram positive cocci      Few Gram negative rods    Aerobic culture (Specify Source) **CANNOT BE ORDERED AS STAT** [[id number]]  (Abnormal) Collected: 07/02/24 0844    Order Status: Completed Specimen: Wound from Leg, Right Updated: 07/03/24 0948     Aerobic Bacterial Culture STREPTOCOCCUS GROUP G  Moderate  Beta-hemolytic streptococci are routinely susceptible to   penicillins,cephalosporins and carbapenems.      Culture, Anaerobe [[id number]] Collected: 07/03/24 0810    Order Status: Sent Specimen: Wound from Foot, Right Updated: 07/03/24 0821    Aerobic culture [[id number]] Collected: 07/03/24 0810    Order Status: Sent Specimen: Wound from Foot, Right Updated: 07/03/24 0820    Blood culture [[id number]] Collected: 07/02/24 1603    Order Status: Completed Specimen: Blood from Peripheral, Antecubital, Left Updated: 07/02/24 2312     Blood Culture, Routine No Growth to date    Blood culture [[id number]] Collected: 07/02/24 1610    Order Status: Completed Specimen: Blood from Peripheral, Antecubital, Right Updated: 07/02/24 2312     Blood Culture, Routine No Growth to date    Blood culture #1 **CANNOT BE ORDERED STAT** [[id number]] Collected: 07/02/24  0855    Order Status: Completed Specimen: Blood from Peripheral, Antecubital, Right Updated: 07/02/24 1912     Blood Culture, Routine No Growth to date    Blood culture #2 **CANNOT BE ORDERED STAT** [[id number]] Collected: 07/02/24 0855    Order Status: Completed Specimen: Blood from Peripheral, Antecubital, Left Updated: 07/02/24 1912     Blood Culture, Routine No Growth to date    Culture, Body Fluid (Aerobic) w/ GS [[id number]]     Order Status: Canceled Specimen: Body Fluid             Significant Imaging: I have reviewed all pertinent imaging results/findings within the past 24 hours.

## 2024-07-03 NOTE — CONSULTS
[facility] Surg  Podiatry  Consult Note    Patient Name: Hany Guerin  MRN: [id number]  Admission Date: 7/2/2024  Hospital Length of Stay: 1 days  Attending Physician: Nabeel Jovel MD  Primary Care Provider: Lyle Thorne MD     Inpatient consult to Podiatry  Consult performed by: Ayla Cordova DPM  Consult ordered by: Nabeel Jovel MD        Subjective:     History of Present Illness: 47 y/o male PMH DM2 right hallux amputation admitted for right foot infection. Reports wound present for several years, however patient stopped seeing wound care. Patient reports injuring right ankle x several days while cutting grass which prompted him to report to ED.     Scheduled Meds:   gabapentin  100 mg Oral TID    insulin glargine U-100  18 Units Subcutaneous QHS    melatonin  6 mg Oral Nightly    meropenem IV (PEDS and ADULTS)  2 g Intravenous Q8H    nicotine  1 patch Transdermal Daily    vancomycin (VANCOCIN) IV (PEDS and ADULTS)  1,250 mg Intravenous Q12H     Continuous Infusions:  PRN Meds:  Current Facility-Administered Medications:     acetaminophen, 650 mg, Oral, Q8H PRN    glucagon (human recombinant), 1 mg, Intramuscular, PRN    glucagon (human recombinant), 1 mg, Intramuscular, PRN    glucose, 16 g, Oral, PRN    glucose, 24 g, Oral, PRN    insulin aspart U-100, 0-5 Units, Subcutaneous, Q6H PRN    naloxone, 0.02 mg, Intravenous, PRN    polyethylene glycol, 17 g, Oral, BID PRN    Pharmacy to dose Vancomycin consult, , , Once **AND** vancomycin - pharmacy to dose, , Intravenous, pharmacy to manage frequency    Review of patient's allergies indicates:   Allergen Reactions    Penicillins Anaphylaxis        Past Medical History:   Diagnosis Date    Diabetes mellitus     High cholesterol     Hypertension      Past Surgical History:   Procedure Laterality Date    CHOLECYSTECTOMY         Family History       Problem Relation (Age of Onset)    Diabetes Mother, Maternal Grandfather, Paternal Grandfather     Hypertension Mother, Father, Maternal Grandfather, Paternal Grandfather    Miscarriages / Stillbirths Paternal Grandmother          Tobacco Use    Smoking status: Every Day     Current packs/day: 1.00     Average packs/day: 1 pack/day for 30.0 years (30.0 ttl pk-yrs)     Types: Cigarettes     Start date: 7/12/1994    Smokeless tobacco: Never   Substance and Sexual Activity    Alcohol use: Yes     Alcohol/week: 3.0 - 4.0 standard drinks of alcohol     Types: 3 - 4 Cans of beer per week     Comment: Daily    Drug use: Yes     Frequency: 5.0 times per week     Types: Marijuana    Sexual activity: Yes     Partners: Female     Review of Systems   Constitutional:  Positive for activity change.   Respiratory:  Negative for shortness of breath.    Cardiovascular:  Negative for chest pain and leg swelling.   Gastrointestinal:  Negative for nausea and vomiting.   Musculoskeletal:  Positive for arthralgias.   Skin:  Positive for wound.   Neurological:  Positive for numbness. Negative for weakness.   Psychiatric/Behavioral: Negative.       Objective:     Vital Signs (Most Recent):  Temp: 99.3 °F (37.4 °C) (07/03/24 0657)  Pulse: 89 (07/03/24 0715)  Resp: 18 (07/03/24 0657)  BP: (!) 148/80 (07/03/24 0657)  SpO2: 97 % (07/03/24 0657) Vital Signs (24h Range):  Temp:  [99.1 °F (37.3 °C)-102.8 °F (39.3 °C)] 99.3 °F (37.4 °C)  Pulse:  [] 89  Resp:  [17-20] 18  SpO2:  [94 %-99 %] 97 %  BP: (109-157)/(57-82) 148/80     Weight: 89.2 kg (196 lb 10.4 oz)  Body mass index is 31.74 kg/m².    Foot Exam    General  Orientation: alert and oriented to person, place, and time       Right Foot/Ankle     Inspection and Palpation  Skin Exam: ulcer;     Neurovascular  Dorsalis pedis: 1+  Posterior tibial: 1+  Saphenous nerve sensation: diminished  Tibial nerve sensation: diminished  Superficial peroneal nerve sensation: diminished  Deep peroneal nerve sensation: diminished  Sural nerve sensation: diminished      Left Foot/Ankle      Inspection  and Palpation  Skin Exam: no ulcer     Neurovascular  Dorsalis pedis: 1+  Posterior tibial: 1+  Saphenous nerve sensation: diminished  Tibial nerve sensation: diminished  Superficial peroneal nerve sensation: diminished  Deep peroneal nerve sensation: diminished  Sural nerve sensation: diminished        7/3/24:    Pre debridement  wound with purulent drainage, probe to bone.       Post debridement       Laboratory:  CBC:   Recent Labs   Lab 07/03/24  0559   WBC 8.75   RBC 3.42*   HGB 9.1*   HCT 29.9*      MCV 87   MCH 26.6*   MCHC 30.4*     CMP:   Recent Labs   Lab 07/03/24  0558   *   CALCIUM 9.1   ALBUMIN 1.6*   PROT 7.4   *   K 4.0   CO2 26   CL 99   BUN 17   CREATININE 1.0   ALKPHOS 157*   ALT 9*   AST 10   BILITOT 0.2       Diagnostic Results:  Xray: X-Ray Foot Complete Right  Order: [id number]  Status: Final result       Visible to patient: Yes (not seen)       Next appt: None       Dx: Other acute osteomyelitis of right foot    0 Result Notes  Details    Reading Physician Reading Date Result Priority   Cristian Newberry MD  [phone]  [phone] 7/3/2024 Routine     Narrative & Impression  EXAMINATION:  XR FOOT COMPLETE 3 VIEW RIGHT     CLINICAL HISTORY:  wound right foot;. Other acute osteomyelitis, right ankle and foot     TECHNIQUE:  3 views of the right foot     COMPARISON:  Right foot radiographs 07/22/2019     FINDINGS:  Postoperative changes of transmetatarsal amputation of the 1st digit with acute osseous erosive changes suspected throughout the residual 1st metatarsal none surrounding subcutaneous emphysema.     Impression:     1. Postoperative changes of transmetatarsal amputation of the 1st digit with acute osseous erosive changes suspected throughout the residual 1st metatarsal none surrounding subcutaneous emphysema concerning for acute osteomyelitis.  This report was flagged in Epic as abnormal.       Clinical Findings:  Right foot ulceration with purulent drainage.      Assessment/Plan:     Active Diagnoses:    Diagnosis Date Noted POA    PRINCIPAL PROBLEM:  Acute osteomyelitis of 1st metatarsal bone of right foot [M86.171] 07/02/2024 Yes    Type 2 diabetes mellitus with hyperglycemia, without long-term current use of insulin [E11.65] 07/20/2022 Yes    Tobacco dependence due to cigarettes [F17.210] 07/20/2022 Yes    Benign essential HTN [I10] 07/20/2022 Yes      Problems Resolved During this Admission:       Debridement: With verbal consent, nonviable tissues on the right foot were debrided beyond sub q utilizing a  sterile No. 3 scalpel and forceps. Minimal bleeding controlled with direct pressure  The patient tolerated this well.   Culture obtained  DSD applied.     Plan for OR debridement, bone biopsy today. Case request placed    The risks, benefits, complications, treatment options, and expected outcomes were discussed with the patient. The risks and potential complications of their problem and purposed treatment include but are not limited to infection, nerve injury, vascular injury, persistent pain, potential skin necrosis, deep vein thrombosis, possible pulmonary embolus,and complications of the anesthetics. Informed verbal and written consent was obtained. Consent forms read, signed, witnessed.    Arterial US ordered    PT pulse lavage ordered     NPO    Thank you for your consult. I will follow-up with patient. Please contact us if you have any additional questions.    Ayla Cordova DPM  Podiatry  [facility] - [place] - Med Surg

## 2024-07-03 NOTE — CONSULTS
[facility] Surgery  Infectious Disease  Consult Note    Patient Name: Hany Guerin  MRN: [id number]  Admission Date: 7/2/2024  Hospital Length of Stay: 1 days  Attending Physician: Nabeel Jovel MD  Primary Care Provider: Lyle Thorne MD     Isolation Status: No active isolations    Patient information was obtained from patient and ER records.      Inpatient consult to Infectious Diseases  Consult performed by: Hermelinda Man MD  Consult ordered by: Nabeel Jovle MD        Assessment/Plan:     ID  * Acute osteomyelitis of 1st metatarsal bone of right foot  Hany Guerin is a 48 year old man with diabetes and right hallux amputation (2022) who was admitted for right foot infection. He states he has had a wound present since his amputation. He does not follow with wound care or podiatry. MRI foot with midfoot and forefoot cellulitis and osteomyelitis of the 1st metatarsal stump. Wound culture collected with GBS. He has not been on antibiotics recently, not admitted recently, has no water exposures. No risk for MDR or pseudomonal infections. He is clinically stable and has undergone bedside debridement with plan to go to OR today.     Recommendations  - continue vancomycin goal trough 15-20 mcg/ml, dosing per pharmacy  - stop meropenem  - start ceftriaxone 2 grams q24 hours   - start metronidazole 500 q12 hours       Above discussed with primary team.     Time: 75 minutes   50% of time spent on face-to-face counseling and coordination of care. Counseling included review of test results, diagnosis, and treatment plan with patient and/or family.  I have reviewed hospital notes from  service and other specialty providers as well as outside medical records. I have also reviewed CBC, CMP/BMP,  cultures and imaging with my interpretation as documented. Patient is high risk of morbidity, on antibiotics requiring intensive monitoring for toxicity.     Thank you for your consult. I will follow-up with  patient. Please contact us if you have any additional questions.    Hermelinda Man MD  Infectious Disease  [facility] [place] - Surgery    Subjective:     Principal Problem: Acute osteomyelitis of metatarsal bone of right foot    HPI: Hany Guerin is a 48 year old man with diabetes and right hallux amputation (2022) who was admitted for right foot infection. He states he has had a wound present since his amputation. He does not follow with wound care or podiatry. He presented because he fell while mowing the grass. He always wears shoes when outside (frequently crocs). He does not walk around barefoot. He keeps his foot wrapped. He has not been on antibiotics for multiple years. He does not soak is foot in water or go swimming. He has cats, they have not scratched/bitten him recently. He is on city water. He denies fevers or chills. Penicillin allergy is from childhood, he states he was told he had anaphylaxis but has no memory of it.     Past Medical History:   Diagnosis Date    Diabetes mellitus     High cholesterol     Hypertension        Past Surgical History:   Procedure Laterality Date    CHOLECYSTECTOMY         Review of patient's allergies indicates:   Allergen Reactions    Penicillins Anaphylaxis       Medications:  Medications Prior to Admission   Medication Sig    amlodipine-olmesartan (KENZIE) 10-20 mg per tablet Take 1 tablet by mouth once daily.    blood-glucose meter,continuous (DEXCOM G6 ) Misc Use as needed to monitor blood glucose    blood-glucose sensor (DEXCOM G6 SENSOR) Alicja Use as needed to monitor blood glucose    blood-glucose transmitter (DEXCOM G6 TRANSMITTER) Alicja Use as needed to monitor blood glucose    flash glucose sensor (FREESTYLE COTY 2 SENSOR) Kit 1 Device by Misc.(Non-Drug; Combo Route) route every 14 (fourteen) days.    FREESTYLE COTY 14 DAY READER Misc Use with sensor as directed    gabapentin (NEURONTIN) 100 MG capsule Take 1 capsule (100 mg total) by mouth 3  "(three) times daily.    hydroCHLOROthiazide (MICROZIDE) 12.5 mg capsule Take 1 capsule (12.5 mg total) by mouth every morning.    metronidazole 1% (METROGEL) 1 % Gel Apply topically every other day.    [DISCONTINUED] doxycycline (VIBRAMYCIN) 100 MG Cap Take 1 capsule (100 mg total) by mouth 2 (two) times daily.     Antibiotics (From admission, onward)      Start     Stop Route Frequency Ordered    07/02/24 2200  vancomycin 1,250 mg in D5W 250 mL IVPB (Vial-Mate)         -- IV Every 12 hours (non-standard times) 07/02/24 1831    07/02/24 1700  meropenem (MERREM) 2 g in 0.9% NaCl 100 mL IVPB         -- IV Every 8 hours (non-standard times) 07/02/24 1607    07/02/24 1621  vancomycin - pharmacy to dose  (vancomycin IVPB (PEDS and ADULTS))        Placed in "And" Linked Group    -- IV pharmacy to manage frequency 07/02/24 1525          Antifungals (From admission, onward)      None          Antivirals (From admission, onward)      None             Immunization History   Administered Date(s) Administered    COVID-19, MRNA, LN-S, PF (MODERNA FULL 0.5 ML DOSE) 06/08/2022, 08/04/2022    Tdap 07/22/2019       Family History       Problem Relation (Age of Onset)    Diabetes Mother, Maternal Grandfather, Paternal Grandfather    Hypertension Mother, Father, Maternal Grandfather, Paternal Grandfather    Miscarriages / Stillbirths Paternal Grandmother          Social History     Socioeconomic History    Marital status: Significant Other     Spouse name: pedro    Number of children: 2   Occupational History    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[employer]   Tobacco Use    Smoking status: Every Day     Current packs/day: 1.00     Average packs/day: 1 pack/day for 30.0 years (30.0 ttl pk-yrs)     Types: Cigarettes     Start date: 7/12/1994    Smokeless tobacco: Never   Substance and Sexual Activity    Alcohol use: Yes     Alcohol/week: 3.0 - 4.0 standard drinks of alcohol     Types: 3 - 4 Cans of beer per week     Comment: Daily    Drug use: Yes     " Frequency: 5.0 times per week     Types: Marijuana    Sexual activity: Yes     Partners: Female   Social History Narrative    Lives with mother in law and girlfriend     Review of Systems   Constitutional:  Negative for chills, fatigue and fever.   Cardiovascular: Negative.    Gastrointestinal: Negative.    Musculoskeletal:  Positive for joint swelling.   Skin:  Positive for color change and wound.     Objective:     Vital Signs (Most Recent):  Temp: 98 °F (36.7 °C) (07/03/24 1053)  Pulse: 81 (07/03/24 1127)  Resp: 18 (07/03/24 1053)  BP: 134/78 (07/03/24 1053)  SpO2: 98 % (07/03/24 1053) Vital Signs (24h Range):  Temp:  [98 °F (36.7 °C)-102.8 °F (39.3 °C)] 98 °F (36.7 °C)  Pulse:  [] 81  Resp:  [17-20] 18  SpO2:  [94 %-99 %] 98 %  BP: (109-157)/(57-82) 134/78     Weight: 89.2 kg (196 lb 10.4 oz)  Body mass index is 31.74 kg/m².    Estimated Creatinine Clearance: 95.6 mL/min (based on SCr of 1 mg/dL).     Physical Exam  Vitals and nursing note reviewed.   Constitutional:       Appearance: Normal appearance. He is not ill-appearing.   HENT:      Head: Normocephalic.      Mouth/Throat:      Mouth: Mucous membranes are moist.      Pharynx: Oropharynx is clear.   Pulmonary:      Effort: Pulmonary effort is normal. No respiratory distress.   Musculoskeletal:      Comments: R foot wrapped with strikethrough bleeding    Skin:     General: Skin is warm and dry.   Neurological:      Mental Status: He is alert.          Significant Labs:   Microbiology Results (last 7 days)       Procedure Component Value Units Date/Time    Gram stain [[id number]] Collected: 07/03/24 0810    Order Status: Completed Specimen: Wound from Foot, Right Updated: 07/03/24 1006     Gram Stain Result Moderate WBC's      Moderate Gram positive cocci      Few Gram negative rods    Aerobic culture (Specify Source) **CANNOT BE ORDERED AS STAT** [[id number]]  (Abnormal) Collected: 07/02/24 0844    Order Status: Completed Specimen: Wound from Leg,  Right Updated: 07/03/24 0948     Aerobic Bacterial Culture STREPTOCOCCUS GROUP G  Moderate  Beta-hemolytic streptococci are routinely susceptible to   penicillins,cephalosporins and carbapenems.      Culture, Anaerobe [[id number]] Collected: 07/03/24 0810    Order Status: Sent Specimen: Wound from Foot, Right Updated: 07/03/24 0821    Aerobic culture [[id number]] Collected: 07/03/24 0810    Order Status: Sent Specimen: Wound from Foot, Right Updated: 07/03/24 0820    Blood culture [[id number]] Collected: 07/02/24 1603    Order Status: Completed Specimen: Blood from Peripheral, Antecubital, Left Updated: 07/02/24 2312     Blood Culture, Routine No Growth to date    Blood culture [[id number]] Collected: 07/02/24 1610    Order Status: Completed Specimen: Blood from Peripheral, Antecubital, Right Updated: 07/02/24 2312     Blood Culture, Routine No Growth to date    Blood culture #1 **CANNOT BE ORDERED STAT** [[id number]] Collected: 07/02/24 0855    Order Status: Completed Specimen: Blood from Peripheral, Antecubital, Right Updated: 07/02/24 1912     Blood Culture, Routine No Growth to date    Blood culture #2 **CANNOT BE ORDERED STAT** [[id number]] Collected: 07/02/24 0855    Order Status: Completed Specimen: Blood from Peripheral, Antecubital, Left Updated: 07/02/24 1912     Blood Culture, Routine No Growth to date    Culture, Body Fluid (Aerobic) w/ GS [[id number]]     Order Status: Canceled Specimen: Body Fluid             Significant Imaging: I have reviewed all pertinent imaging results/findings within the past 24 hours.

## 2024-07-03 NOTE — ANESTHESIA PREPROCEDURE EVALUATION
07/03/2024    Pre-operative evaluation for Procedure(s) (LRB):  IRRIGATION AND DEBRIDEMENT (Right)    Hany Guerin is a 48 y.o. male     Patient Active Problem List   Diagnosis    Pancreatitis    Diabetes mellitus type 2 in nonobese    Hypertension    Tobacco abuse    COVID-19    Amputated great toe, right    Type 2 diabetes mellitus with foot ulcer, with long-term current use of insulin    Diabetic ulcer of left foot associated with type 2 diabetes mellitus, with bone involvement without evidence of necrosis    Tobacco dependence due to cigarettes    Type 2 diabetes mellitus with hyperglycemia, without long-term current use of insulin    Benign essential HTN    Chronic osteomyelitis of left foot    Acute osteomyelitis of 1st metatarsal bone of right foot       Review of patient's allergies indicates:   Allergen Reactions    Penicillins Anaphylaxis       No current facility-administered medications on file prior to encounter.     Current Outpatient Medications on File Prior to Encounter   Medication Sig Dispense Refill    amlodipine-olmesartan (KENZIE) 10-20 mg per tablet Take 1 tablet by mouth once daily. 90 tablet 1    blood-glucose meter,continuous (DEXCOM G6 ) Misc Use as needed to monitor blood glucose 1 each 0    blood-glucose sensor (DEXCOM G6 SENSOR) Alicja Use as needed to monitor blood glucose 3 each 12    blood-glucose transmitter (DEXCOM G6 TRANSMITTER) Alicja Use as needed to monitor blood glucose 1 each 2    flash glucose sensor (FREESTYLE COTY 2 SENSOR) Kit 1 Device by Misc.(Non-Drug; Combo Route) route every 14 (fourteen) days. 1 kit 5    FREESTYLE COTY 14 DAY READER Misc Use with sensor as directed 1 each 0    gabapentin (NEURONTIN) 100 MG capsule Take 1 capsule (100 mg total) by mouth 3 (three) times daily. 90 capsule 11    hydroCHLOROthiazide (MICROZIDE) 12.5 mg capsule Take 1  capsule (12.5 mg total) by mouth every morning. 90 capsule 1    metronidazole 1% (METROGEL) 1 % Gel Apply topically every other day.         Past Surgical History:   Procedure Laterality Date    CHOLECYSTECTOMY         LABS  CBC:   Recent Labs     07/02/24  1603 07/03/24  0559   WBC 9.10 8.75   RBC 3.53* 3.42*   HGB 9.4* 9.1*   HCT 29.9* 29.9*    278   MCV 85 87   MCH 26.6* 26.6*   MCHC 31.4* 30.4*       CMP:   Recent Labs     07/02/24  1602 07/03/24  0558   * 132*   K 4.9 4.0   CL 99 99   CO2 28 26   BUN 13 17   CREATININE 1.0 1.0   * 203*   MG 1.8 1.8   PHOS 3.5 3.3   CALCIUM 9.4 9.1   ALBUMIN 1.9* 1.6*   PROT 8.1 7.4   ALKPHOS 162* 157*   ALT 11 9*   AST 15 10   BILITOT 0.5 0.2       INR  Recent Labs     07/02/24  1603   INR 1.0       Pre-op Assessment    I have reviewed the Patient Summary Reports.     I have reviewed the Nursing Notes. I have reviewed the NPO Status.   I have reviewed the Medications.     Review of Systems  Social:  Smoker, Recreational Drugs, Alcohol Use THC use      Hematology/Oncology:  Hematology Normal   Oncology Normal                                   EENT/Dental:  EENT/Dental Normal           Cardiovascular:     Hypertension               Patient non-compliant with meds                         Pulmonary:  Pulmonary Normal                       Renal/:  Renal/ Normal                 Hepatic/GI:  Hepatic/GI Normal                 Musculoskeletal:     osteomyelitis            Neurological:  Neurology Normal                                      Endocrine:  Diabetes, poorly controlled, type 2, using insulin   Patient non-compliant with meds      Obesity / BMI > 30  Psych:  Psychiatric Normal                    Physical Exam  General: Well nourished, Cooperative, Alert and Oriented    Airway:  Mallampati: II / II  Mouth Opening: Normal  TM Distance: Normal  Tongue: Normal  Neck ROM: Normal ROM    Dental:  Intact    Chest/Lungs:  Clear to auscultation, Normal  Respiratory Rate    Heart:  Rate: Normal  Rhythm: Regular Rhythm  Sounds: Normal        Anesthesia Plan  Type of Anesthesia, risks & benefits discussed:    Anesthesia Type: Gen Supraglottic Airway, Gen ETT  Intra-op Monitoring Plan: Standard ASA Monitors  Post Op Pain Control Plan: multimodal analgesia  Induction:  IV  Airway Plan: Video and Direct, Post-Induction  Informed Consent: Informed consent signed with the Patient and all parties understand the risks and agree with anesthesia plan.  All questions answered.   ASA Score: 3  Day of Surgery Review of History & Physical: H&P Update referred to the surgeon/provider.    Ready For Surgery From Anesthesia Perspective.     .

## 2024-07-03 NOTE — PT/OT/SLP PROGRESS
Physical Therapy      Patient Name:  Hany Guerin   MRN:  [id number]    Patient not seen today secondary to Therapist assessment, Other (Comment) (Pt sched for OR debridment and bone bx today.  PT to hold pending podiatry recommendation.)

## 2024-07-03 NOTE — PROGRESS NOTES
[facility] Medicine  Progress Note    Patient Name: Hany Guerin  MRN: [id number]  Patient Class: IP- Inpatient   Admission Date: 7/2/2024  Length of Stay: 1 days  Attending Physician: Nabeel Jovel MD  Primary Care Provider: Lyle Thorne MD        Subjective:     Principal Problem:Acute osteomyelitis of metatarsal bone of right foot        HPI:    Hany Guerin is a 48 y.o. male who has a past medical history of Diabetes mellitus, High cholesterol, and Hypertension, presented to the ED with CC of Ankle Pain after fall.       No fracture or significant pathology was discovered of his ankle other than potential avascular necrosis of distal tibia; however, Xray did show soft tissue swelling bony destruction and gas seen in the portions of the foot along the 1st metatarsal. Patient has had long langford with osteomyelitis in the past of the right foot. He has had 1st R toe amp previously. Does still have drainage from foot, constantly. He has not been to wound care in over a year. Has not been on antibiotics for 2 years. And is not taking any insulin and his glucose is >500 on admission. He is not in DKA; no ketones in urine and pH wnl. Sodium 130. Hb dropped to 15 to 9.4, SED rate 119 & CRP elevated, WBC and Cr in normal limits however (LRINEC score 9). Podiatry and ID consulted. PEN allergy, anaphylaxis. Started on Vanc+Tomi.         Overview/Hospital Course:  Admitted after fall and sprained ankle, but found to have acute on chronic OM of 1st metatarsal base.   and . V+Tomi. Podiatry and ID consulted. Procedure 7/3/24.       Review of Systems   Genitourinary:  Negative for difficulty urinating, dysuria and hematuria.   Musculoskeletal:  Positive for arthralgias, gait problem and joint swelling.   Skin:  Positive for color change and wound. Negative for rash.     Objective:      Vitals:    07/03/24 0424 07/03/24 0657 07/03/24 0701 07/03/24 0715   BP: 119/74 (!)  148/80 (!) 148/80    BP Location: Left arm      Patient Position: Lying Lying     Pulse: 91 92  89   Resp: 18 18 18    Temp: 99.8 °F (37.7 °C) 99.3 °F (37.4 °C) 99.3 °F (37.4 °C)    TempSrc: Oral Oral     SpO2: 97% 97% 97%    Weight:       Height:           Body mass index is 31.74 kg/m².     Physical Exam  Vitals and nursing note reviewed.   Constitutional:       General: He is not in acute distress.     Appearance: He is well-developed. He is obese. He is not ill-appearing or diaphoretic.   HENT:      Head: Normocephalic and atraumatic.   Eyes:      General: No scleral icterus.     Pupils: Pupils are equal, round, and reactive to light.   Neck:      Thyroid: No thyromegaly.   Cardiovascular:      Rate and Rhythm: Normal rate and regular rhythm.      Heart sounds: No murmur heard.  Pulmonary:      Effort: Pulmonary effort is normal.      Breath sounds: Normal breath sounds. No stridor. No wheezing or rales.   Abdominal:      General: There is no distension.      Palpations: Abdomen is soft.      Tenderness: There is no guarding.   Musculoskeletal:         General: Swelling and deformity present. Normal range of motion.      Cervical back: Normal range of motion.      Right lower leg: Edema present.      Left lower leg: No edema.   Skin:     General: Skin is warm.      Capillary Refill: Capillary refill takes less than 2 seconds.      Findings: Lesion present.   Neurological:      Mental Status: He is alert and oriented to person, place, and time.      Cranial Nerves: No cranial nerve deficit.      Motor: No weakness.   Psychiatric:         Mood and Affect: Mood normal.         Behavior: Behavior normal.         Thought Content: Thought content normal.         Judgment: Judgment normal.       Recent Results (from the past 24 hour(s))   POCT glucose    Collection Time: 07/02/24 10:02 AM   Result Value Ref Range    POCT Glucose >500 (HH) 70 - 110 mg/dL   Troponin ISTAT    Collection Time: 07/02/24 10:42 AM   Result  Value Ref Range    POC Cardiac Troponin I 0.00 0.00 - 0.08 ng/mL    Sample unknown    POCT glucose    Collection Time: 07/02/24 11:02 AM   Result Value Ref Range    POCT Glucose 432 (H) 70 - 110 mg/dL   POCT URINALYSIS W/O SCOPE    Collection Time: 07/02/24 11:50 AM   Result Value Ref Range    Glucose, UA 2+ (A)     Bilirubin, UA Negative     Ketones, UA Negative     Spec Grav UA 1.015     Blood, UA 1+ (A)     PH, UA 5.5     Protein, UA 2+ (A)     Urobilinogen, UA 0.2 E.U./dL    Nitrite, UA Negative     Leukocytes, UA Negative     Color, UA Yellow     Clarity, UA Clear    POCT glucose    Collection Time: 07/02/24 12:22 PM   Result Value Ref Range    POCT Glucose 478 (HH) 70 - 110 mg/dL   POCT glucose    Collection Time: 07/02/24  1:16 PM   Result Value Ref Range    POCT Glucose 291 (H) 70 - 110 mg/dL   Magnesium    Collection Time: 07/02/24  4:02 PM   Result Value Ref Range    Magnesium 1.8 1.6 - 2.6 mg/dL   Phosphorus    Collection Time: 07/02/24  4:02 PM   Result Value Ref Range    Phosphorus 3.5 2.7 - 4.5 mg/dL   Comprehensive Metabolic Panel    Collection Time: 07/02/24  4:02 PM   Result Value Ref Range    Sodium 133 (L) 136 - 145 mmol/L    Potassium 4.9 3.5 - 5.1 mmol/L    Chloride 99 95 - 110 mmol/L    CO2 28 23 - 29 mmol/L    Glucose 192 (H) 70 - 110 mg/dL    BUN 13 6 - 20 mg/dL    Creatinine 1.0 0.5 - 1.4 mg/dL    Calcium 9.4 8.7 - 10.5 mg/dL    Total Protein 8.1 6.0 - 8.4 g/dL    Albumin 1.9 (L) 3.5 - 5.2 g/dL    Total Bilirubin 0.5 0.1 - 1.0 mg/dL    Alkaline Phosphatase 162 (H) 55 - 135 U/L    AST 15 10 - 40 U/L    ALT 11 10 - 44 U/L    eGFR >60 >60 mL/min/1.73 m^2    Anion Gap 6 (L) 8 - 16 mmol/L   C-Reactive Protein    Collection Time: 07/02/24  4:02 PM   Result Value Ref Range    .7 (H) 0.0 - 8.2 mg/L   PT/INR    Collection Time: 07/02/24  4:03 PM   Result Value Ref Range    Prothrombin Time 11.4 9.0 - 12.5 sec    INR 1.0 0.8 - 1.2   CBC Auto Differential    Collection Time: 07/02/24  4:03 PM    Result Value Ref Range    WBC 9.10 3.90 - 12.70 K/uL    RBC 3.53 (L) 4.60 - 6.20 M/uL    Hemoglobin 9.4 (L) 14.0 - 18.0 g/dL    Hematocrit 29.9 (L) 40.0 - 54.0 %    MCV 85 82 - 98 fL    MCH 26.6 (L) 27.0 - 31.0 pg    MCHC 31.4 (L) 32.0 - 36.0 g/dL    RDW 13.2 11.5 - 14.5 %    Platelets 277 150 - 450 K/uL    MPV 10.4 9.2 - 12.9 fL    Immature Granulocytes 1.3 (H) 0.0 - 0.5 %    Gran # (ANC) 6.4 1.8 - 7.7 K/uL    Immature Grans (Abs) 0.12 (H) 0.00 - 0.04 K/uL    Lymph # 1.4 1.0 - 4.8 K/uL    Mono # 1.0 0.3 - 1.0 K/uL    Eos # 0.2 0.0 - 0.5 K/uL    Baso # 0.03 0.00 - 0.20 K/uL    nRBC 0 0 /100 WBC    Gran % 70.7 38.0 - 73.0 %    Lymph % 15.4 (L) 18.0 - 48.0 %    Mono % 10.7 4.0 - 15.0 %    Eosinophil % 1.6 0.0 - 8.0 %    Basophil % 0.3 0.0 - 1.9 %    Differential Method Automated    Lactic Acid, Plasma    Collection Time: 07/02/24  4:03 PM   Result Value Ref Range    Lactate (Lactic Acid) 1.0 0.5 - 2.2 mmol/L   Procalcitonin    Collection Time: 07/02/24  4:03 PM   Result Value Ref Range    Procalcitonin 0.27 (H) <0.25 ng/mL   Sedimentation rate    Collection Time: 07/02/24  4:03 PM   Result Value Ref Range    Sed Rate 119 (H) 0 - 23 mm/Hr   Blood culture    Collection Time: 07/02/24  4:03 PM    Specimen: Peripheral, Antecubital, Left; Blood   Result Value Ref Range    Blood Culture, Routine No Growth to date    Blood culture    Collection Time: 07/02/24  4:10 PM    Specimen: Peripheral, Antecubital, Right; Blood   Result Value Ref Range    Blood Culture, Routine No Growth to date    POCT glucose    Collection Time: 07/02/24  7:41 PM   Result Value Ref Range    POCT Glucose 272 (H) 70 - 110 mg/dL   POCT glucose    Collection Time: 07/02/24 11:47 PM   Result Value Ref Range    POCT Glucose 349 (H) 70 - 110 mg/dL   POCT glucose    Collection Time: 07/03/24  4:24 AM   Result Value Ref Range    POCT Glucose 222 (H) 70 - 110 mg/dL   Magnesium    Collection Time: 07/03/24  5:58 AM   Result Value Ref Range    Magnesium 1.8  1.6 - 2.6 mg/dL   Phosphorus    Collection Time: 07/03/24  5:58 AM   Result Value Ref Range    Phosphorus 3.3 2.7 - 4.5 mg/dL   Comprehensive Metabolic Panel    Collection Time: 07/03/24  5:58 AM   Result Value Ref Range    Sodium 132 (L) 136 - 145 mmol/L    Potassium 4.0 3.5 - 5.1 mmol/L    Chloride 99 95 - 110 mmol/L    CO2 26 23 - 29 mmol/L    Glucose 203 (H) 70 - 110 mg/dL    BUN 17 6 - 20 mg/dL    Creatinine 1.0 0.5 - 1.4 mg/dL    Calcium 9.1 8.7 - 10.5 mg/dL    Total Protein 7.4 6.0 - 8.4 g/dL    Albumin 1.6 (L) 3.5 - 5.2 g/dL    Total Bilirubin 0.2 0.1 - 1.0 mg/dL    Alkaline Phosphatase 157 (H) 55 - 135 U/L    AST 10 10 - 40 U/L    ALT 9 (L) 10 - 44 U/L    eGFR >60 >60 mL/min/1.73 m^2    Anion Gap 7 (L) 8 - 16 mmol/L   CBC Auto Differential    Collection Time: 07/03/24  5:59 AM   Result Value Ref Range    WBC 8.75 3.90 - 12.70 K/uL    RBC 3.42 (L) 4.60 - 6.20 M/uL    Hemoglobin 9.1 (L) 14.0 - 18.0 g/dL    Hematocrit 29.9 (L) 40.0 - 54.0 %    MCV 87 82 - 98 fL    MCH 26.6 (L) 27.0 - 31.0 pg    MCHC 30.4 (L) 32.0 - 36.0 g/dL    RDW 13.2 11.5 - 14.5 %    Platelets 278 150 - 450 K/uL    MPV 10.8 9.2 - 12.9 fL    Immature Granulocytes 1.4 (H) 0.0 - 0.5 %    Gran # (ANC) 5.7 1.8 - 7.7 K/uL    Immature Grans (Abs) 0.12 (H) 0.00 - 0.04 K/uL    Lymph # 1.7 1.0 - 4.8 K/uL    Mono # 1.0 0.3 - 1.0 K/uL    Eos # 0.2 0.0 - 0.5 K/uL    Baso # 0.05 0.00 - 0.20 K/uL    nRBC 0 0 /100 WBC    Gran % 65.2 38.0 - 73.0 %    Lymph % 19.3 18.0 - 48.0 %    Mono % 11.0 4.0 - 15.0 %    Eosinophil % 2.5 0.0 - 8.0 %    Basophil % 0.6 0.0 - 1.9 %    Differential Method Automated    POCT glucose    Collection Time: 07/03/24  6:11 AM   Result Value Ref Range    POCT Glucose 235 (H) 70 - 110 mg/dL       Microbiology Results (last 7 days)       Procedure Component Value Units Date/Time    Culture, Anaerobe [[id number]] Collected: 07/03/24 0810    Order Status: Sent Specimen: Wound from Foot, Right Updated: 07/03/24 0821    Aerobic culture  [[id number]] Collected: 07/03/24 0810    Order Status: Sent Specimen: Wound from Foot, Right Updated: 07/03/24 0820    Gram stain [[id number]] Collected: 07/03/24 0810    Order Status: Sent Specimen: Wound from Foot, Right Updated: 07/03/24 0818    Blood culture [[id number]] Collected: 07/02/24 1603    Order Status: Completed Specimen: Blood from Peripheral, Antecubital, Left Updated: 07/02/24 2312     Blood Culture, Routine No Growth to date    Blood culture [[id number]] Collected: 07/02/24 1610    Order Status: Completed Specimen: Blood from Peripheral, Antecubital, Right Updated: 07/02/24 2312     Blood Culture, Routine No Growth to date    Blood culture #1 **CANNOT BE ORDERED STAT** [[id number]] Collected: 07/02/24 0855    Order Status: Completed Specimen: Blood from Peripheral, Antecubital, Right Updated: 07/02/24 1912     Blood Culture, Routine No Growth to date    Blood culture #2 **CANNOT BE ORDERED STAT** [[id number]] Collected: 07/02/24 0855    Order Status: Completed Specimen: Blood from Peripheral, Antecubital, Left Updated: 07/02/24 1912     Blood Culture, Routine No Growth to date    Aerobic culture (Specify Source) **CANNOT BE ORDERED AS STAT** [[id number]] Collected: 07/02/24 0844    Order Status: Sent Specimen: Wound from Leg, Right Updated: 07/02/24 1306    Culture, Body Fluid (Aerobic) w/ GS [[id number]]     Order Status: Canceled Specimen: Body Fluid              Imaging Results               X-Ray Ankle Complete Right (Final result)  Result time 07/02/24 08:13:44      Final result by Fritz Gudino MD (07/02/24 08:13:44)                   Impression:      osteomyelitis of the foot This report was flagged in Epic as abnormal.  The time is it    COMMUNICATION  This critical result was discovered/received at 810.  The critical information above was relayed directly by me by telephone to Dr.Erin Rubio on 07/02/2024 at 08:12      Electronically signed by: Fritz Gudino  MD  Date:    07/02/2024  Time:    08:13               Narrative:    EXAMINATION:  XR ANKLE COMPLETE 3 VIEW RIGHT    CLINICAL HISTORY:  Pain in right ankle and joints of right foot    TECHNIQUE:  AP, lateral, and oblique images of the right ankle were performed.    COMPARISON:  None    FINDINGS:  Radiographs of the ankle show the ankle mortise to be intact.  There is some irregularity of the distal tibia in the region of the medial malleolus that may represent some type of avulsion fracture.    There is soft tissue swelling bony destruction and gas seen in the portions of the foot along the 1st metatarsal that are visualized.  Possible osteomyelitis with sepsis is suggested.                                          Assessment/Plan:      * Acute osteomyelitis of 1st metatarsal bone of right foot  Xray: soft tissue swelling bony destruction and gas seen in the portions of the foot along the 1st metatarsal.   He has had 1st R toe amp previously  Sodium 130. Hb dropped to 15 to 9.4, SED rate 119 & CRP elevated  WBC and Cr in normal limits however  LRINEC score 9, high risk for necrotizing soft tissue infx (>93%)  Vanc + Tomi  ID consult  Podiatry consult  MRI forefoot and midfoot  Control DM better    Benign essential HTN  Hold BP meds in setting of infection and possible anesthetics  SBP goal 140-180 inpt    Type 2 diabetes mellitus with hyperglycemia, without long-term current use of insulin  Patient with uncontrolled DM with glucoses >500 on arrival  Not in DKA, normal pH and no ketones in urine  Needs better control of glucoses, to reduce infections- counseled   He lost his job/insurance, now applying for medicaid   IV 25 U given of Reg insulin  Will start long acting and Mod SSI      Tobacco dependence due to cigarettes  Nicotine patch ordered  4m counseling: The following was discussed concerning tobacco use:  Relevance of Quitting  Risk to Health  Long Term Risk  Risk for Others  Rewards of Quitting  Motivation  Intervention to Quit      VTE Risk Mitigation (From admission, onward)           Ordered     IP VTE HIGH RISK PATIENT  Once         07/02/24 1525     Place sequential compression device  Until discontinued         07/02/24 1525                    Discharge Planning   MAURO:      Code Status: Full Code   Is the patient medically ready for discharge?:     Reason for patient still in hospital (select all that apply): Patient trending condition and Treatment                     Nabeel Jovel MD  Department of Hospital Medicine   [facility]

## 2024-07-03 NOTE — ASSESSMENT & PLAN NOTE
Hany Guerin is a 48 year old man with diabetes and right hallux amputation (2022) who was admitted for right foot infection. He states he has had a wound present since his amputation. He does not follow with wound care or podiatry. MRI foot with midfoot and forefoot cellulitis and osteomyelitis of the 1st metatarsal stump. Wound culture collected with GBS. He has not been on antibiotics recently, not admitted recently, has no water exposures. No risk for MDR or pseudomonal infections. He is clinically stable and has undergone bedside debridement with plan to go to OR today.     Recommendations  - continue vancomycin goal trough 15-20 mcg/ml, dosing per pharmacy  - stop meropenem  - start ceftriaxone 2 grams q24 hours

## 2024-07-03 NOTE — PLAN OF CARE
Patient AAOx4 and able to make needs known. Tele monitoring in place box# 4841. IV abx given as ordered. BS monitored throughout shift. No acute distress noted, patient free from falls or injury this shift.  Bed in low position, wheels locked, call light in reach for assistance, plan of care continued.      Problem: Diabetes Comorbidity  Goal: Blood Glucose Level Within Targeted Range  Outcome: Progressing     Problem: Skin Injury Risk Increased  Goal: Skin Health and Integrity  Outcome: Progressing

## 2024-07-03 NOTE — PT/OT/SLP PROGRESS
Occupational Therapy      Patient Name:  Hany Guerin   MRN:  [id number]    Patient not seen today secondary to Off the floor for I & D .  Will follow-up later as appropriate.    7/3/2024

## 2024-07-03 NOTE — NURSING
[facility], [facility]  Nurses Note -- 4 Eyes      7/2/2024       Skin assessed on: Q Shift      [x] No Pressure Injuries Present    [x]Prevention Measures Documented    [] Yes LDA  for Pressure Injury Previously documented     [] Yes New Pressure Injury Discovered   [] LDA for New Pressure Injury Added      Attending RN:  Tatyana Ramirez LPN     Second RN:  Jen Espinoza RN

## 2024-07-03 NOTE — HPI
Hany Guerin is a 48 year old man with diabetes and right hallux amputation (2022) who was admitted for right foot infection. He states he has had a wound present since his amputation. He does not follow with wound care or podiatry. He presented because he fell while mowing the grass. He always wears shoes when outside (frequently crocs). He does not walk around barefoot. He keeps his foot wrapped. He has not been on antibiotics for multiple years. He does not soak is foot in water or go swimming. He has cats, they have not scratched/bitten him recently. He is on city water. He denies fevers or chills. Penicillin allergy is from childhood, he states he was told he had anaphylaxis but has no memory of it.

## 2024-07-03 NOTE — TRANSFER OF CARE
"Anesthesia Transfer of Care Note    Patient: Hany Guerin    Procedure(s) Performed: Procedure(s) (LRB):  IRRIGATION AND DEBRIDEMENT (Right)    Patient location: PACU    Anesthesia Type: MAC    Transport from OR: Transported from OR on room air with adequate spontaneous ventilation    Post pain: adequate analgesia    Post assessment: no apparent anesthetic complications and tolerated procedure well    Post vital signs: stable    Level of consciousness: awake and alert    Nausea/Vomiting: no nausea/vomiting    Complications: none    Transfer of care protocol was followed      Last vitals: Visit Vitals  /78 (Patient Position: Lying)   Pulse 78   Temp 37 °C (98.6 °F) (Skin)   Resp 18   Ht 5' 6" (1.676 m)   Wt 89.2 kg (196 lb 10.4 oz)   SpO2 99%   BMI 31.74 kg/m²     "

## 2024-07-03 NOTE — HOSPITAL COURSE
Mr Hany Guerin is a 48 y.o. man with uncontrolled Diabetes mellitus (A1c>14, not on treatment) admitted 07/02/2024 for midfoot and forefoot cellulitis and osteomyelitis of the 1st metatarsal stump. Podiatry consulted- pt s/p bedside debridement on 7/3 with culture growing group G strep, surgical bone cultures also growing group G strep. ID consulted-recommends to continue ceftriaxone 2 grams q24 hours x 6 weeks and start PO metronidazole 500 q12 hours for empiric anaerobic coverage. Unfortunately, he does not have insurance. He has applied for medicaid which is pending. He states he must leave the hospital on 7/8/24 for work. Asked if I could write him a note; he stated no thank you. Discussed that I cannot set up outpatient IV antibiotics, wound care, home health with no insurance. Discussed using PO levofloxacin instead of IV ceftriaxone for Group G Strep osteomyelitis. Discussed risk of tendon rupture. Qtc appropriate. He agrees. Plan levofloxacin 750mg QD + flagyl 500mg BID until 8/13/24. Discussed wound care regimen as outlined by Podiatry. Discussed no weight bearing status on right foot. Discussed need for follow up which will need to be at [facility] until insurance comes through. Discussed A1c 14%. He is not actively taking any Rx at home. Prescribed insulin to pharmacy for price checking along with all supplies. He is stable for discharge to home.

## 2024-07-04 LAB
ALBUMIN SERPL BCP-MCNC: 1.5 G/DL (ref 3.5–5.2)
ALP SERPL-CCNC: 161 U/L (ref 55–135)
ALT SERPL W/O P-5'-P-CCNC: 11 U/L (ref 10–44)
ANION GAP SERPL CALC-SCNC: 7 MMOL/L (ref 8–16)
AST SERPL-CCNC: 11 U/L (ref 10–40)
BACTERIA SPEC AEROBE CULT: ABNORMAL
BASOPHILS # BLD AUTO: 0.04 K/UL (ref 0–0.2)
BASOPHILS NFR BLD: 0.5 % (ref 0–1.9)
BILIRUB SERPL-MCNC: 0.2 MG/DL (ref 0.1–1)
BUN SERPL-MCNC: 19 MG/DL (ref 6–20)
CALCIUM SERPL-MCNC: 8.9 MG/DL (ref 8.7–10.5)
CHLORIDE SERPL-SCNC: 98 MMOL/L (ref 95–110)
CO2 SERPL-SCNC: 26 MMOL/L (ref 23–29)
CREAT SERPL-MCNC: 1.2 MG/DL (ref 0.5–1.4)
DIFFERENTIAL METHOD BLD: ABNORMAL
EOSINOPHIL # BLD AUTO: 0.2 K/UL (ref 0–0.5)
EOSINOPHIL NFR BLD: 2.2 % (ref 0–8)
ERYTHROCYTE [DISTWIDTH] IN BLOOD BY AUTOMATED COUNT: 12.9 % (ref 11.5–14.5)
EST. GFR  (NO RACE VARIABLE): >60 ML/MIN/1.73 M^2
GLUCOSE SERPL-MCNC: 293 MG/DL (ref 70–110)
HCT VFR BLD AUTO: 29.6 % (ref 40–54)
HGB BLD-MCNC: 9 G/DL (ref 14–18)
IMM GRANULOCYTES # BLD AUTO: 0.09 K/UL (ref 0–0.04)
IMM GRANULOCYTES NFR BLD AUTO: 1.1 % (ref 0–0.5)
LYMPHOCYTES # BLD AUTO: 1.7 K/UL (ref 1–4.8)
LYMPHOCYTES NFR BLD: 20.5 % (ref 18–48)
MAGNESIUM SERPL-MCNC: 1.7 MG/DL (ref 1.6–2.6)
MCH RBC QN AUTO: 26.3 PG (ref 27–31)
MCHC RBC AUTO-ENTMCNC: 30.4 G/DL (ref 32–36)
MCV RBC AUTO: 87 FL (ref 82–98)
MONOCYTES # BLD AUTO: 1 K/UL (ref 0.3–1)
MONOCYTES NFR BLD: 11.9 % (ref 4–15)
NEUTROPHILS # BLD AUTO: 5.3 K/UL (ref 1.8–7.7)
NEUTROPHILS NFR BLD: 63.8 % (ref 38–73)
NRBC BLD-RTO: 0 /100 WBC
PHOSPHATE SERPL-MCNC: 3 MG/DL (ref 2.7–4.5)
PLATELET # BLD AUTO: 279 K/UL (ref 150–450)
PMV BLD AUTO: 10.5 FL (ref 9.2–12.9)
POCT GLUCOSE: 284 MG/DL (ref 70–110)
POCT GLUCOSE: 312 MG/DL (ref 70–110)
POCT GLUCOSE: 353 MG/DL (ref 70–110)
POCT GLUCOSE: 369 MG/DL (ref 70–110)
POTASSIUM SERPL-SCNC: 4.4 MMOL/L (ref 3.5–5.1)
PROT SERPL-MCNC: 7.1 G/DL (ref 6–8.4)
RBC # BLD AUTO: 3.42 M/UL (ref 4.6–6.2)
SODIUM SERPL-SCNC: 131 MMOL/L (ref 136–145)
WBC # BLD AUTO: 8.3 K/UL (ref 3.9–12.7)

## 2024-07-04 PROCEDURE — 11000001 HC ACUTE MED/SURG PRIVATE ROOM

## 2024-07-04 PROCEDURE — 63600175 PHARM REV CODE 636 W HCPCS: Performed by: STUDENT IN AN ORGANIZED HEALTH CARE EDUCATION/TRAINING PROGRAM

## 2024-07-04 PROCEDURE — 94761 N-INVAS EAR/PLS OXIMETRY MLT: CPT

## 2024-07-04 PROCEDURE — 25000003 PHARM REV CODE 250: Performed by: STUDENT IN AN ORGANIZED HEALTH CARE EDUCATION/TRAINING PROGRAM

## 2024-07-04 PROCEDURE — 97110 THERAPEUTIC EXERCISES: CPT

## 2024-07-04 PROCEDURE — 84100 ASSAY OF PHOSPHORUS: CPT | Performed by: STUDENT IN AN ORGANIZED HEALTH CARE EDUCATION/TRAINING PROGRAM

## 2024-07-04 PROCEDURE — 36415 COLL VENOUS BLD VENIPUNCTURE: CPT | Performed by: STUDENT IN AN ORGANIZED HEALTH CARE EDUCATION/TRAINING PROGRAM

## 2024-07-04 PROCEDURE — 80053 COMPREHEN METABOLIC PANEL: CPT | Performed by: STUDENT IN AN ORGANIZED HEALTH CARE EDUCATION/TRAINING PROGRAM

## 2024-07-04 PROCEDURE — 83735 ASSAY OF MAGNESIUM: CPT | Performed by: STUDENT IN AN ORGANIZED HEALTH CARE EDUCATION/TRAINING PROGRAM

## 2024-07-04 PROCEDURE — 97165 OT EVAL LOW COMPLEX 30 MIN: CPT

## 2024-07-04 PROCEDURE — 85025 COMPLETE CBC W/AUTO DIFF WBC: CPT | Performed by: STUDENT IN AN ORGANIZED HEALTH CARE EDUCATION/TRAINING PROGRAM

## 2024-07-04 RX ORDER — INSULIN ASPART 100 [IU]/ML
6 INJECTION, SOLUTION INTRAVENOUS; SUBCUTANEOUS
Status: DISCONTINUED | OUTPATIENT
Start: 2024-07-04 | End: 2024-07-06

## 2024-07-04 RX ORDER — INSULIN GLARGINE 100 [IU]/ML
18 INJECTION, SOLUTION SUBCUTANEOUS 2 TIMES DAILY
Status: DISCONTINUED | OUTPATIENT
Start: 2024-07-04 | End: 2024-07-06

## 2024-07-04 RX ORDER — OXYCODONE HYDROCHLORIDE 5 MG/1
5 TABLET ORAL EVERY 4 HOURS PRN
Status: DISCONTINUED | OUTPATIENT
Start: 2024-07-04 | End: 2024-07-08 | Stop reason: HOSPADM

## 2024-07-04 RX ORDER — ACETAMINOPHEN 500 MG
1000 TABLET ORAL EVERY 8 HOURS
Status: DISCONTINUED | OUTPATIENT
Start: 2024-07-04 | End: 2024-07-08 | Stop reason: HOSPADM

## 2024-07-04 RX ORDER — HYDROMORPHONE HYDROCHLORIDE 1 MG/ML
0.5 INJECTION, SOLUTION INTRAMUSCULAR; INTRAVENOUS; SUBCUTANEOUS
Status: DISCONTINUED | OUTPATIENT
Start: 2024-07-04 | End: 2024-07-08

## 2024-07-04 RX ADMIN — VANCOMYCIN HYDROCHLORIDE 1250 MG: 1.25 INJECTION, POWDER, LYOPHILIZED, FOR SOLUTION INTRAVENOUS at 09:07

## 2024-07-04 RX ADMIN — METRONIDAZOLE 500 MG: 500 TABLET ORAL at 06:07

## 2024-07-04 RX ADMIN — INSULIN ASPART 5 UNITS: 100 INJECTION, SOLUTION INTRAVENOUS; SUBCUTANEOUS at 04:07

## 2024-07-04 RX ADMIN — GABAPENTIN 100 MG: 100 CAPSULE ORAL at 02:07

## 2024-07-04 RX ADMIN — INSULIN ASPART 6 UNITS: 100 INJECTION, SOLUTION INTRAVENOUS; SUBCUTANEOUS at 04:07

## 2024-07-04 RX ADMIN — INSULIN GLARGINE 18 UNITS: 100 INJECTION, SOLUTION SUBCUTANEOUS at 09:07

## 2024-07-04 RX ADMIN — INSULIN ASPART 6 UNITS: 100 INJECTION, SOLUTION INTRAVENOUS; SUBCUTANEOUS at 11:07

## 2024-07-04 RX ADMIN — INSULIN ASPART 6 UNITS: 100 INJECTION, SOLUTION INTRAVENOUS; SUBCUTANEOUS at 06:07

## 2024-07-04 RX ADMIN — ACETAMINOPHEN 1000 MG: 500 TABLET, FILM COATED ORAL at 09:07

## 2024-07-04 RX ADMIN — ACETAMINOPHEN 1000 MG: 500 TABLET, FILM COATED ORAL at 06:07

## 2024-07-04 RX ADMIN — CEFTRIAXONE 2 G: 2 INJECTION, POWDER, FOR SOLUTION INTRAMUSCULAR; INTRAVENOUS at 02:07

## 2024-07-04 RX ADMIN — GABAPENTIN 100 MG: 100 CAPSULE ORAL at 09:07

## 2024-07-04 RX ADMIN — INSULIN ASPART 5 UNITS: 100 INJECTION, SOLUTION INTRAVENOUS; SUBCUTANEOUS at 11:07

## 2024-07-04 RX ADMIN — Medication 6 MG: at 09:07

## 2024-07-04 RX ADMIN — INSULIN GLARGINE 18 UNITS: 100 INJECTION, SOLUTION SUBCUTANEOUS at 06:07

## 2024-07-04 RX ADMIN — INSULIN ASPART 2 UNITS: 100 INJECTION, SOLUTION INTRAVENOUS; SUBCUTANEOUS at 09:07

## 2024-07-04 RX ADMIN — ACETAMINOPHEN 1000 MG: 500 TABLET, FILM COATED ORAL at 02:07

## 2024-07-04 NOTE — NURSING
[facility] [facility] [place] -- 4 Eyes      7/3/2024       Skin assessed on: Q Shift      [x] No Pressure Injuries Present    [x]Prevention Measures Documented    [] Yes LDA  for Pressure Injury Previously documented     [] Yes New Pressure Injury Discovered   [] LDA for New Pressure Injury Added      Attending RN:  Tatyana Ramirez LPN     Second RN:  Jen Espinoza RN

## 2024-07-04 NOTE — PLAN OF CARE
No acute distress noted, patient free from falls or injury this shift.  Bed in low position, wheels locked, call light in reach for assistance, plan of care continued.      Problem: Diabetes Comorbidity  Goal: Blood Glucose Level Within Targeted Range  Outcome: Progressing     Problem: Wound  Goal: Optimal Coping  Outcome: Progressing  Goal: Optimal Functional Ability  Outcome: Progressing

## 2024-07-04 NOTE — PROGRESS NOTES
Awaiting [facility] lab draw and result; once timed and due 7/5 at 0900.  Patient's scr is stable.  Will continue to monitor.  Thanks for the consult.

## 2024-07-04 NOTE — PROGRESS NOTES
[facility] Medicine  Progress Note    Patient Name: Hany Guerin  MRN: [id number]  Patient Class: IP- Inpatient   Admission Date: 7/2/2024  Length of Stay: 2 days  Attending Physician: Nabeel Jovel MD  Primary Care Provider: Lyle Thorne MD        Subjective:     Principal Problem:Acute osteomyelitis of metatarsal bone of right foot        HPI:    Hany Guerin is a 48 y.o. male who has a past medical history of Diabetes mellitus, High cholesterol, and Hypertension, presented to the ED with CC of Ankle Pain after fall.       No fracture or significant pathology was discovered of his ankle other than potential avascular necrosis of distal tibia; however, Xray did show soft tissue swelling bony destruction and gas seen in the portions of the foot along the 1st metatarsal. Patient has had long langford with osteomyelitis in the past of the right foot. He has had 1st R toe amp previously. Does still have drainage from foot, constantly. He has not been to wound care in over a year. Has not been on antibiotics for 2 years. And is not taking any insulin and his glucose is >500 on admission. He is not in DKA; no ketones in urine and pH wnl. Sodium 130. Hb dropped to 15 to 9.4, SED rate 119 & CRP elevated, WBC and Cr in normal limits however (LRINEC score 9). Podiatry and ID consulted. PEN allergy, anaphylaxis. Started on Vanc+Tomi.         Overview/Hospital Course:  Admitted after fall and sprained ankle, but found to have acute on chronic OM of 1st metatarsal base.   and . V+Tomi. Podiatry and ID consulted. Procedure 7/3/24. S/p debridement. Growing GGS in two different samples. Will stop flagyl, continue V+Ceft for now, ID back on tomorrow. May need to see if margins are clear, but may be able to get set up with antibiotics and discharge, and f/u outpt for margin results- will discuss.        Review of Systems   Genitourinary:  Negative for difficulty urinating, dysuria  and hematuria.   Musculoskeletal:  Positive for arthralgias, gait problem and joint swelling.   Skin:  Positive for color change and wound. Negative for rash.     Objective:      Vitals:    07/04/24 0444 07/04/24 0703 07/04/24 0812 07/04/24 0820   BP: 118/66 (!) 141/79     BP Location: Left arm      Patient Position: Lying Lying     Pulse: 87 85 81 83   Resp: 18 18  17   Temp: 99.6 °F (37.6 °C) 97.7 °F (36.5 °C)     TempSrc: Oral Oral     SpO2: 96% 96%  96%   Weight:       Height:           Body mass index is 31.74 kg/m².     Physical Exam  Vitals and nursing note reviewed.   Constitutional:       General: He is not in acute distress.     Appearance: He is well-developed. He is obese. He is not ill-appearing or diaphoretic.   HENT:      Head: Normocephalic and atraumatic.   Eyes:      General: No scleral icterus.     Pupils: Pupils are equal, round, and reactive to light.   Neck:      Thyroid: No thyromegaly.   Cardiovascular:      Rate and Rhythm: Normal rate and regular rhythm.      Heart sounds: No murmur heard.  Pulmonary:      Effort: Pulmonary effort is normal.      Breath sounds: Normal breath sounds. No stridor. No wheezing or rales.   Abdominal:      General: There is no distension.      Palpations: Abdomen is soft.      Tenderness: There is no guarding.   Musculoskeletal:         General: Swelling and deformity present. Normal range of motion.      Cervical back: Normal range of motion.      Right lower leg: Edema present.      Left lower leg: No edema.   Skin:     General: Skin is warm.      Capillary Refill: Capillary refill takes less than 2 seconds.      Findings: Lesion present.   Neurological:      Mental Status: He is alert and oriented to person, place, and time.      Cranial Nerves: No cranial nerve deficit.      Motor: No weakness.   Psychiatric:         Mood and Affect: Mood normal.         Behavior: Behavior normal.         Thought Content: Thought content normal.         Judgment: Judgment  normal.       Recent Results (from the past 24 hour(s))   POCT glucose    Collection Time: 07/03/24 10:52 AM   Result Value Ref Range    POCT Glucose 223 (H) 70 - 110 mg/dL   Aerobic culture    Collection Time: 07/03/24  1:20 PM    Specimen: Foot, Right; Bone   Result Value Ref Range    Aerobic Bacterial Culture No growth    POCT glucose    Collection Time: 07/03/24  1:42 PM   Result Value Ref Range    POCT Glucose 272 (H) 70 - 110 mg/dL   POCT glucose    Collection Time: 07/03/24  3:49 PM   Result Value Ref Range    POCT Glucose 211 (H) 70 - 110 mg/dL   POCT glucose    Collection Time: 07/03/24  8:19 PM   Result Value Ref Range    POCT Glucose 282 (H) 70 - 110 mg/dL   VANCOMYCIN, TROUGH    Collection Time: 07/03/24  9:06 PM   Result Value Ref Range    Vancomycin-Trough 15.9 10.0 - 22.0 ug/mL   Magnesium    Collection Time: 07/04/24  4:52 AM   Result Value Ref Range    Magnesium 1.7 1.6 - 2.6 mg/dL   Phosphorus    Collection Time: 07/04/24  4:52 AM   Result Value Ref Range    Phosphorus 3.0 2.7 - 4.5 mg/dL   Comprehensive Metabolic Panel    Collection Time: 07/04/24  4:52 AM   Result Value Ref Range    Sodium 131 (L) 136 - 145 mmol/L    Potassium 4.4 3.5 - 5.1 mmol/L    Chloride 98 95 - 110 mmol/L    CO2 26 23 - 29 mmol/L    Glucose 293 (H) 70 - 110 mg/dL    BUN 19 6 - 20 mg/dL    Creatinine 1.2 0.5 - 1.4 mg/dL    Calcium 8.9 8.7 - 10.5 mg/dL    Total Protein 7.1 6.0 - 8.4 g/dL    Albumin 1.5 (L) 3.5 - 5.2 g/dL    Total Bilirubin 0.2 0.1 - 1.0 mg/dL    Alkaline Phosphatase 161 (H) 55 - 135 U/L    AST 11 10 - 40 U/L    ALT 11 10 - 44 U/L    eGFR >60 >60 mL/min/1.73 m^2    Anion Gap 7 (L) 8 - 16 mmol/L   CBC Auto Differential    Collection Time: 07/04/24  4:52 AM   Result Value Ref Range    WBC 8.30 3.90 - 12.70 K/uL    RBC 3.42 (L) 4.60 - 6.20 M/uL    Hemoglobin 9.0 (L) 14.0 - 18.0 g/dL    Hematocrit 29.6 (L) 40.0 - 54.0 %    MCV 87 82 - 98 fL    MCH 26.3 (L) 27.0 - 31.0 pg    MCHC 30.4 (L) 32.0 - 36.0 g/dL    RDW  12.9 11.5 - 14.5 %    Platelets 279 150 - 450 K/uL    MPV 10.5 9.2 - 12.9 fL    Immature Granulocytes 1.1 (H) 0.0 - 0.5 %    Gran # (ANC) 5.3 1.8 - 7.7 K/uL    Immature Grans (Abs) 0.09 (H) 0.00 - 0.04 K/uL    Lymph # 1.7 1.0 - 4.8 K/uL    Mono # 1.0 0.3 - 1.0 K/uL    Eos # 0.2 0.0 - 0.5 K/uL    Baso # 0.04 0.00 - 0.20 K/uL    nRBC 0 0 /100 WBC    Gran % 63.8 38.0 - 73.0 %    Lymph % 20.5 18.0 - 48.0 %    Mono % 11.9 4.0 - 15.0 %    Eosinophil % 2.2 0.0 - 8.0 %    Basophil % 0.5 0.0 - 1.9 %    Differential Method Automated    POCT glucose    Collection Time: 07/04/24  7:03 AM   Result Value Ref Range    POCT Glucose 284 (H) 70 - 110 mg/dL       Microbiology Results (last 7 days)       Procedure Component Value Units Date/Time    Aerobic culture [[id number]] Collected: 07/03/24 1320    Order Status: Completed Specimen: Bone from Foot, Right Updated: 07/04/24 0826     Aerobic Bacterial Culture No growth    Aerobic culture [[id number]]  (Abnormal) Collected: 07/03/24 0810    Order Status: Completed Specimen: Wound from Foot, Right Updated: 07/04/24 0752     Aerobic Bacterial Culture STREPTOCOCCUS GROUP G  Moderate  Beta-hemolytic streptococci are routinely susceptible to   penicillins,cephalosporins and carbapenems.      Aerobic culture (Specify Source) **CANNOT BE ORDERED AS STAT** [[id number]]  (Abnormal) Collected: 07/02/24 0844    Order Status: Completed Specimen: Wound from Leg, Right Updated: 07/04/24 0625     Aerobic Bacterial Culture STREPTOCOCCUS GROUP G  Moderate  Beta-hemolytic streptococci are routinely susceptible to   penicillins,cephalosporins and carbapenems.      Blood culture [[id number]] Collected: 07/02/24 1603    Order Status: Completed Specimen: Blood from Peripheral, Antecubital, Left Updated: 07/03/24 1903     Blood Culture, Routine No Growth to date      No Growth to date    Blood culture [[id number]] Collected: 07/02/24 1610    Order Status: Completed Specimen: Blood from Peripheral,  Antecubital, Right Updated: 07/03/24 1903     Blood Culture, Routine No Growth to date      No Growth to date    Culture, Anaerobe [[id number]] Collected: 07/03/24 1320    Order Status: Sent Specimen: Bone from Foot, Right Updated: 07/03/24 1803    AFB Culture & Smear [[id number]] Collected: 07/03/24 1320    Order Status: Sent Specimen: Bone from Foot, Right Updated: 07/03/24 1802    Fungus culture [[id number]] Collected: 07/03/24 1320    Order Status: Sent Specimen: Bone from Foot, Right Updated: 07/03/24 1801    Blood culture #1 **CANNOT BE ORDERED STAT** [[id number]] Collected: 07/02/24 0855    Order Status: Completed Specimen: Blood from Peripheral, Antecubital, Right Updated: 07/03/24 1503     Blood Culture, Routine No Growth to date      No Growth to date    Blood culture #2 **CANNOT BE ORDERED STAT** [[id number]] Collected: 07/02/24 0855    Order Status: Completed Specimen: Blood from Peripheral, Antecubital, Left Updated: 07/03/24 1503     Blood Culture, Routine No Growth to date      No Growth to date    Gram stain [[id number]] Collected: 07/03/24 0810    Order Status: Completed Specimen: Wound from Foot, Right Updated: 07/03/24 1006     Gram Stain Result Moderate WBC's      Moderate Gram positive cocci      Few Gram negative rods    Culture, Anaerobe [[id number]] Collected: 07/03/24 0810    Order Status: Sent Specimen: Wound from Foot, Right Updated: 07/03/24 0821    Culture, Body Fluid (Aerobic) w/ GS [[id number]]     Order Status: Canceled Specimen: Body Fluid              Imaging Results               X-Ray Ankle Complete Right (Final result)  Result time 07/02/24 08:13:44      Final result by Fritz Gudino MD (07/02/24 08:13:44)                   Impression:      osteomyelitis of the foot This report was flagged in Epic as abnormal.  The time is it    COMMUNICATION  This critical result was discovered/received at 810.  The critical information above was relayed directly by me by telephone to  Dr.Erin Rubio on 07/02/2024 at 08:12      Electronically signed by: Fritz Gudino MD  Date:    07/02/2024  Time:    08:13               Narrative:    EXAMINATION:  XR ANKLE COMPLETE 3 VIEW RIGHT    CLINICAL HISTORY:  Pain in right ankle and joints of right foot    TECHNIQUE:  AP, lateral, and oblique images of the right ankle were performed.    COMPARISON:  None    FINDINGS:  Radiographs of the ankle show the ankle mortise to be intact.  There is some irregularity of the distal tibia in the region of the medial malleolus that may represent some type of avulsion fracture.    There is soft tissue swelling bony destruction and gas seen in the portions of the foot along the 1st metatarsal that are visualized.  Possible osteomyelitis with sepsis is suggested.                                          Assessment/Plan:      * Acute osteomyelitis of 1st metatarsal bone of right foot  Xray: soft tissue swelling bony destruction and gas seen in the portions of the foot along the 1st metatarsal.   He has had 1st R toe amp previously  Sodium 130. Hb dropped to 15 to 9.4, SED rate 119 & CRP elevated  WBC and Cr in normal limits however  LRINEC score 9, high risk for necrotizing soft tissue infx (>93%)  Vanc + Tomi  ID consult  Podiatry consult  MRI forefoot and midfoot  Control DM better    Benign essential HTN  Hold BP meds in setting of infection and possible anesthetics  SBP goal 140-180 inpt    Type 2 diabetes mellitus with hyperglycemia, without long-term current use of insulin  Patient with uncontrolled DM with glucoses >500 on arrival  Not in DKA, normal pH and no ketones in urine  Needs better control of glucoses, to reduce infections- counseled   He lost his job/insurance, now applying for medicaid   IV 25 U given of Reg insulin  Will start long acting and Mod SSI      Tobacco dependence due to cigarettes  Nicotine patch ordered  4m counseling: The following was discussed concerning tobacco use:  Relevance of  Quitting  Risk to Health  Long Term Risk  Risk for Others  Rewards of Quitting  Motivation Intervention to Quit      VTE Risk Mitigation (From admission, onward)           Ordered     IP VTE HIGH RISK PATIENT  Once         07/02/24 1525     Place sequential compression device  Until discontinued         07/02/24 1525                    Discharge Planning   MAURO:      Code Status: Full Code   Is the patient medically ready for discharge?:     Reason for patient still in hospital (select all that apply): Patient trending condition and Treatment                     Nabeel Jovel MD  Department of Hospital Medicine   [facility]

## 2024-07-04 NOTE — PT/OT/SLP EVAL
Occupational Therapy   Evaluation and Discharge Note    Name: Hany Guerin  MRN: [id number]  Admitting Diagnosis: Acute osteomyelitis of metatarsal bone of right foot  Recent Surgery: Procedure(s) (LRB):  IRRIGATION AND DEBRIDEMENT (Right) 1 Day Post-Op    Recommendations:     Discharge Recommendations: Low Intensity Therapy  Discharge Equipment Recommendations: walker, rolling  Barriers to discharge:   (condition trend)    Assessment:     Hany Guerin is a 48 y.o. male with a medical diagnosis of Acute osteomyelitis of metatarsal bone of right foot. At this time, patient is functioning at their prior level of function and does not require further acute OT services.     Plan:     During this hospitalization, patient does not require further acute OT services.  Please re-consult if situation changes.    Plan of Care Reviewed with: patient    Subjective     Chief Complaint: Sudden onset R ankle wound needs discovered while cutting own grass at home.   Patient/Family Comments/goals: Return to own home at PLOF,     Occupational Profile:  Living Environment: [facility] with Spouse, no steps to enter, walk in shower, (I) all ADLs and home management, (+) driving. Spouse well, Both active.   Equipment Used at home: none  Assistance upon Discharge: Self, Spouse.     Pain/Comfort:  Pain Rating 1: 0/10  Pain Addressed 1: Pre-medicate for activity    Patients cultural, spiritual, Alevism conflicts given the current situation: no    Objective:     Communicated with: RN prior to session.  Patient found  semi recumbent, IV being disconnected for transport to US  with telemetry upon OT entry to room.    General Precautions: Standard,  (cam boot, partial heel WB)  Orthopedic Precautions: RUE partial weight bearing (heel WB)  Braces:  (CAM boot)  Respiratory Status: Room air     Occupational Performance:    Bed Mobility:    Patient completed Rolling/Turning to Left with  independence  Patient completed Scooting/Bridging  with independence  Patient completed Supine to Sit with independence    Functional Mobility/Transfers:  Patient completed Sit <> Stand Transfer with modified independence  with  rolling walker   Patient completed Bed <> Chair Transfer using Step Transfer technique with modified independence with rolling walker  Patient completed Toilet Transfer Step Transfer technique with modified independence with  rolling walker  Functional Mobility: Patient transferring self volitionally and well with use of R/W, cam boot in place, precaution adherence good at that time for bed to toilet to transport chair for off unit needs.     Activities of Daily Living:  Grooming: mod (I) 2* cam boot and r/w, independence standing a basin   Upper Body Dressing:(I) gown  Lower Body Dressing: SBA cam boot, no undergarments.   Toileting: (I) in stand for bladder management prior to departure for US.       Cognitive/Visual Perceptual:Intact, A+OX4, able to follow complex direction, able to make complex needs known, congruent information integration at time of eval. Patient cooperative / active in own POC.  Upper Body Physical Exam:  RESPIRATORY: non-labored / normal effort / rate. (-) accessory MM engagement.    UB SKIN: Observable UB skin warm and dry.   Sensation: Norm in UB extremities light touch/localization.  Posture: Norm    BUE AROM WNL  BUE MMT  WNL  UB GM/FM coordination WNL  Good (-) dynamic standing bedside  (-) UB edema      Treatment & Education:  Discussed role of OT,eval and collaborative POC, discharge establishment completed. Patient states understanding and agreement with all herein.   Interventions:   UE ROM/MMT assessment  Bed mobility training / assessment  Functional mobility assessment  Sit/standing balance assessment  Educated on importance of sitting OOB in bedside chair to promote increased strength, endurance & proper breathing.  Intro UB seated  AROM constructs BUE all joints, all planes seated () 1x10, x3 daily  recommended for (I) ex as suggested to counter (-) effects of limited mobility inherent in acute setting. Pecs/lats, and core strengthening ex for pull/push all planes EOB sitting undertaken.        [facility] 6 Click ADL:  AMPAC Total Score: 23      Patient left  in transport w/c with US attendant , RN informed.    GOALS:   Multidisciplinary Problems       Occupational Therapy Goals       Not on file                    History:     Past Medical History:   Diagnosis Date    Diabetes mellitus     High cholesterol     Hypertension          Past Surgical History:   Procedure Laterality Date    CHOLECYSTECTOMY         Time Tracking:     OT Date of Treatment: 07/04/24  OT Start Time: 1031  OT Stop Time: 1055  OT Total Time (min): 24 min    Billable Minutes:Evaluation 15  Therapeutic Exercise 9    7/4/2024

## 2024-07-04 NOTE — PROGRESS NOTES
[facility] Surg  Podiatry  Progress Note      Subjective:     Interval History:  Patient 1 day s/p R foot I&D.  VSS, Afebrile, WBC WNL.  Patient doing well and is in good spirits he has remained NWB be since surgery.  Daughter also bedside.  He reports some this R foot that is tolerable with pain medications.  Denies fevers, chills nausea, vomiting.  Denies any new pedal complaints.    Scheduled Meds:   acetaminophen  1,000 mg Oral Q8H    cefTRIAXone (Rocephin) IV (PEDS and ADULTS)  2 g Intravenous Q24H    gabapentin  100 mg Oral TID    insulin aspart U-100  0-5 Units Subcutaneous QID (AC & HS)    insulin aspart U-100  6 Units Subcutaneous TIDWM    insulin glargine U-100  18 Units Subcutaneous BID    melatonin  6 mg Oral Nightly    nicotine  1 patch Transdermal Daily    vancomycin (VANCOCIN) IV (PEDS and ADULTS)  1,250 mg Intravenous Q12H     Continuous Infusions:  PRN Meds:  Current Facility-Administered Medications:     glucagon (human recombinant), 1 mg, Intramuscular, PRN    glucagon (human recombinant), 1 mg, Intramuscular, PRN    glucose, 16 g, Oral, PRN    glucose, 24 g, Oral, PRN    HYDROmorphone, 0.5 mg, Intravenous, Q3H PRN    naloxone, 0.02 mg, Intravenous, PRN    oxyCODONE, 5 mg, Oral, Q4H PRN    polyethylene glycol, 17 g, Oral, BID PRN    Pharmacy to dose Vancomycin consult, , , Once **AND** vancomycin - pharmacy to dose, , Intravenous, pharmacy to manage frequency    Review of patient's allergies indicates:   Allergen Reactions    Penicillins Anaphylaxis        Past Medical History:   Diagnosis Date    Diabetes mellitus     High cholesterol     Hypertension      Past Surgical History:   Procedure Laterality Date    CHOLECYSTECTOMY         Family History       Problem Relation (Age of Onset)    Diabetes Mother, Maternal Grandfather, Paternal Grandfather    Hypertension Mother, Father, Maternal Grandfather, Paternal Grandfather    Miscarriages / Stillbirths Paternal Grandmother          Tobacco Use     Smoking status: Every Day     Current packs/day: 1.00     Average packs/day: 1 pack/day for 30.0 years (30.0 ttl pk-yrs)     Types: Cigarettes     Start date: 7/12/1994    Smokeless tobacco: Never   Substance and Sexual Activity    Alcohol use: Yes     Alcohol/week: 3.0 - 4.0 standard drinks of alcohol     Types: 3 - 4 Cans of beer per week     Comment: Daily    Drug use: Yes     Frequency: 5.0 times per week     Types: Marijuana    Sexual activity: Yes     Partners: Female     Review of Systems   Constitutional:  Positive for activity change.   Respiratory:  Negative for shortness of breath.    Cardiovascular:  Negative for chest pain and leg swelling.   Gastrointestinal:  Negative for nausea and vomiting.   Musculoskeletal:  Positive for arthralgias.   Skin:  Positive for wound.   Neurological:  Positive for numbness. Negative for weakness.   Psychiatric/Behavioral: Negative.       Objective:     Vital Signs (Most Recent):  Temp: 98 °F (36.7 °C) (07/04/24 1152)  Pulse: 83 (07/04/24 1152)  Resp: 18 (07/04/24 1152)  BP: 132/74 (07/04/24 1152)  SpO2: 97 % (07/04/24 1152) Vital Signs (24h Range):  Temp:  [97.7 °F (36.5 °C)-102.7 °F (39.3 °C)] 98 °F (36.7 °C)  Pulse:  [] 83  Resp:  [17-18] 18  SpO2:  [95 %-97 %] 97 %  BP: (118-164)/(66-80) 132/74     Weight: 89.2 kg (196 lb 10.4 oz)  Body mass index is 31.74 kg/m².    Foot Exam    General  Orientation: alert and oriented to person, place, and time       Right Foot/Ankle     Inspection and Palpation  Skin Exam: ulcer;     Neurovascular  Dorsalis pedis: 1+  Posterior tibial: 1+  Saphenous nerve sensation: diminished  Tibial nerve sensation: diminished  Superficial peroneal nerve sensation: diminished  Deep peroneal nerve sensation: diminished  Sural nerve sensation: diminished      Left Foot/Ankle      Inspection and Palpation  Skin Exam: no ulcer     Neurovascular  Dorsalis pedis: 1+  Posterior tibial: 1+  Saphenous nerve sensation: diminished  Tibial nerve  sensation: diminished  Superficial peroneal nerve sensation: diminished  Deep peroneal nerve sensation: diminished  Sural nerve sensation: diminished        7/3/24:  Pre debridement  wound with purulent drainage, probe to bone.     Post debridement       7/4:        S/p OR I&D.  Wound base with granular tissue.  Scant sanguinous drainage with manual expression.  No tenderness to palpation.  No malodor noted.  Appropriate post-op swelling and erythema noted.    Laboratory:  CBC:   Recent Labs   Lab 07/04/24  0452   WBC 8.30   RBC 3.42*   HGB 9.0*   HCT 29.6*      MCV 87   MCH 26.3*   MCHC 30.4*     CMP:   Recent Labs   Lab 07/04/24  0452   *   CALCIUM 8.9   ALBUMIN 1.5*   PROT 7.1   *   K 4.4   CO2 26   CL 98   BUN 19   CREATININE 1.2   ALKPHOS 161*   ALT 11   AST 11   BILITOT 0.2       Diagnostic Results:  Xray: X-Ray Foot Complete Right  Order: [id number]  Status: Final result       Visible to patient: Yes (not seen)       Next appt: None       Dx: Other acute osteomyelitis of right foot    0 Result Notes  Details    Reading Physician Reading Date Result Priority   Cristian Newberry MD  [phone]  [phone] 7/3/2024 Routine     Narrative & Impression  EXAMINATION:  XR FOOT COMPLETE 3 VIEW RIGHT     CLINICAL HISTORY:  wound right foot;. Other acute osteomyelitis, right ankle and foot     TECHNIQUE:  3 views of the right foot     COMPARISON:  Right foot radiographs 07/22/2019     FINDINGS:  Postoperative changes of transmetatarsal amputation of the 1st digit with acute osseous erosive changes suspected throughout the residual 1st metatarsal none surrounding subcutaneous emphysema.     Impression:     1. Postoperative changes of transmetatarsal amputation of the 1st digit with acute osseous erosive changes suspected throughout the residual 1st metatarsal none surrounding subcutaneous emphysema concerning for acute osteomyelitis.  This report was flagged in Epic as abnormal.     Clinical  Findings:  Right foot ulceration with purulent drainage.     Assessment/Plan:     Active Diagnoses:    Diagnosis Date Noted POA    PRINCIPAL PROBLEM:  Acute osteomyelitis of 1st metatarsal bone of right foot [M86.171] 07/02/2024 Yes    Type 2 diabetes mellitus with hyperglycemia, without long-term current use of insulin [E11.65] 07/20/2022 Yes    Tobacco dependence due to cigarettes [F17.210] 07/20/2022 Yes    Benign essential HTN [I10] 07/20/2022 Yes      Problems Resolved During this Admission:     Patient now 1 day s/p R foot wound incision, drainage, and debridement.  Wound values with granular tissue no purulent drainage or malodor noted.  Scant sanguinous drainage noted with manual expression.  Healing appropriately.    Patient to remain NWB in long cam boot.    Intra op cultures pending.  Antibiotics per primary team.    R foot dressed with Betadine-soaked gauze, dry gauze, Kerlix, and ACE wrap.  Along cam boot placed back on patient's R foot.    All other care per primary.    Thank you for your consult. I will follow-up with patient. Please contact us if you have any additional questions.    Mag Alcantar DPM  Podiatry  [facility] - [place] - Med Surg

## 2024-07-04 NOTE — PROGRESS NOTES
Pharmacokinetic Assessment Follow Up: IV Vancomycin    Vancomycin serum concentration assessment(s):    The trough level was drawn correctly and can be used to guide therapy at this time. The measurement is within the desired definitive target range of 10 to 20 mcg/mL.    Vancomycin Regimen Plan:    Continue regimen to Vancomycin 1250 mg IV every 12 hours with next serum trough concentration measured at 0900 prior to fourth dose on 7/5    Drug levels (last 3 results):  Recent Labs   Lab Result Units 07/03/24  2106   Vancomycin-Trough ug/mL 15.9       Pharmacy will continue to follow and monitor vancomycin.    Please contact pharmacy at extension [phone] for questions regarding this assessment.    Thank you for the consult,   Josh Looney       Patient brief summary:  Hany Guerin is a 48 y.o. male initiated on antimicrobial therapy with IV Vancomycin for treatment of skin & soft tissue infection    Drug Allergies:   Review of patient's allergies indicates:   Allergen Reactions    Penicillins Anaphylaxis       Actual Body Weight:   89.2 kg    Renal Function:   Estimated Creatinine Clearance: 95.6 mL/min (based on SCr of 1 mg/dL).,     Dialysis Method (if applicable):  N/A    CBC (last 72 hours):  Recent Labs   Lab Result Units 07/02/24  1602 07/02/24  1603 07/03/24  0559   WBC K/uL  --  9.10 8.75   Hemoglobin g/dL  --  9.4* 9.1*   Hemoglobin A1C % >14.0*  --   --    Hematocrit %  --  29.9* 29.9*   Platelets K/uL  --  277 278   Gran % %  --  70.7 65.2   Lymph % %  --  15.4* 19.3   Mono % %  --  10.7 11.0   Eosinophil % %  --  1.6 2.5   Basophil % %  --  0.3 0.6   Differential Method   --  Automated Automated       Metabolic Panel (last 72 hours):  Recent Labs   Lab Result Units 07/02/24  1602 07/03/24  0558   Sodium mmol/L 133* 132*   Potassium mmol/L 4.9 4.0   Chloride mmol/L 99 99   CO2 mmol/L 28 26   Glucose mg/dL 192* 203*   BUN mg/dL 13 17   Creatinine mg/dL 1.0 1.0   Albumin g/dL 1.9* 1.6*   Total  Bilirubin mg/dL 0.5 0.2   Alkaline Phosphatase U/L 162* 157*   AST U/L 15 10   ALT U/L 11 9*   Magnesium mg/dL 1.8 1.8   Phosphorus mg/dL 3.5 3.3       Vancomycin Administrations:  vancomycin given in the last 96 hours                     vancomycin 1,250 mg in D5W 250 mL IVPB (Vial-Mate) (mg) 1,250 mg New Bag 07/03/24 2224     1,250 mg New Bag  1000     1,250 mg New Bag 07/02/24 2119    vancomycin (VANCOCIN) 2,000 mg in D5W 500 mL IVPB (mg) 2,000 mg New Bag 07/02/24 1009                    Microbiologic Results:  Microbiology Results (last 7 days)       Procedure Component Value Units Date/Time    Blood culture [[id number]] Collected: 07/02/24 1603    Order Status: Completed Specimen: Blood from Peripheral, Antecubital, Left Updated: 07/03/24 1903     Blood Culture, Routine No Growth to date      No Growth to date    Blood culture [[id number]] Collected: 07/02/24 1610    Order Status: Completed Specimen: Blood from Peripheral, Antecubital, Right Updated: 07/03/24 1903     Blood Culture, Routine No Growth to date      No Growth to date    Culture, Anaerobe [[id number]] Collected: 07/03/24 1320    Order Status: Sent Specimen: Bone from Foot, Right Updated: 07/03/24 1803    AFB Culture & Smear [[id number]] Collected: 07/03/24 1320    Order Status: Sent Specimen: Bone from Foot, Right Updated: 07/03/24 1802    Aerobic culture [[id number]] Collected: 07/03/24 1320    Order Status: Sent Specimen: Bone from Foot, Right Updated: 07/03/24 1802    Fungus culture [[id number]] Collected: 07/03/24 1320    Order Status: Sent Specimen: Bone from Foot, Right Updated: 07/03/24 1801    Blood culture #1 **CANNOT BE ORDERED STAT** [[id number]] Collected: 07/02/24 0855    Order Status: Completed Specimen: Blood from Peripheral, Antecubital, Right Updated: 07/03/24 1503     Blood Culture, Routine No Growth to date      No Growth to date    Blood culture #2 **CANNOT BE ORDERED STAT** [[id number]] Collected: 07/02/24 0855    Order Status:  Completed Specimen: Blood from Peripheral, Antecubital, Left Updated: 07/03/24 1503     Blood Culture, Routine No Growth to date      No Growth to date    Gram stain [[id number]] Collected: 07/03/24 0810    Order Status: Completed Specimen: Wound from Foot, Right Updated: 07/03/24 1006     Gram Stain Result Moderate WBC's      Moderate Gram positive cocci      Few Gram negative rods    Aerobic culture (Specify Source) **CANNOT BE ORDERED AS STAT** [[id number]]  (Abnormal) Collected: 07/02/24 0844    Order Status: Completed Specimen: Wound from Leg, Right Updated: 07/03/24 0948     Aerobic Bacterial Culture STREPTOCOCCUS GROUP G  Moderate  Beta-hemolytic streptococci are routinely susceptible to   penicillins,cephalosporins and carbapenems.      Culture, Anaerobe [[id number]] Collected: 07/03/24 0810    Order Status: Sent Specimen: Wound from Foot, Right Updated: 07/03/24 0821    Aerobic culture [[id number]] Collected: 07/03/24 0810    Order Status: Sent Specimen: Wound from Foot, Right Updated: 07/03/24 0820    Culture, Body Fluid (Aerobic) w/ GS [[id number]]     Order Status: Canceled Specimen: Body Fluid

## 2024-07-04 NOTE — NURSING
[facility] - [place], [place]  Nurses Note -- 4 Eyes      7/4/2024       Skin assessed on: Q Shift      [x] No Pressure Injuries Present    [x]Prevention Measures Documented    [] Yes LDA  for Pressure Injury Previously documented     [] Yes New Pressure Injury Discovered   [] LDA for New Pressure Injury Added      Attending RN:  Analy Hull LPN     Second RN:  Tatyana Ramirez LPN

## 2024-07-04 NOTE — PLAN OF CARE
Pt remained free of falls during current shift.  Pt appeared to be in no distress and did not receive any prn pain medications. IV antibiotic was given per MD order. Plan of care and fall precautions reviewed with pt and verbalized understanding. Bed locked, lowered, SR up x2 and call light placed within reach.          Problem: Adult Inpatient Plan of Care  Goal: Plan of Care Review  Outcome: Progressing     Problem: Diabetes Comorbidity  Goal: Blood Glucose Level Within Targeted Range  Outcome: Progressing     Problem: Wound  Goal: Optimal Coping  Outcome: Progressing     Problem: Skin Injury Risk Increased  Goal: Skin Health and Integrity  Outcome: Progressing     Problem: Fall Injury Risk  Goal: Absence of Fall and Fall-Related Injury  Outcome: Progressing

## 2024-07-04 NOTE — NURSING
Dr. Jovel was notified that patient c/o pain 5/10. No PRN for pain was ordered. New order placed for PRN pain medication and insulin order was adjusted.

## 2024-07-05 LAB
ACID FAST MOD KINY STN SPEC: NORMAL
ALBUMIN SERPL BCP-MCNC: 1.6 G/DL (ref 3.5–5.2)
ALP SERPL-CCNC: 144 U/L (ref 55–135)
ALT SERPL W/O P-5'-P-CCNC: 9 U/L (ref 10–44)
ANION GAP SERPL CALC-SCNC: 8 MMOL/L (ref 8–16)
AST SERPL-CCNC: 13 U/L (ref 10–40)
BACTERIA SPEC AEROBE CULT: ABNORMAL
BACTERIA SPEC AEROBE CULT: ABNORMAL
BASOPHILS # BLD AUTO: 0.05 K/UL (ref 0–0.2)
BASOPHILS NFR BLD: 0.7 % (ref 0–1.9)
BILIRUB SERPL-MCNC: 0.1 MG/DL (ref 0.1–1)
BUN SERPL-MCNC: 15 MG/DL (ref 6–20)
CALCIUM SERPL-MCNC: 9.1 MG/DL (ref 8.7–10.5)
CHLORIDE SERPL-SCNC: 100 MMOL/L (ref 95–110)
CO2 SERPL-SCNC: 26 MMOL/L (ref 23–29)
CREAT SERPL-MCNC: 0.9 MG/DL (ref 0.5–1.4)
DIFFERENTIAL METHOD BLD: ABNORMAL
EOSINOPHIL # BLD AUTO: 0.2 K/UL (ref 0–0.5)
EOSINOPHIL NFR BLD: 2.6 % (ref 0–8)
ERYTHROCYTE [DISTWIDTH] IN BLOOD BY AUTOMATED COUNT: 12.8 % (ref 11.5–14.5)
EST. GFR  (NO RACE VARIABLE): >60 ML/MIN/1.73 M^2
GLUCOSE SERPL-MCNC: 247 MG/DL (ref 70–110)
HCT VFR BLD AUTO: 31.1 % (ref 40–54)
HGB BLD-MCNC: 9.4 G/DL (ref 14–18)
IMM GRANULOCYTES # BLD AUTO: 0.1 K/UL (ref 0–0.04)
IMM GRANULOCYTES NFR BLD AUTO: 1.4 % (ref 0–0.5)
LYMPHOCYTES # BLD AUTO: 1.6 K/UL (ref 1–4.8)
LYMPHOCYTES NFR BLD: 21.8 % (ref 18–48)
MAGNESIUM SERPL-MCNC: 1.8 MG/DL (ref 1.6–2.6)
MCH RBC QN AUTO: 26.3 PG (ref 27–31)
MCHC RBC AUTO-ENTMCNC: 30.2 G/DL (ref 32–36)
MCV RBC AUTO: 87 FL (ref 82–98)
MONOCYTES # BLD AUTO: 0.8 K/UL (ref 0.3–1)
MONOCYTES NFR BLD: 10.4 % (ref 4–15)
MYCOBACTERIUM SPEC QL CULT: NORMAL
NEUTROPHILS # BLD AUTO: 4.6 K/UL (ref 1.8–7.7)
NEUTROPHILS NFR BLD: 63.1 % (ref 38–73)
NRBC BLD-RTO: 0 /100 WBC
PHOSPHATE SERPL-MCNC: 2.9 MG/DL (ref 2.7–4.5)
PLATELET # BLD AUTO: 281 K/UL (ref 150–450)
PMV BLD AUTO: 10.6 FL (ref 9.2–12.9)
POCT GLUCOSE: 204 MG/DL (ref 70–110)
POCT GLUCOSE: 246 MG/DL (ref 70–110)
POCT GLUCOSE: 249 MG/DL (ref 70–110)
POCT GLUCOSE: 277 MG/DL (ref 70–110)
POTASSIUM SERPL-SCNC: 4.3 MMOL/L (ref 3.5–5.1)
PROT SERPL-MCNC: 7.4 G/DL (ref 6–8.4)
RBC # BLD AUTO: 3.58 M/UL (ref 4.6–6.2)
SODIUM SERPL-SCNC: 134 MMOL/L (ref 136–145)
VANCOMYCIN TROUGH SERPL-MCNC: 17.5 UG/ML (ref 10–22)
WBC # BLD AUTO: 7.21 K/UL (ref 3.9–12.7)

## 2024-07-05 PROCEDURE — 25000003 PHARM REV CODE 250: Performed by: STUDENT IN AN ORGANIZED HEALTH CARE EDUCATION/TRAINING PROGRAM

## 2024-07-05 PROCEDURE — 97161 PT EVAL LOW COMPLEX 20 MIN: CPT

## 2024-07-05 PROCEDURE — 94761 N-INVAS EAR/PLS OXIMETRY MLT: CPT

## 2024-07-05 PROCEDURE — 63600175 PHARM REV CODE 636 W HCPCS: Performed by: STUDENT IN AN ORGANIZED HEALTH CARE EDUCATION/TRAINING PROGRAM

## 2024-07-05 PROCEDURE — 85025 COMPLETE CBC W/AUTO DIFF WBC: CPT | Performed by: STUDENT IN AN ORGANIZED HEALTH CARE EDUCATION/TRAINING PROGRAM

## 2024-07-05 PROCEDURE — 97530 THERAPEUTIC ACTIVITIES: CPT

## 2024-07-05 PROCEDURE — 36415 COLL VENOUS BLD VENIPUNCTURE: CPT | Performed by: STUDENT IN AN ORGANIZED HEALTH CARE EDUCATION/TRAINING PROGRAM

## 2024-07-05 PROCEDURE — 97597 DBRDMT OPN WND 1ST 20 CM/<: CPT

## 2024-07-05 PROCEDURE — 11000001 HC ACUTE MED/SURG PRIVATE ROOM

## 2024-07-05 PROCEDURE — 84100 ASSAY OF PHOSPHORUS: CPT | Performed by: STUDENT IN AN ORGANIZED HEALTH CARE EDUCATION/TRAINING PROGRAM

## 2024-07-05 PROCEDURE — S4991 NICOTINE PATCH NONLEGEND: HCPCS | Performed by: STUDENT IN AN ORGANIZED HEALTH CARE EDUCATION/TRAINING PROGRAM

## 2024-07-05 PROCEDURE — 80053 COMPREHEN METABOLIC PANEL: CPT | Performed by: STUDENT IN AN ORGANIZED HEALTH CARE EDUCATION/TRAINING PROGRAM

## 2024-07-05 PROCEDURE — 80202 ASSAY OF VANCOMYCIN: CPT | Performed by: STUDENT IN AN ORGANIZED HEALTH CARE EDUCATION/TRAINING PROGRAM

## 2024-07-05 PROCEDURE — 83735 ASSAY OF MAGNESIUM: CPT | Performed by: STUDENT IN AN ORGANIZED HEALTH CARE EDUCATION/TRAINING PROGRAM

## 2024-07-05 PROCEDURE — 99233 SBSQ HOSP IP/OBS HIGH 50: CPT | Mod: ,,, | Performed by: STUDENT IN AN ORGANIZED HEALTH CARE EDUCATION/TRAINING PROGRAM

## 2024-07-05 RX ORDER — INSULIN ASPART 100 [IU]/ML
0-5 INJECTION, SOLUTION INTRAVENOUS; SUBCUTANEOUS
Status: DISCONTINUED | OUTPATIENT
Start: 2024-07-05 | End: 2024-07-08 | Stop reason: HOSPADM

## 2024-07-05 RX ORDER — METRONIDAZOLE 500 MG/1
500 TABLET ORAL EVERY 12 HOURS
Status: DISCONTINUED | OUTPATIENT
Start: 2024-07-05 | End: 2024-07-08 | Stop reason: HOSPADM

## 2024-07-05 RX ADMIN — GABAPENTIN 100 MG: 100 CAPSULE ORAL at 09:07

## 2024-07-05 RX ADMIN — INSULIN ASPART 2 UNITS: 100 INJECTION, SOLUTION INTRAVENOUS; SUBCUTANEOUS at 12:07

## 2024-07-05 RX ADMIN — INSULIN GLARGINE 18 UNITS: 100 INJECTION, SOLUTION SUBCUTANEOUS at 08:07

## 2024-07-05 RX ADMIN — INSULIN ASPART 2 UNITS: 100 INJECTION, SOLUTION INTRAVENOUS; SUBCUTANEOUS at 08:07

## 2024-07-05 RX ADMIN — GABAPENTIN 100 MG: 100 CAPSULE ORAL at 08:07

## 2024-07-05 RX ADMIN — Medication 1 PATCH: at 08:07

## 2024-07-05 RX ADMIN — INSULIN ASPART 1 UNITS: 100 INJECTION, SOLUTION INTRAVENOUS; SUBCUTANEOUS at 09:07

## 2024-07-05 RX ADMIN — HYDROMORPHONE HYDROCHLORIDE 0.5 MG: 1 INJECTION, SOLUTION INTRAMUSCULAR; INTRAVENOUS; SUBCUTANEOUS at 09:07

## 2024-07-05 RX ADMIN — GABAPENTIN 100 MG: 100 CAPSULE ORAL at 02:07

## 2024-07-05 RX ADMIN — INSULIN ASPART 6 UNITS: 100 INJECTION, SOLUTION INTRAVENOUS; SUBCUTANEOUS at 08:07

## 2024-07-05 RX ADMIN — ACETAMINOPHEN 1000 MG: 500 TABLET, FILM COATED ORAL at 02:07

## 2024-07-05 RX ADMIN — CEFTRIAXONE 2 G: 2 INJECTION, POWDER, FOR SOLUTION INTRAMUSCULAR; INTRAVENOUS at 03:07

## 2024-07-05 RX ADMIN — METRONIDAZOLE 500 MG: 500 TABLET ORAL at 09:07

## 2024-07-05 RX ADMIN — INSULIN GLARGINE 18 UNITS: 100 INJECTION, SOLUTION SUBCUTANEOUS at 09:07

## 2024-07-05 RX ADMIN — Medication 6 MG: at 09:07

## 2024-07-05 RX ADMIN — ACETAMINOPHEN 1000 MG: 500 TABLET, FILM COATED ORAL at 09:07

## 2024-07-05 RX ADMIN — METRONIDAZOLE 500 MG: 500 TABLET ORAL at 08:07

## 2024-07-05 RX ADMIN — INSULIN ASPART 6 UNITS: 100 INJECTION, SOLUTION INTRAVENOUS; SUBCUTANEOUS at 05:07

## 2024-07-05 RX ADMIN — INSULIN ASPART 3 UNITS: 100 INJECTION, SOLUTION INTRAVENOUS; SUBCUTANEOUS at 05:07

## 2024-07-05 RX ADMIN — INSULIN ASPART 6 UNITS: 100 INJECTION, SOLUTION INTRAVENOUS; SUBCUTANEOUS at 12:07

## 2024-07-05 NOTE — PROGRESS NOTES
[facility] Med Surg  Infectious Disease  Progress Note    Patient Name: Hany Guerin  MRN: [id number]  Admission Date: 7/2/2024  Length of Stay: 3 days  Attending Physician: Nabeel Jovel MD  Primary Care Provider: Lyle Thorne MD    Isolation Status: No active isolations  Assessment/Plan:      ID  * Acute osteomyelitis of 1st metatarsal bone of right foot  Hany Guerin is a 48 year old man with diabetes and right hallux amputation (2022) who was admitted for right foot infection. He states he has had a wound present since his amputation. He does not follow with wound care or podiatry. MRI foot with midfoot and forefoot cellulitis and osteomyelitis of the 1st metatarsal stump. He has not been on antibiotics recently, not admitted recently, has no water exposures. No risk for MDR or pseudomonal infections. He is clinically stable. Had bedside debridement on 7/3 with culture growing group G strep, surgical bone cultures also growing group G strep. Per operative note, remaining bone was soft and infected appearing. Pathology is pending.     Recommendations  - stop vancomycin  - continue ceftriaxone 2 grams q24 hours x 6 weeks  - start PO metronidazole 500 q12 hours for empiric anaerobic coverage   - okay for PICC line  - see below for OPAT note    Outpatient Antibiotic Therapy Plan:    Please send referral to [facility].    1) Infection: Group B strep diabetic foot infection with osteomyelitis     2) Discharge Antibiotics: IV ceftriaxone and PO metronidazole     Intravenous antibiotics:  Ceftriaxone 2 grams IV q 24 hours     Oral antibiotics:  Metronidazole 500 PO q 12 hours     3) Therapy Duration:  6 weeks    Estimated end date of IV antibiotics: 8/13/2024    4) Outpatient Weekly Labs:    Order the following labs to be drawn on Mondays:   CBC  CMP   CRP    5) Fax Lab Results to Infectious Diseases Provider: [facility] Fax Number:  [phone]    6) Outpatient Infectious Diseases Follow-up    Follow-up appointment will be arranged by the ID clinic and will be found in the patient's appointments tab.    Prior to discharge, please ensure the patient's follow-up has been scheduled.    If there is still no follow-up scheduled prior to discharge, please send an [employer] message to [facility] Clinical Pool or Call Infectious Diseases Dept.                Above discussed with primary team.     Time: 50 minutes   50% of time spent on face-to-face counseling and coordination of care. Counseling included review of test results, diagnosis, and treatment plan with patient and/or family.  I have reviewed hospital notes from  service and other specialty providers as well as outside medical records. I have also reviewed CBC, CMP/BMP,  cultures and imaging with my interpretation as documented. Patient is high risk of morbidity, on antibiotics requiring intensive monitoring for toxicity.     Anticipated Disposition: TBD    Thank you for your consult. I will sign off. Please contact us if you have any additional questions.    Hermelinda Man MD  Infectious Disease  [facility] - [place] - Med Surg    Subjective:     Principal Problem:Acute osteomyelitis of metatarsal bone of right foot    HPI: Hany Guerin is a 48 year old man with diabetes and right hallux amputation (2022) who was admitted for right foot infection. He states he has had a wound present since his amputation. He does not follow with wound care or podiatry. He presented because he fell while mowing the grass. He always wears shoes when outside (frequently crocs). He does not walk around barefoot. He keeps his foot wrapped. He has not been on antibiotics for multiple years. He does not soak is foot in water or go swimming. He has cats, they have not scratched/bitten him recently. He is on city water. He denies fevers or chills. Penicillin allergy is from childhood, he states he was told he had  anaphylaxis but has no memory of it.   Interval History: feeling well. Denies nausea, vomiting, diarrhea, rash, itching. States his family can help with IV antibiotics at home and he is willing to work light duty while he is healing and on antibiotics.     Review of Systems   Constitutional:  Negative for fatigue and fever.   Gastrointestinal:  Negative for diarrhea, nausea and vomiting.   Skin:  Positive for wound.     Objective:     Vital Signs (Most Recent):  Temp: 98.7 °F (37.1 °C) (07/05/24 1143)  Pulse: 82 (07/05/24 1143)  Resp: 18 (07/05/24 1143)  BP: 121/72 (07/05/24 1143)  SpO2: 99 % (07/05/24 1143) Vital Signs (24h Range):  Temp:  [98.4 °F (36.9 °C)-99.6 °F (37.6 °C)] 98.7 °F (37.1 °C)  Pulse:  [81-89] 82  Resp:  [18-19] 18  SpO2:  [95 %-99 %] 99 %  BP: (121-140)/(62-85) 121/72     Weight: 89.2 kg (196 lb 10.4 oz)  Body mass index is 31.74 kg/m².    Estimated Creatinine Clearance: 106.2 mL/min (based on SCr of 0.9 mg/dL).     Physical Exam  Vitals and nursing note reviewed.   Constitutional:       General: He is not in acute distress.     Appearance: Normal appearance.   HENT:      Head: Normocephalic.   Pulmonary:      Effort: Pulmonary effort is normal. No respiratory distress.   Musculoskeletal:      Comments: R foot wrapped   Skin:     Findings: No rash.   Neurological:      General: No focal deficit present.      Mental Status: He is alert and oriented to person, place, and time.          Significant Labs:   Microbiology Results (last 7 days)       Procedure Component Value Units Date/Time    Blood culture #2 **CANNOT BE ORDERED STAT** [[id number]] Collected: 07/02/24 0855    Order Status: Completed Specimen: Blood from Peripheral, Antecubital, Left Updated: 07/05/24 1503     Blood Culture, Routine No Growth to date      No Growth to date      No Growth to date      No Growth to date    Blood culture #1 **CANNOT BE ORDERED STAT** [[id number]] Collected: 07/02/24 0855    Order Status: Completed  Specimen: Blood from Peripheral, Antecubital, Right Updated: 07/05/24 1503     Blood Culture, Routine No Growth to date      No Growth to date      No Growth to date      No Growth to date    AFB Culture & Smear [[id number]] Collected: 07/03/24 1320    Order Status: Completed Specimen: Bone from Foot, Right Updated: 07/05/24 1445     AFB CULTURE STAIN No acid fast bacilli seen.    Culture, Anaerobe [[id number]] Collected: 07/03/24 1320    Order Status: Completed Specimen: Bone from Foot, Right Updated: 07/05/24 0722     Anaerobic Culture Culture in progress    Culture, Anaerobe [[id number]] Collected: 07/03/24 0810    Order Status: Completed Specimen: Wound from Foot, Right Updated: 07/05/24 0720     Anaerobic Culture Culture in progress    Aerobic culture [[id number]]  (Abnormal) Collected: 07/03/24 1320    Order Status: Completed Specimen: Bone from Foot, Right Updated: 07/05/24 0654     Aerobic Bacterial Culture STREPTOCOCCUS GROUP G  Rare  Beta-hemolytic streptococci are routinely susceptible to   penicillins,cephalosporins and carbapenems.      Aerobic culture [[id number]]  (Abnormal) Collected: 07/03/24 0810    Order Status: Completed Specimen: Wound from Foot, Right Updated: 07/05/24 0635     Aerobic Bacterial Culture STREPTOCOCCUS GROUP G  Moderate  Beta-hemolytic streptococci are routinely susceptible to   penicillins,cephalosporins and carbapenems.      Blood culture [[id number]] Collected: 07/02/24 1603    Order Status: Completed Specimen: Blood from Peripheral, Antecubital, Left Updated: 07/04/24 1903     Blood Culture, Routine No Growth to date      No Growth to date      No Growth to date    Blood culture [[id number]] Collected: 07/02/24 1610    Order Status: Completed Specimen: Blood from Peripheral, Antecubital, Right Updated: 07/04/24 1903     Blood Culture, Routine No Growth to date      No Growth to date      No Growth to date    Aerobic culture (Specify Source) **CANNOT BE ORDERED AS STAT**  [[id number]]  (Abnormal) Collected: 07/02/24 0844    Order Status: Completed Specimen: Wound from Leg, Right Updated: 07/04/24 0625     Aerobic Bacterial Culture STREPTOCOCCUS GROUP G  Moderate  Beta-hemolytic streptococci are routinely susceptible to   penicillins,cephalosporins and carbapenems.      Fungus culture [[id number]] Collected: 07/03/24 1320    Order Status: Sent Specimen: Bone from Foot, Right Updated: 07/03/24 1801    Gram stain [[id number]] Collected: 07/03/24 0810    Order Status: Completed Specimen: Wound from Foot, Right Updated: 07/03/24 1006     Gram Stain Result Moderate WBC's      Moderate Gram positive cocci      Few Gram negative rods    Culture, Body Fluid (Aerobic) w/ GS [[id number]]     Order Status: Canceled Specimen: Body Fluid             Significant Imaging: I have reviewed all pertinent imaging results/findings within the past 24 hours.

## 2024-07-05 NOTE — ASSESSMENT & PLAN NOTE
Hany Guerin is a 48 year old man with diabetes and right hallux amputation (2022) who was admitted for right foot infection. He states he has had a wound present since his amputation. He does not follow with wound care or podiatry. MRI foot with midfoot and forefoot cellulitis and osteomyelitis of the 1st metatarsal stump. Wound culture collected with GBS. He has not been on antibiotics recently, not admitted recently, has no water exposures. No risk for MDR or pseudomonal infections. He is clinically stable. Had bedside debridement on 7/3 with culture growing GBS, surgical bone cultures also growing GBS. Per operative note, remaining bone was soft and infected appearing. Pathology is pending.     Recommendations  - stop vancomycin  - continue ceftriaxone 2 grams q24 hours x 6 weeks  - start PO metronidazole 500 q12 hours for empiric anaerobic coverage   - okay for PICC line  - see below for OPAT note    Outpatient Antibiotic Therapy Plan:    Please send referral to [facility].    1) Infection: Group B strep diabetic foot infection with osteomyelitis     2) Discharge Antibiotics: IV ceftriaxone and PO metronidazole     Intravenous antibiotics:  Ceftriaxone 2 grams IV q 24 hours     Oral antibiotics:  Metronidazole 500 PO q 12 hours     3) Therapy Duration:  6 weeks    Estimated end date of IV antibiotics: 8/13/2024    4) Outpatient Weekly Labs:    Order the following labs to be drawn on Mondays:   CBC  CMP   CRP    5) Fax Lab Results to Infectious Diseases Provider: [facility] Fax Number: [phone]    6) Outpatient Infectious Diseases Follow-up    Follow-up appointment will be arranged by the ID clinic and will be found in the patient's appointments tab.    Prior to discharge, please ensure the patient's follow-up has been scheduled.    If there is still no follow-up scheduled prior to discharge, please send an EPIC message to [facility] Clinical Pool or  Call Infectious Diseases Dept.

## 2024-07-05 NOTE — PLAN OF CARE
Problem: Physical Therapy  Goal: Physical Therapy Goal  Outcome: Progressing     PT performed pulsavac wound care to R foot this am, will continue to see daily for wound care only.  Pt OOB>chair with mod I using no AD, able to maintain R heel WB with CAM boot.  Pt required no further acute skilled PT services and no DME's.  Pt already has a knee scooter at home and no therapy indicated once D/C'ed.

## 2024-07-05 NOTE — PROGRESS NOTES
[facility]  Podiatry  Progress Note      Subjective:     Interval History:  Patient 1 day s/p R foot I&D.  VSS, Afebrile, WBC WNL.  Patient doing well and is in good spirits he has remained NWB be since surgery.  Daughter also bedside.  He reports some this R foot that is tolerable with pain medications.  Denies fevers, chills nausea, vomiting.  Denies any new pedal complaints.    7/5/24: S/p 2 days right foot debridement Reports pain well controlled.     Scheduled Meds:   acetaminophen  1,000 mg Oral Q8H    cefTRIAXone (Rocephin) IV (PEDS and ADULTS)  2 g Intravenous Q24H    gabapentin  100 mg Oral TID    insulin aspart U-100  6 Units Subcutaneous TIDWM    insulin glargine U-100  18 Units Subcutaneous BID    melatonin  6 mg Oral Nightly    metroNIDAZOLE  500 mg Oral Q12H    nicotine  1 patch Transdermal Daily     Continuous Infusions:  PRN Meds:  Current Facility-Administered Medications:     glucagon (human recombinant), 1 mg, Intramuscular, PRN    glucose, 16 g, Oral, PRN    glucose, 24 g, Oral, PRN    HYDROmorphone, 0.5 mg, Intravenous, Q3H PRN    insulin aspart U-100, 0-5 Units, Subcutaneous, QID (AC + HS) PRN    naloxone, 0.02 mg, Intravenous, PRN    oxyCODONE, 5 mg, Oral, Q4H PRN    polyethylene glycol, 17 g, Oral, BID PRN    Review of patient's allergies indicates:   Allergen Reactions    Penicillins Anaphylaxis        Past Medical History:   Diagnosis Date    Diabetes mellitus     High cholesterol     Hypertension      Past Surgical History:   Procedure Laterality Date    CHOLECYSTECTOMY         Family History       Problem Relation (Age of Onset)    Diabetes Mother, Maternal Grandfather, Paternal Grandfather    Hypertension Mother, Father, Maternal Grandfather, Paternal Grandfather    Miscarriages / Stillbirths Paternal Grandmother          Tobacco Use    Smoking status: Every Day     Current packs/day: 1.00     Average packs/day: 1 pack/day for 30.0 years (30.0 ttl pk-yrs)     Types: Cigarettes      Start date: 7/12/1994    Smokeless tobacco: Never   Substance and Sexual Activity    Alcohol use: Yes     Alcohol/week: 3.0 - 4.0 standard drinks of alcohol     Types: 3 - 4 Cans of beer per week     Comment: Daily    Drug use: Yes     Frequency: 5.0 times per week     Types: Marijuana    Sexual activity: Yes     Partners: Female     Review of Systems   Constitutional:  Positive for activity change.   Respiratory:  Negative for shortness of breath.    Cardiovascular:  Negative for chest pain and leg swelling.   Gastrointestinal:  Negative for nausea and vomiting.   Musculoskeletal:  Positive for arthralgias.   Skin:  Positive for wound.   Neurological:  Positive for numbness. Negative for weakness.   Psychiatric/Behavioral: Negative.       Objective:     Vital Signs (Most Recent):  Temp: 98.7 °F (37.1 °C) (07/05/24 1143)  Pulse: 82 (07/05/24 1143)  Resp: 18 (07/05/24 1143)  BP: 121/72 (07/05/24 1143)  SpO2: 99 % (07/05/24 1143) Vital Signs (24h Range):  Temp:  [98 °F (36.7 °C)-99.6 °F (37.6 °C)] 98.7 °F (37.1 °C)  Pulse:  [81-89] 82  Resp:  [18-19] 18  SpO2:  [95 %-99 %] 99 %  BP: (121-140)/(62-85) 121/72     Weight: 89.2 kg (196 lb 10.4 oz)  Body mass index is 31.74 kg/m².    Foot Exam    General  Orientation: alert and oriented to person, place, and time       Right Foot/Ankle     Inspection and Palpation  Skin Exam: ulcer;     Neurovascular  Dorsalis pedis: 1+  Posterior tibial: 1+  Saphenous nerve sensation: diminished  Tibial nerve sensation: diminished  Superficial peroneal nerve sensation: diminished  Deep peroneal nerve sensation: diminished  Sural nerve sensation: diminished      Left Foot/Ankle      Inspection and Palpation  Skin Exam: no ulcer     Neurovascular  Dorsalis pedis: 1+  Posterior tibial: 1+  Saphenous nerve sensation: diminished  Tibial nerve sensation: diminished  Superficial peroneal nerve sensation: diminished  Deep peroneal nerve sensation: diminished  Sural nerve sensation:  diminished        7/3/24:  Pre debridement  wound with purulent drainage, probe to bone.     Post debridement       7/4:        S/p OR I&D.  Wound base with granular tissue.  Scant sanguinous drainage with manual expression.  No tenderness to palpation.  No malodor noted.  Appropriate post-op swelling and erythema noted.    7/5/24:                  Laboratory:  CBC:   Recent Labs   Lab 07/05/24  0453   WBC 7.21   RBC 3.58*   HGB 9.4*   HCT 31.1*      MCV 87   MCH 26.3*   MCHC 30.2*     CMP:   Recent Labs   Lab 07/05/24  0453   *   CALCIUM 9.1   ALBUMIN 1.6*   PROT 7.4   *   K 4.3   CO2 26      BUN 15   CREATININE 0.9   ALKPHOS 144*   ALT 9*   AST 13   BILITOT 0.1       Diagnostic Results:  Xray: X-Ray Foot Complete Right  Order: [id number]  Status: Final result       Visible to patient: Yes (not seen)       Next appt: None       Dx: Other acute osteomyelitis of right foot    0 Result Notes  Details    Reading Physician Reading Date Result Priority   Cristian Newberry MD  [phone]  [phone] 7/3/2024 Routine     Narrative & Impression  EXAMINATION:  XR FOOT COMPLETE 3 VIEW RIGHT     CLINICAL HISTORY:  wound right foot;. Other acute osteomyelitis, right ankle and foot     TECHNIQUE:  3 views of the right foot     COMPARISON:  Right foot radiographs 07/22/2019     FINDINGS:  Postoperative changes of transmetatarsal amputation of the 1st digit with acute osseous erosive changes suspected throughout the residual 1st metatarsal none surrounding subcutaneous emphysema.     Impression:     1. Postoperative changes of transmetatarsal amputation of the 1st digit with acute osseous erosive changes suspected throughout the residual 1st metatarsal none surrounding subcutaneous emphysema concerning for acute osteomyelitis.  This report was flagged in Epic as abnormal.     Clinical Findings:  Right foot ulceration with purulent drainage.     Assessment/Plan:     Active Diagnoses:    Diagnosis Date  Noted POA    PRINCIPAL PROBLEM:  Acute osteomyelitis of 1st metatarsal bone of right foot [M86.171] 07/02/2024 Yes    Type 2 diabetes mellitus with hyperglycemia, without long-term current use of insulin [E11.65] 07/20/2022 Yes    Tobacco dependence due to cigarettes [F17.210] 07/20/2022 Yes    Benign essential HTN [I10] 07/20/2022 Yes      Problems Resolved During this Admission:     Patient  s/p R foot wound incision, drainage, and debridement DOS: 7/3/24 Wound with granular tissue no purulent drainage or malodor noted.    Patient to remain NWB in long cam boot.    I  Antibiotics per primary team.    R foot dressed with Betadine-soaked gauze, dry gauze, Kerlix, and ACE wrap.  Along cam boot placed back on patient's R foot.    PT pulse lavage ordered    All other care per primary.    Thank you for your consult. I will follow-up with patient. Please contact us if you have any additional questions.    Ayla Cordova DPM  Podiatry  [facility]

## 2024-07-05 NOTE — PT/OT/SLP EVAL
Rehab Services Wound Care Evaluation    Hany Guerin  [id number]  7/5/[id number] (41 min-1 Eval, 1 wound care, and 1 TA)    Diagnosis:    History of current illness and wound.  Patient is a 48 y.o. male s/p R foot I+D by Podiatry on 7/3/24.    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[employer]   Tobacco Use    Smoking status: Every Day     Current packs/day: 1.00     Average packs/day: 1 pack/day for 30.0 years (30.0 ttl pk-yrs)     Types: Cigarettes     Start date: 7/12/1994    Smokeless tobacco: Never   Substance and Sexual Activity    Alcohol use: Yes     Alcohol/week: 3.0 - 4.0 standard drinks of alcohol     Types: 3 - 4 Cans of beer per week     Comment: Daily    Drug use: Yes     Frequency: 5.0 times per week     Types: Marijuana    Sexual activity: Yes     Partners: Female   Social History Narrative    Lives with mother in law and girlfriend       Precautions: Standard, Diabetes, and Weightbearing R heel WB with cam boot for transfers only    Allergies as of 07/02/2024 - Reviewed 07/02/2024   Allergen Reaction Noted    Penicillins Anaphylaxis 05/13/2015        Pre-medication: pain medication taken by patient prior to treatment    Subjective:     Pt reported no pain before, during, or after wound care treatment.     Objective:     Pt received pulsavac to R foot with 500 mL of NS.  R foot wound bed and periwound cleaned with moistened Vashe 4x4 gauze.  Cavilon no sting barrier film applied to periwound.  R foot completely dried.  R foot wound bed applied with betadine soaked 4x4 gauze and covered with multiple dry 4x4 gauze.  Dry 4x4 gauze also placed between toes.  R foot wrapped with 2 cast padding and ACE wrap, then secured with tape.  Clean technique maintained  throughout treatment.      Assessment:    Old dressings removed with minimal serous drainage.  R foot medial wound measured at 3 cm (L) x 3 cm (W).  R foot wound bed presented with minimal biofilm and pink tissue.  Periwound presented with min-moderate maceration, min-moderate edema, and maximum callus.  No odor present.  Patient tolerated today's treatment without any adverse effects.  Patient with diagnosis of R foot wound will benefit from skilled Rehab Services Wound Care to maximize wound healing potential and to assist wound to heal through appropriate healing phases.  Problems include multiple co-morbidities, diabetes, edema, excessive wound moisture and macerated tissue, decreased granulation tissue, and decreased protective sensation.    Photodocumentation:    R foot medial plantar view        R foot medial view        R foot             Plan:    The following goals were discussed with the patient and he agrees.  Frequency of pulsavac treatment at this time will be daily.      Functional PT eval:  Pt lives with spouse in a [facility] with 1 RANJIT at entry.  Pt was independent, driving, and working.  Pt has a knee scooter.  Pt educated on R heel WB with CAM boot for transfers only and NWB during ambulation.  Pt encouraged to use knee scooter for ambulation.  Pt educated on daily skin inspection to R foot.  Pt educated on LE therex and elevation of RLE.  Pt was mod I with bed>chair transfer, able to maintain R heel WB with CAM boot.  Pt able to follow multiple commands.  BLE ROM and strength WNL.  Pt with decreased light touch sensation to B foot.  Pt up in chair reclined with RLE elevated on pillow, call light within reach, tray table close by, and nurse Analy notified.  Pt required no DME's and no further acute skilled PT services.  Pt can be D/C'ed home with caregiver support.

## 2024-07-05 NOTE — NURSING
[facility] [place]  Nurses Note -- 4 Eyes      7/5/2024       Skin assessed on: Q Shift      [x] No Pressure Injuries Present    [x]Prevention Measures Documented    [] Yes LDA  for Pressure Injury Previously documented     [] Yes New Pressure Injury Discovered   [] LDA for New Pressure Injury Added      Attending RN:  Analy Hull LPN     Second RN:  Joy LintonRN

## 2024-07-05 NOTE — NURSING
Bedside Report given to night nurse JIM Hamilton. Walking rounds completed. Visualized and assessed patient NAD noted. Safety precautions maintained and call light within reach.     Chart check completed.

## 2024-07-05 NOTE — SUBJECTIVE & OBJECTIVE
Interval History: feeling well. Denies nausea, vomiting, diarrhea, rash, itching. States his family can help with IV antibiotics at home and he is willing to work light duty while he is healing and on antibiotics.     Review of Systems   Constitutional:  Negative for fatigue and fever.   Gastrointestinal:  Negative for diarrhea, nausea and vomiting.   Skin:  Positive for wound.     Objective:     Vital Signs (Most Recent):  Temp: 98.7 °F (37.1 °C) (07/05/24 1143)  Pulse: 82 (07/05/24 1143)  Resp: 18 (07/05/24 1143)  BP: 121/72 (07/05/24 1143)  SpO2: 99 % (07/05/24 1143) Vital Signs (24h Range):  Temp:  [98.4 °F (36.9 °C)-99.6 °F (37.6 °C)] 98.7 °F (37.1 °C)  Pulse:  [81-89] 82  Resp:  [18-19] 18  SpO2:  [95 %-99 %] 99 %  BP: (121-140)/(62-85) 121/72     Weight: 89.2 kg (196 lb 10.4 oz)  Body mass index is 31.74 kg/m².    Estimated Creatinine Clearance: 106.2 mL/min (based on SCr of 0.9 mg/dL).     Physical Exam  Vitals and nursing note reviewed.   Constitutional:       General: He is not in acute distress.     Appearance: Normal appearance.   HENT:      Head: Normocephalic.   Pulmonary:      Effort: Pulmonary effort is normal. No respiratory distress.   Musculoskeletal:      Comments: R foot wrapped   Skin:     Findings: No rash.   Neurological:      General: No focal deficit present.      Mental Status: He is alert and oriented to person, place, and time.          Significant Labs:   Microbiology Results (last 7 days)       Procedure Component Value Units Date/Time    Blood culture #2 **CANNOT BE ORDERED STAT** [[id number]] Collected: 07/02/24 0855    Order Status: Completed Specimen: Blood from Peripheral, Antecubital, Left Updated: 07/05/24 1503     Blood Culture, Routine No Growth to date      No Growth to date      No Growth to date      No Growth to date    Blood culture #1 **CANNOT BE ORDERED STAT** [[id number]] Collected: 07/02/24 0855    Order Status: Completed Specimen: Blood from Peripheral, Antecubital,  Right Updated: 07/05/24 1503     Blood Culture, Routine No Growth to date      No Growth to date      No Growth to date      No Growth to date    AFB Culture & Smear [[id number]] Collected: 07/03/24 1320    Order Status: Completed Specimen: Bone from Foot, Right Updated: 07/05/24 1445     AFB CULTURE STAIN No acid fast bacilli seen.    Culture, Anaerobe [[id number]] Collected: 07/03/24 1320    Order Status: Completed Specimen: Bone from Foot, Right Updated: 07/05/24 0722     Anaerobic Culture Culture in progress    Culture, Anaerobe [[id number]] Collected: 07/03/24 0810    Order Status: Completed Specimen: Wound from Foot, Right Updated: 07/05/24 0720     Anaerobic Culture Culture in progress    Aerobic culture [[id number]]  (Abnormal) Collected: 07/03/24 1320    Order Status: Completed Specimen: Bone from Foot, Right Updated: 07/05/24 0654     Aerobic Bacterial Culture STREPTOCOCCUS GROUP G  Rare  Beta-hemolytic streptococci are routinely susceptible to   penicillins,cephalosporins and carbapenems.      Aerobic culture [[id number]]  (Abnormal) Collected: 07/03/24 0810    Order Status: Completed Specimen: Wound from Foot, Right Updated: 07/05/24 0635     Aerobic Bacterial Culture STREPTOCOCCUS GROUP G  Moderate  Beta-hemolytic streptococci are routinely susceptible to   penicillins,cephalosporins and carbapenems.      Blood culture [[id number]] Collected: 07/02/24 1603    Order Status: Completed Specimen: Blood from Peripheral, Antecubital, Left Updated: 07/04/24 1903     Blood Culture, Routine No Growth to date      No Growth to date      No Growth to date    Blood culture [[id number]] Collected: 07/02/24 1610    Order Status: Completed Specimen: Blood from Peripheral, Antecubital, Right Updated: 07/04/24 1903     Blood Culture, Routine No Growth to date      No Growth to date      No Growth to date    Aerobic culture (Specify Source) **CANNOT BE ORDERED AS STAT** [[id number]]  (Abnormal) Collected: 07/02/24 0844     Order Status: Completed Specimen: Wound from Leg, Right Updated: 07/04/24 0625     Aerobic Bacterial Culture STREPTOCOCCUS GROUP G  Moderate  Beta-hemolytic streptococci are routinely susceptible to   penicillins,cephalosporins and carbapenems.      Fungus culture [[id number]] Collected: 07/03/24 1320    Order Status: Sent Specimen: Bone from Foot, Right Updated: 07/03/24 1801    Gram stain [[id number]] Collected: 07/03/24 0810    Order Status: Completed Specimen: Wound from Foot, Right Updated: 07/03/24 1006     Gram Stain Result Moderate WBC's      Moderate Gram positive cocci      Few Gram negative rods    Culture, Body Fluid (Aerobic) w/ GS [[id number]]     Order Status: Canceled Specimen: Body Fluid             Significant Imaging: I have reviewed all pertinent imaging results/findings within the past 24 hours.

## 2024-07-05 NOTE — ANESTHESIA POSTPROCEDURE EVALUATION
Anesthesia Post Evaluation    Patient: Hany Guerin    Procedure(s) Performed: Procedure(s) (LRB):  IRRIGATION AND DEBRIDEMENT (Right)    Final Anesthesia Type: general      Patient location during evaluation: PACU  Patient participation: Yes- Able to Participate  Level of consciousness: awake and alert  Post-procedure vital signs: reviewed and stable  Pain management: adequate  Airway patency: patent    PONV status at discharge: No PONV  Anesthetic complications: no      Respiratory status: spontaneous ventilation and room air  Hydration status: euvolemic  Follow-up not needed.              Vitals Value Taken Time   /72 07/05/24 1143   Temp 37.1 °C (98.7 °F) 07/05/24 1143   Pulse 82 07/05/24 1143   Resp 18 07/05/24 1143   SpO2 99 % 07/05/24 1143         Event Time   Out of Recovery 07/03/2024 14:50:48         Pain/Kimi Score: Pain Rating Prior to Med Admin: 3 (7/5/2024  2:59 PM)  Pain Rating Post Med Admin: 2 (7/5/2024  9:31 AM)  Kimi Score: 10 (7/5/2024  7:57 AM)

## 2024-07-05 NOTE — PROGRESS NOTES
[facility] Medicine  Progress Note    Patient Name: Hany Guerin  MRN: [id number]  Patient Class: IP- Inpatient   Admission Date: 7/2/2024  Length of Stay: 3 days  Attending Physician: Nabeel Jovel MD  Primary Care Provider: Lyle Thorne MD        Subjective:     Principal Problem:Acute osteomyelitis of metatarsal bone of right foot        HPI:    Hany Guerin is a 48 y.o. male who has a past medical history of Diabetes mellitus, High cholesterol, and Hypertension, presented to the ED with CC of Ankle Pain after fall.       No fracture or significant pathology was discovered of his ankle other than potential avascular necrosis of distal tibia; however, Xray did show soft tissue swelling bony destruction and gas seen in the portions of the foot along the 1st metatarsal. Patient has had long langford with osteomyelitis in the past of the right foot. He has had 1st R toe amp previously. Does still have drainage from foot, constantly. He has not been to wound care in over a year. Has not been on antibiotics for 2 years. And is not taking any insulin and his glucose is >500 on admission. He is not in DKA; no ketones in urine and pH wnl. Sodium 130. Hb dropped to 15 to 9.4, SED rate 119 & CRP elevated, WBC and Cr in normal limits however (LRINEC score 9). Podiatry and ID consulted. PEN allergy, anaphylaxis. Started on Vanc+Tomi.         Overview/Hospital Course:  Admitted after fall and sprained ankle, but found to have acute on chronic OM of 1st metatarsal base.   and . V+Tomi. Podiatry and ID consulted. Procedure 7/3/24. S/p debridement. Growing GGS in two different samples. Will stop flagyl, continue V+Ceft for now, ID back on tomorrow. May need to see if margins are clear, but may be able to get set up with antibiotics and discharge, and f/u outpt for margin results- will discuss. Reduced to CTX for GGS.       Review of Systems   Genitourinary:  Negative for  difficulty urinating, dysuria and hematuria.   Musculoskeletal:  Positive for arthralgias, gait problem and joint swelling.   Skin:  Positive for color change and wound. Negative for rash.     Objective:      Vitals:    07/05/24 0433 07/05/24 0745 07/05/24 0834 07/05/24 0901   BP: 139/85  (!) 140/83    BP Location: Left arm  Left arm    Patient Position: Lying  Lying    Pulse: 81  84    Resp: 18  18 18   Temp: 98.4 °F (36.9 °C)  98.6 °F (37 °C)    TempSrc: Oral  Oral    SpO2: 97% 97% 97%    Weight:       Height:           Body mass index is 31.74 kg/m².     Physical Exam  Vitals and nursing note reviewed.   Constitutional:       General: He is not in acute distress.     Appearance: He is well-developed. He is obese. He is not ill-appearing or diaphoretic.   HENT:      Head: Normocephalic and atraumatic.   Eyes:      General: No scleral icterus.     Pupils: Pupils are equal, round, and reactive to light.   Neck:      Thyroid: No thyromegaly.   Cardiovascular:      Rate and Rhythm: Normal rate and regular rhythm.      Heart sounds: No murmur heard.  Pulmonary:      Effort: Pulmonary effort is normal.      Breath sounds: Normal breath sounds. No stridor. No wheezing or rales.   Abdominal:      General: There is no distension.      Palpations: Abdomen is soft.      Tenderness: There is no guarding.   Musculoskeletal:         General: Swelling and deformity present. Normal range of motion.      Cervical back: Normal range of motion.      Right lower leg: Edema present.      Left lower leg: No edema.   Skin:     General: Skin is warm.      Capillary Refill: Capillary refill takes less than 2 seconds.      Findings: Lesion present.   Neurological:      Mental Status: He is alert and oriented to person, place, and time.      Cranial Nerves: No cranial nerve deficit.      Motor: No weakness.   Psychiatric:         Mood and Affect: Mood normal.         Behavior: Behavior normal.         Thought Content: Thought content  normal.         Judgment: Judgment normal.       Recent Results (from the past 24 hour(s))   POCT glucose    Collection Time: 07/04/24 11:51 AM   Result Value Ref Range    POCT Glucose 353 (H) 70 - 110 mg/dL   POCT glucose    Collection Time: 07/04/24  4:07 PM   Result Value Ref Range    POCT Glucose 369 (H) 70 - 110 mg/dL   POCT glucose    Collection Time: 07/04/24  8:00 PM   Result Value Ref Range    POCT Glucose 312 (H) 70 - 110 mg/dL   Magnesium    Collection Time: 07/05/24  4:53 AM   Result Value Ref Range    Magnesium 1.8 1.6 - 2.6 mg/dL   Phosphorus    Collection Time: 07/05/24  4:53 AM   Result Value Ref Range    Phosphorus 2.9 2.7 - 4.5 mg/dL   Comprehensive Metabolic Panel    Collection Time: 07/05/24  4:53 AM   Result Value Ref Range    Sodium 134 (L) 136 - 145 mmol/L    Potassium 4.3 3.5 - 5.1 mmol/L    Chloride 100 95 - 110 mmol/L    CO2 26 23 - 29 mmol/L    Glucose 247 (H) 70 - 110 mg/dL    BUN 15 6 - 20 mg/dL    Creatinine 0.9 0.5 - 1.4 mg/dL    Calcium 9.1 8.7 - 10.5 mg/dL    Total Protein 7.4 6.0 - 8.4 g/dL    Albumin 1.6 (L) 3.5 - 5.2 g/dL    Total Bilirubin 0.1 0.1 - 1.0 mg/dL    Alkaline Phosphatase 144 (H) 55 - 135 U/L    AST 13 10 - 40 U/L    ALT 9 (L) 10 - 44 U/L    eGFR >60 >60 mL/min/1.73 m^2    Anion Gap 8 8 - 16 mmol/L   CBC Auto Differential    Collection Time: 07/05/24  4:53 AM   Result Value Ref Range    WBC 7.21 3.90 - 12.70 K/uL    RBC 3.58 (L) 4.60 - 6.20 M/uL    Hemoglobin 9.4 (L) 14.0 - 18.0 g/dL    Hematocrit 31.1 (L) 40.0 - 54.0 %    MCV 87 82 - 98 fL    MCH 26.3 (L) 27.0 - 31.0 pg    MCHC 30.2 (L) 32.0 - 36.0 g/dL    RDW 12.8 11.5 - 14.5 %    Platelets 281 150 - 450 K/uL    MPV 10.6 9.2 - 12.9 fL    Immature Granulocytes 1.4 (H) 0.0 - 0.5 %    Gran # (ANC) 4.6 1.8 - 7.7 K/uL    Immature Grans (Abs) 0.10 (H) 0.00 - 0.04 K/uL    Lymph # 1.6 1.0 - 4.8 K/uL    Mono # 0.8 0.3 - 1.0 K/uL    Eos # 0.2 0.0 - 0.5 K/uL    Baso # 0.05 0.00 - 0.20 K/uL    nRBC 0 0 /100 WBC    Gran % 63.1  38.0 - 73.0 %    Lymph % 21.8 18.0 - 48.0 %    Mono % 10.4 4.0 - 15.0 %    Eosinophil % 2.6 0.0 - 8.0 %    Basophil % 0.7 0.0 - 1.9 %    Differential Method Automated    POCT glucose    Collection Time: 07/05/24  8:33 AM   Result Value Ref Range    POCT Glucose 246 (H) 70 - 110 mg/dL   VANCOMYCIN, TROUGH    Collection Time: 07/05/24  8:45 AM   Result Value Ref Range    Vancomycin-Trough 17.5 10.0 - 22.0 ug/mL       Microbiology Results (last 7 days)       Procedure Component Value Units Date/Time    Culture, Anaerobe [[id number]] Collected: 07/03/24 1320    Order Status: Completed Specimen: Bone from Foot, Right Updated: 07/05/24 0722     Anaerobic Culture Culture in progress    Culture, Anaerobe [[id number]] Collected: 07/03/24 0810    Order Status: Completed Specimen: Wound from Foot, Right Updated: 07/05/24 0720     Anaerobic Culture Culture in progress    Aerobic culture [[id number]]  (Abnormal) Collected: 07/03/24 1320    Order Status: Completed Specimen: Bone from Foot, Right Updated: 07/05/24 0654     Aerobic Bacterial Culture STREPTOCOCCUS GROUP G  Rare  Beta-hemolytic streptococci are routinely susceptible to   penicillins,cephalosporins and carbapenems.      Aerobic culture [[id number]]  (Abnormal) Collected: 07/03/24 0810    Order Status: Completed Specimen: Wound from Foot, Right Updated: 07/05/24 0635     Aerobic Bacterial Culture STREPTOCOCCUS GROUP G  Moderate  Beta-hemolytic streptococci are routinely susceptible to   penicillins,cephalosporins and carbapenems.      Blood culture [[id number]] Collected: 07/02/24 1603    Order Status: Completed Specimen: Blood from Peripheral, Antecubital, Left Updated: 07/04/24 1903     Blood Culture, Routine No Growth to date      No Growth to date      No Growth to date    Blood culture [[id number]] Collected: 07/02/24 1610    Order Status: Completed Specimen: Blood from Peripheral, Antecubital, Right Updated: 07/04/24 1903     Blood Culture, Routine No Growth to  date      No Growth to date      No Growth to date    Blood culture #2 **CANNOT BE ORDERED STAT** [[id number]] Collected: 07/02/24 0855    Order Status: Completed Specimen: Blood from Peripheral, Antecubital, Left Updated: 07/04/24 1503     Blood Culture, Routine No Growth to date      No Growth to date      No Growth to date    Blood culture #1 **CANNOT BE ORDERED STAT** [[id number]] Collected: 07/02/24 0855    Order Status: Completed Specimen: Blood from Peripheral, Antecubital, Right Updated: 07/04/24 1503     Blood Culture, Routine No Growth to date      No Growth to date      No Growth to date    AFB Culture & Smear [[id number]] Collected: 07/03/24 1320    Order Status: Sent Specimen: Bone from Foot, Right Updated: 07/04/24 1408    Aerobic culture (Specify Source) **CANNOT BE ORDERED AS STAT** [[id number]]  (Abnormal) Collected: 07/02/24 0844    Order Status: Completed Specimen: Wound from Leg, Right Updated: 07/04/24 0625     Aerobic Bacterial Culture STREPTOCOCCUS GROUP G  Moderate  Beta-hemolytic streptococci are routinely susceptible to   penicillins,cephalosporins and carbapenems.      Fungus culture [[id number]] Collected: 07/03/24 1320    Order Status: Sent Specimen: Bone from Foot, Right Updated: 07/03/24 1801    Gram stain [[id number]] Collected: 07/03/24 0810    Order Status: Completed Specimen: Wound from Foot, Right Updated: 07/03/24 1006     Gram Stain Result Moderate WBC's      Moderate Gram positive cocci      Few Gram negative rods    Culture, Body Fluid (Aerobic) w/ GS [[id number]]     Order Status: Canceled Specimen: Body Fluid              Imaging Results               X-Ray Ankle Complete Right (Final result)  Result time 07/02/24 08:13:44      Final result by Fritz Gudino MD (07/02/24 08:13:44)                   Impression:      osteomyelitis of the foot This report was flagged in Epic as abnormal.  The time is it    COMMUNICATION  This critical result was discovered/received at  810.  The critical information above was relayed directly by me by telephone to Dr.Erin Rubio on 07/02/2024 at 08:12      Electronically signed by: Fritz Gudino MD  Date:    07/02/2024  Time:    08:13               Narrative:    EXAMINATION:  XR ANKLE COMPLETE 3 VIEW RIGHT    CLINICAL HISTORY:  Pain in right ankle and joints of right foot    TECHNIQUE:  AP, lateral, and oblique images of the right ankle were performed.    COMPARISON:  None    FINDINGS:  Radiographs of the ankle show the ankle mortise to be intact.  There is some irregularity of the distal tibia in the region of the medial malleolus that may represent some type of avulsion fracture.    There is soft tissue swelling bony destruction and gas seen in the portions of the foot along the 1st metatarsal that are visualized.  Possible osteomyelitis with sepsis is suggested.                                          Assessment/Plan:      * Acute osteomyelitis of 1st metatarsal bone of right foot  Xray: soft tissue swelling bony destruction and gas seen in the portions of the foot along the 1st metatarsal.   He has had 1st R toe amp previously  Sodium 130. Hb dropped to 15 to 9.4, SED rate 119 & CRP elevated  WBC and Cr in normal limits however  LRINEC score 9, high risk for necrotizing soft tissue infx (>93%)  Vanc + Tomi  ID consult  Podiatry consult  MRI forefoot and midfoot  Control DM better    Benign essential HTN  Hold BP meds in setting of infection and possible anesthetics  SBP goal 140-180 inpt    Type 2 diabetes mellitus with hyperglycemia, without long-term current use of insulin  Patient with uncontrolled DM with glucoses >500 on arrival  Not in DKA, normal pH and no ketones in urine  Needs better control of glucoses, to reduce infections- counseled   He lost his job/insurance, now applying for medicaid   IV 25 U given of Reg insulin  Will start long acting and Mod SSI      Tobacco dependence due to cigarettes  Nicotine patch ordered  4m  counseling: The following was discussed concerning tobacco use:  Relevance of Quitting  Risk to Health  Long Term Risk  Risk for Others  Rewards of Quitting  Motivation Intervention to Quit      VTE Risk Mitigation (From admission, onward)           Ordered     IP VTE HIGH RISK PATIENT  Once         07/02/24 1525     Place sequential compression device  Until discontinued         07/02/24 1525                    Discharge Planning   MAURO:      Code Status: Full Code   Is the patient medically ready for discharge?:     Reason for patient still in hospital (select all that apply): Patient trending condition and Treatment                     Nabeel Jovel MD  Department of [facility] Medicine   [facility]

## 2024-07-05 NOTE — PLAN OF CARE
Pt remained free of falls during current shift. Pt appeared to be in no distress and did not receive any prn pain medications. IV fluids and IV antibiotic was given per MD order. Plan of care and fall precautions reviewed with pt and verbalized understanding. Bed locked, lowered, SR up x2 and call light placed within reach.          Problem: Adult Inpatient Plan of Care  Goal: Plan of Care Review  Outcome: Progressing     Problem: Diabetes Comorbidity  Goal: Blood Glucose Level Within Targeted Range  Outcome: Progressing     Problem: Wound  Goal: Optimal Coping  Outcome: Progressing     Problem: Fall Injury Risk  Goal: Absence of Fall and Fall-Related Injury  Outcome: Progressing

## 2024-07-06 LAB
ALBUMIN SERPL BCP-MCNC: 1.7 G/DL (ref 3.5–5.2)
ALP SERPL-CCNC: 151 U/L (ref 55–135)
ALT SERPL W/O P-5'-P-CCNC: 13 U/L (ref 10–44)
ANION GAP SERPL CALC-SCNC: 8 MMOL/L (ref 8–16)
AST SERPL-CCNC: 21 U/L (ref 10–40)
BACTERIA BLD CULT: NORMAL
BASOPHILS # BLD AUTO: 0.04 K/UL (ref 0–0.2)
BASOPHILS NFR BLD: 0.6 % (ref 0–1.9)
BILIRUB SERPL-MCNC: 0.2 MG/DL (ref 0.1–1)
BUN SERPL-MCNC: 13 MG/DL (ref 6–20)
CALCIUM SERPL-MCNC: 9.3 MG/DL (ref 8.7–10.5)
CHLORIDE SERPL-SCNC: 102 MMOL/L (ref 95–110)
CO2 SERPL-SCNC: 27 MMOL/L (ref 23–29)
CREAT SERPL-MCNC: 0.9 MG/DL (ref 0.5–1.4)
DIFFERENTIAL METHOD BLD: ABNORMAL
EOSINOPHIL # BLD AUTO: 0.3 K/UL (ref 0–0.5)
EOSINOPHIL NFR BLD: 3.5 % (ref 0–8)
ERYTHROCYTE [DISTWIDTH] IN BLOOD BY AUTOMATED COUNT: 12.8 % (ref 11.5–14.5)
EST. GFR  (NO RACE VARIABLE): >60 ML/MIN/1.73 M^2
GLUCOSE SERPL-MCNC: 194 MG/DL (ref 70–110)
HCT VFR BLD AUTO: 31.5 % (ref 40–54)
HGB BLD-MCNC: 9.7 G/DL (ref 14–18)
IMM GRANULOCYTES # BLD AUTO: 0.11 K/UL (ref 0–0.04)
IMM GRANULOCYTES NFR BLD AUTO: 1.5 % (ref 0–0.5)
LYMPHOCYTES # BLD AUTO: 1.7 K/UL (ref 1–4.8)
LYMPHOCYTES NFR BLD: 23.9 % (ref 18–48)
MAGNESIUM SERPL-MCNC: 1.8 MG/DL (ref 1.6–2.6)
MCH RBC QN AUTO: 26.1 PG (ref 27–31)
MCHC RBC AUTO-ENTMCNC: 30.8 G/DL (ref 32–36)
MCV RBC AUTO: 85 FL (ref 82–98)
MONOCYTES # BLD AUTO: 0.7 K/UL (ref 0.3–1)
MONOCYTES NFR BLD: 9.1 % (ref 4–15)
NEUTROPHILS # BLD AUTO: 4.4 K/UL (ref 1.8–7.7)
NEUTROPHILS NFR BLD: 61.4 % (ref 38–73)
NRBC BLD-RTO: 0 /100 WBC
PHOSPHATE SERPL-MCNC: 3.8 MG/DL (ref 2.7–4.5)
PLATELET # BLD AUTO: 306 K/UL (ref 150–450)
PMV BLD AUTO: 10.2 FL (ref 9.2–12.9)
POCT GLUCOSE: 187 MG/DL (ref 70–110)
POCT GLUCOSE: 210 MG/DL (ref 70–110)
POCT GLUCOSE: 215 MG/DL (ref 70–110)
POCT GLUCOSE: 227 MG/DL (ref 70–110)
POTASSIUM SERPL-SCNC: 4.3 MMOL/L (ref 3.5–5.1)
PROT SERPL-MCNC: 7.7 G/DL (ref 6–8.4)
RBC # BLD AUTO: 3.71 M/UL (ref 4.6–6.2)
SODIUM SERPL-SCNC: 137 MMOL/L (ref 136–145)
WBC # BLD AUTO: 7.14 K/UL (ref 3.9–12.7)

## 2024-07-06 PROCEDURE — 97597 DBRDMT OPN WND 1ST 20 CM/<: CPT

## 2024-07-06 PROCEDURE — 83735 ASSAY OF MAGNESIUM: CPT | Performed by: STUDENT IN AN ORGANIZED HEALTH CARE EDUCATION/TRAINING PROGRAM

## 2024-07-06 PROCEDURE — 85025 COMPLETE CBC W/AUTO DIFF WBC: CPT | Performed by: STUDENT IN AN ORGANIZED HEALTH CARE EDUCATION/TRAINING PROGRAM

## 2024-07-06 PROCEDURE — 63600175 PHARM REV CODE 636 W HCPCS: Performed by: STUDENT IN AN ORGANIZED HEALTH CARE EDUCATION/TRAINING PROGRAM

## 2024-07-06 PROCEDURE — 84100 ASSAY OF PHOSPHORUS: CPT | Performed by: STUDENT IN AN ORGANIZED HEALTH CARE EDUCATION/TRAINING PROGRAM

## 2024-07-06 PROCEDURE — 36415 COLL VENOUS BLD VENIPUNCTURE: CPT | Performed by: STUDENT IN AN ORGANIZED HEALTH CARE EDUCATION/TRAINING PROGRAM

## 2024-07-06 PROCEDURE — 11000001 HC ACUTE MED/SURG PRIVATE ROOM

## 2024-07-06 PROCEDURE — 80053 COMPREHEN METABOLIC PANEL: CPT | Performed by: STUDENT IN AN ORGANIZED HEALTH CARE EDUCATION/TRAINING PROGRAM

## 2024-07-06 PROCEDURE — 25000003 PHARM REV CODE 250: Performed by: STUDENT IN AN ORGANIZED HEALTH CARE EDUCATION/TRAINING PROGRAM

## 2024-07-06 RX ORDER — INSULIN ASPART 100 [IU]/ML
8 INJECTION, SOLUTION INTRAVENOUS; SUBCUTANEOUS
Status: DISCONTINUED | OUTPATIENT
Start: 2024-07-06 | End: 2024-07-08 | Stop reason: HOSPADM

## 2024-07-06 RX ORDER — AMLODIPINE BESYLATE 5 MG/1
5 TABLET ORAL DAILY
Status: DISCONTINUED | OUTPATIENT
Start: 2024-07-06 | End: 2024-07-07

## 2024-07-06 RX ORDER — IBUPROFEN 600 MG/1
600 TABLET ORAL EVERY 6 HOURS PRN
Status: DISCONTINUED | OUTPATIENT
Start: 2024-07-06 | End: 2024-07-08 | Stop reason: HOSPADM

## 2024-07-06 RX ORDER — INSULIN GLARGINE 100 [IU]/ML
24 INJECTION, SOLUTION SUBCUTANEOUS 2 TIMES DAILY
Status: DISCONTINUED | OUTPATIENT
Start: 2024-07-06 | End: 2024-07-08 | Stop reason: HOSPADM

## 2024-07-06 RX ADMIN — Medication 6 MG: at 09:07

## 2024-07-06 RX ADMIN — ACETAMINOPHEN 1000 MG: 500 TABLET, FILM COATED ORAL at 09:07

## 2024-07-06 RX ADMIN — INSULIN ASPART 8 UNITS: 100 INJECTION, SOLUTION INTRAVENOUS; SUBCUTANEOUS at 04:07

## 2024-07-06 RX ADMIN — ACETAMINOPHEN 1000 MG: 500 TABLET, FILM COATED ORAL at 05:07

## 2024-07-06 RX ADMIN — INSULIN ASPART 8 UNITS: 100 INJECTION, SOLUTION INTRAVENOUS; SUBCUTANEOUS at 08:07

## 2024-07-06 RX ADMIN — METRONIDAZOLE 500 MG: 500 TABLET ORAL at 08:07

## 2024-07-06 RX ADMIN — INSULIN ASPART 8 UNITS: 100 INJECTION, SOLUTION INTRAVENOUS; SUBCUTANEOUS at 12:07

## 2024-07-06 RX ADMIN — GABAPENTIN 100 MG: 100 CAPSULE ORAL at 08:07

## 2024-07-06 RX ADMIN — INSULIN GLARGINE 24 UNITS: 100 INJECTION, SOLUTION SUBCUTANEOUS at 08:07

## 2024-07-06 RX ADMIN — GABAPENTIN 100 MG: 100 CAPSULE ORAL at 09:07

## 2024-07-06 RX ADMIN — CEFTRIAXONE 2 G: 2 INJECTION, POWDER, FOR SOLUTION INTRAMUSCULAR; INTRAVENOUS at 02:07

## 2024-07-06 RX ADMIN — AMLODIPINE BESYLATE 5 MG: 5 TABLET ORAL at 08:07

## 2024-07-06 RX ADMIN — METRONIDAZOLE 500 MG: 500 TABLET ORAL at 09:07

## 2024-07-06 RX ADMIN — INSULIN ASPART 2 UNITS: 100 INJECTION, SOLUTION INTRAVENOUS; SUBCUTANEOUS at 04:07

## 2024-07-06 RX ADMIN — INSULIN ASPART 2 UNITS: 100 INJECTION, SOLUTION INTRAVENOUS; SUBCUTANEOUS at 09:07

## 2024-07-06 RX ADMIN — INSULIN ASPART 2 UNITS: 100 INJECTION, SOLUTION INTRAVENOUS; SUBCUTANEOUS at 12:07

## 2024-07-06 RX ADMIN — GABAPENTIN 100 MG: 100 CAPSULE ORAL at 02:07

## 2024-07-06 RX ADMIN — INSULIN GLARGINE 24 UNITS: 100 INJECTION, SOLUTION SUBCUTANEOUS at 09:07

## 2024-07-06 NOTE — PROGRESS NOTES
[facility] Medicine  Progress Note    Patient Name: Hany Guerin  MRN: [id number]  Patient Class: IP- Inpatient   Admission Date: 7/2/2024  Length of Stay: 4 days  Attending Physician: Nabeel Jovel MD  Primary Care Provider: Lyle Thorne MD        Subjective:     Principal Problem:Acute osteomyelitis of metatarsal bone of right foot        HPI:    Hany Guerin is a 48 y.o. male who has a past medical history of Diabetes mellitus, High cholesterol, and Hypertension, presented to the ED with CC of Ankle Pain after fall.       No fracture or significant pathology was discovered of his ankle other than potential avascular necrosis of distal tibia; however, Xray did show soft tissue swelling bony destruction and gas seen in the portions of the foot along the 1st metatarsal. Patient has had long langford with osteomyelitis in the past of the right foot. He has had 1st R toe amp previously. Does still have drainage from foot, constantly. He has not been to wound care in over a year. Has not been on antibiotics for 2 years. And is not taking any insulin and his glucose is >500 on admission. He is not in DKA; no ketones in urine and pH wnl. Sodium 130. Hb dropped to 15 to 9.4, SED rate 119 & CRP elevated, WBC and Cr in normal limits however (LRINEC score 9). Podiatry and ID consulted. PEN allergy, anaphylaxis. Started on Vanc+Tomi.         Overview/Hospital Course:  Admitted after fall and sprained ankle, but found to have acute on chronic OM of 1st metatarsal base.   and . V+Tomi. Podiatry and ID consulted. Procedure 7/3/24. S/p debridement. Growing GGS in two different samples. Will stop flagyl, continue V+Ceft for now, ID back on tomorrow. May need to see if margins are clear, but may be able to get set up with antibiotics and discharge, and f/u outpt for margin results- will discuss. Reduced to CTX for GGS.    Per ID: - continue ceftriaxone 2 grams q24 hours x 6  weeks  - start PO metronidazole 500 q12 hours for empiric anaerobic coverage   - okay for PICC line      Unfortunately, he does not have insurance, so will need to wait for medicaid acceptance before can discharge.. discuss options w ID. In addition, requiring much insulin that he will not be able to afford w/o medicaid as well. Lost job w insurance.        Review of Systems   Genitourinary:  Negative for difficulty urinating, dysuria and hematuria.   Musculoskeletal:  Positive for arthralgias, gait problem and joint swelling.   Skin:  Positive for color change and wound. Negative for rash.     Objective:      Vitals:    07/05/24 1705 07/05/24 2003 07/06/24 0016 07/06/24 0424   BP: (!) 159/81 (!) 153/82 (!) 152/74 136/81   BP Location: Left arm Left arm Left arm Left arm   Patient Position: Lying Lying Lying Lying   Pulse: 84 84 85 84   Resp: 18 19 16 20   Temp: 98.9 °F (37.2 °C) 96.9 °F (36.1 °C) 98.4 °F (36.9 °C) 98.4 °F (36.9 °C)   TempSrc: Oral Oral Oral Oral   SpO2: 96% 98% 97% 98%   Weight:       Height:           Body mass index is 31.74 kg/m².     Physical Exam  Vitals and nursing note reviewed.   Constitutional:       General: He is not in acute distress.     Appearance: He is well-developed. He is obese. He is not ill-appearing or diaphoretic.   HENT:      Head: Normocephalic and atraumatic.   Eyes:      General: No scleral icterus.     Pupils: Pupils are equal, round, and reactive to light.   Neck:      Thyroid: No thyromegaly.   Cardiovascular:      Rate and Rhythm: Normal rate and regular rhythm.      Heart sounds: No murmur heard.  Pulmonary:      Effort: Pulmonary effort is normal.      Breath sounds: Normal breath sounds. No stridor. No wheezing or rales.   Abdominal:      General: There is no distension.      Palpations: Abdomen is soft.      Tenderness: There is no guarding.   Musculoskeletal:         General: Swelling and deformity present. Normal range of motion.      Cervical back: Normal range  of motion.      Right lower leg: Edema present.      Left lower leg: No edema.   Skin:     General: Skin is warm.      Capillary Refill: Capillary refill takes less than 2 seconds.      Findings: Lesion present.   Neurological:      Mental Status: He is alert and oriented to person, place, and time.      Cranial Nerves: No cranial nerve deficit.      Motor: No weakness.   Psychiatric:         Mood and Affect: Mood normal.         Behavior: Behavior normal.         Thought Content: Thought content normal.         Judgment: Judgment normal.       Recent Results (from the past 24 hour(s))   POCT glucose    Collection Time: 07/05/24  8:33 AM   Result Value Ref Range    POCT Glucose 246 (H) 70 - 110 mg/dL   VANCOMYCIN, TROUGH    Collection Time: 07/05/24  8:45 AM   Result Value Ref Range    Vancomycin-Trough 17.5 10.0 - 22.0 ug/mL   POCT glucose    Collection Time: 07/05/24 11:44 AM   Result Value Ref Range    POCT Glucose 249 (H) 70 - 110 mg/dL   POCT glucose    Collection Time: 07/05/24  5:06 PM   Result Value Ref Range    POCT Glucose 277 (H) 70 - 110 mg/dL   POCT glucose    Collection Time: 07/05/24  9:06 PM   Result Value Ref Range    POCT Glucose 204 (H) 70 - 110 mg/dL   Magnesium    Collection Time: 07/06/24  5:27 AM   Result Value Ref Range    Magnesium 1.8 1.6 - 2.6 mg/dL   Phosphorus    Collection Time: 07/06/24  5:27 AM   Result Value Ref Range    Phosphorus 3.8 2.7 - 4.5 mg/dL   Comprehensive Metabolic Panel    Collection Time: 07/06/24  5:27 AM   Result Value Ref Range    Sodium 137 136 - 145 mmol/L    Potassium 4.3 3.5 - 5.1 mmol/L    Chloride 102 95 - 110 mmol/L    CO2 27 23 - 29 mmol/L    Glucose 194 (H) 70 - 110 mg/dL    BUN 13 6 - 20 mg/dL    Creatinine 0.9 0.5 - 1.4 mg/dL    Calcium 9.3 8.7 - 10.5 mg/dL    Total Protein 7.7 6.0 - 8.4 g/dL    Albumin 1.7 (L) 3.5 - 5.2 g/dL    Total Bilirubin 0.2 0.1 - 1.0 mg/dL    Alkaline Phosphatase 151 (H) 55 - 135 U/L    AST 21 10 - 40 U/L    ALT 13 10 - 44 U/L     eGFR >60 >60 mL/min/1.73 m^2    Anion Gap 8 8 - 16 mmol/L   CBC Auto Differential    Collection Time: 07/06/24  5:27 AM   Result Value Ref Range    WBC 7.14 3.90 - 12.70 K/uL    RBC 3.71 (L) 4.60 - 6.20 M/uL    Hemoglobin 9.7 (L) 14.0 - 18.0 g/dL    Hematocrit 31.5 (L) 40.0 - 54.0 %    MCV 85 82 - 98 fL    MCH 26.1 (L) 27.0 - 31.0 pg    MCHC 30.8 (L) 32.0 - 36.0 g/dL    RDW 12.8 11.5 - 14.5 %    Platelets 306 150 - 450 K/uL    MPV 10.2 9.2 - 12.9 fL    Immature Granulocytes 1.5 (H) 0.0 - 0.5 %    Gran # (ANC) 4.4 1.8 - 7.7 K/uL    Immature Grans (Abs) 0.11 (H) 0.00 - 0.04 K/uL    Lymph # 1.7 1.0 - 4.8 K/uL    Mono # 0.7 0.3 - 1.0 K/uL    Eos # 0.3 0.0 - 0.5 K/uL    Baso # 0.04 0.00 - 0.20 K/uL    nRBC 0 0 /100 WBC    Gran % 61.4 38.0 - 73.0 %    Lymph % 23.9 18.0 - 48.0 %    Mono % 9.1 4.0 - 15.0 %    Eosinophil % 3.5 0.0 - 8.0 %    Basophil % 0.6 0.0 - 1.9 %    Differential Method Automated        Microbiology Results (last 7 days)       Procedure Component Value Units Date/Time    AFB Culture & Smear [[id number]] Collected: 07/03/24 1320    Order Status: Completed Specimen: Bone from Foot, Right Updated: 07/05/24 2127     AFB Culture & Smear Culture in progress     AFB CULTURE STAIN No acid fast bacilli seen.    Blood culture [[id number]] Collected: 07/02/24 1603    Order Status: Completed Specimen: Blood from Peripheral, Antecubital, Left Updated: 07/05/24 1903     Blood Culture, Routine No Growth to date      No Growth to date      No Growth to date      No Growth to date    Blood culture [[id number]] Collected: 07/02/24 1610    Order Status: Completed Specimen: Blood from Peripheral, Antecubital, Right Updated: 07/05/24 1903     Blood Culture, Routine No Growth to date      No Growth to date      No Growth to date      No Growth to date    Blood culture #2 **CANNOT BE ORDERED STAT** [[id number]] Collected: 07/02/24 0855    Order Status: Completed Specimen: Blood from Peripheral, Antecubital, Left Updated:  07/05/24 1503     Blood Culture, Routine No Growth to date      No Growth to date      No Growth to date      No Growth to date    Blood culture #1 **CANNOT BE ORDERED STAT** [[id number]] Collected: 07/02/24 0855    Order Status: Completed Specimen: Blood from Peripheral, Antecubital, Right Updated: 07/05/24 1503     Blood Culture, Routine No Growth to date      No Growth to date      No Growth to date      No Growth to date    Culture, Anaerobe [[id number]] Collected: 07/03/24 1320    Order Status: Completed Specimen: Bone from Foot, Right Updated: 07/05/24 0722     Anaerobic Culture Culture in progress    Culture, Anaerobe [[id number]] Collected: 07/03/24 0810    Order Status: Completed Specimen: Wound from Foot, Right Updated: 07/05/24 0720     Anaerobic Culture Culture in progress    Aerobic culture [[id number]]  (Abnormal) Collected: 07/03/24 1320    Order Status: Completed Specimen: Bone from Foot, Right Updated: 07/05/24 0654     Aerobic Bacterial Culture STREPTOCOCCUS GROUP G  Rare  Beta-hemolytic streptococci are routinely susceptible to   penicillins,cephalosporins and carbapenems.      Aerobic culture [[id number]]  (Abnormal) Collected: 07/03/24 0810    Order Status: Completed Specimen: Wound from Foot, Right Updated: 07/05/24 0635     Aerobic Bacterial Culture STREPTOCOCCUS GROUP G  Moderate  Beta-hemolytic streptococci are routinely susceptible to   penicillins,cephalosporins and carbapenems.      Aerobic culture (Specify Source) **CANNOT BE ORDERED AS STAT** [[id number]]  (Abnormal) Collected: 07/02/24 0844    Order Status: Completed Specimen: Wound from Leg, Right Updated: 07/04/24 0625     Aerobic Bacterial Culture STREPTOCOCCUS GROUP G  Moderate  Beta-hemolytic streptococci are routinely susceptible to   penicillins,cephalosporins and carbapenems.      Fungus culture [[id number]] Collected: 07/03/24 1320    Order Status: Sent Specimen: Bone from Foot, Right Updated: 07/03/24 1801    Gram stain  [[id number]] Collected: 07/03/24 0810    Order Status: Completed Specimen: Wound from Foot, Right Updated: 07/03/24 1006     Gram Stain Result Moderate WBC's      Moderate Gram positive cocci      Few Gram negative rods    Culture, Body Fluid (Aerobic) w/ GS [[id number]]     Order Status: Canceled Specimen: Body Fluid              Imaging Results               X-Ray Ankle Complete Right (Final result)  Result time 07/02/24 08:13:44      Final result by Fritz Gudino MD (07/02/24 08:13:44)                   Impression:      osteomyelitis of the foot This report was flagged in Epic as abnormal.  The time is it    COMMUNICATION  This critical result was discovered/received at 810.  The critical information above was relayed directly by me by telephone to Dr.Erin Rubio on 07/02/2024 at 08:12      Electronically signed by: Fritz Gudino MD  Date:    07/02/2024  Time:    08:13               Narrative:    EXAMINATION:  XR ANKLE COMPLETE 3 VIEW RIGHT    CLINICAL HISTORY:  Pain in right ankle and joints of right foot    TECHNIQUE:  AP, lateral, and oblique images of the right ankle were performed.    COMPARISON:  None    FINDINGS:  Radiographs of the ankle show the ankle mortise to be intact.  There is some irregularity of the distal tibia in the region of the medial malleolus that may represent some type of avulsion fracture.    There is soft tissue swelling bony destruction and gas seen in the portions of the foot along the 1st metatarsal that are visualized.  Possible osteomyelitis with sepsis is suggested.                                          Assessment/Plan:      * Acute osteomyelitis of 1st metatarsal bone of right foot  Xray: soft tissue swelling bony destruction and gas seen in the portions of the foot along the 1st metatarsal.   He has had 1st R toe amp previously  Sodium 130. Hb dropped to 15 to 9.4, SED rate 119 & CRP elevated  WBC and Cr in normal limits however  LRINEC score 9, high risk for  necrotizing soft tissue infx (>93%)  Vanc + Tomi  ID consult  Podiatry consult  MRI forefoot and midfoot  Control DM better    Benign essential HTN  Hold BP meds in setting of infection and possible anesthetics  SBP goal 140-180 inpt    Type 2 diabetes mellitus with hyperglycemia, without long-term current use of insulin  Patient with uncontrolled DM with glucoses >500 on arrival  Not in DKA, normal pH and no ketones in urine  Needs better control of glucoses, to reduce infections- counseled   He lost his job/insurance, now applying for medicaid   IV 25 U given of Reg insulin  Will start long acting and Mod SSI      Tobacco dependence due to cigarettes  Nicotine patch ordered  4m counseling: The following was discussed concerning tobacco use:  Relevance of Quitting  Risk to Health  Long Term Risk  Risk for Others  Rewards of Quitting  Motivation Intervention to Quit      VTE Risk Mitigation (From admission, onward)           Ordered     IP VTE HIGH RISK PATIENT  Once         07/02/24 1525     Place sequential compression device  Until discontinued         07/02/24 1525                    Discharge Planning   MAURO:      Code Status: Full Code   Is the patient medically ready for discharge?:     Reason for patient still in hospital (select all that apply): Patient trending condition and Treatment                     Nabeel Jovel MD  Department of Hospital Medicine   [facility]

## 2024-07-06 NOTE — PLAN OF CARE
Problem: Adult Inpatient Plan of Care  Goal: Plan of Care Review  Flowsheets (Taken 7/6/2024 0039)  Plan of Care Reviewed With: patient  Goal: Absence of Hospital-Acquired Illness or Injury  Intervention: Identify and Manage Fall Risk  Flowsheets (Taken 7/6/2024 0039)  Safety Promotion/Fall Prevention:   Fall Risk reviewed with patient/family   lighting adjusted   medications reviewed   room near unit station  Intervention: Prevent Skin Injury  Flowsheets (Taken 7/6/2024 0039)  Body Position: position changed independently  Intervention: Prevent and Manage VTE (Venous Thromboembolism) Risk  Flowsheets (Taken 7/6/2024 0039)  VTE Prevention/Management: ROM (active) performed  Goal: Optimal Comfort and Wellbeing  Intervention: Provide Person-Centered Care  Flowsheets (Taken 7/6/2024 0039)  Trust Relationship/Rapport:   care explained   choices provided   questions answered   questions encouraged   thoughts/feelings acknowledged     Problem: Diabetes Comorbidity  Goal: Blood Glucose Level Within Targeted Range  Intervention: Monitor and Manage Glycemia  Flowsheets (Taken 7/6/2024 0039)  Glycemic Management:   blood glucose monitored   supplemental insulin given     Problem: Fall Injury Risk  Goal: Absence of Fall and Fall-Related Injury  Intervention: Identify and Manage Contributors  Flowsheets (Taken 7/6/2024 0039)  Self-Care Promotion:   independence encouraged   BADL personal objects within reach  Medication Review/Management: medications reviewed  Intervention: Promote Injury-Free Environment  Flowsheets (Taken 7/6/2024 0039)  Safety Promotion/Fall Prevention:   Fall Risk reviewed with patient/family   lighting adjusted   medications reviewed   room near unit station     Problem: Infection  Goal: Absence of Infection Signs and Symptoms  Intervention: Prevent or Manage Infection  Flowsheets (Taken 7/6/2024 0039)  Infection Management: (IV and oral antibiotics continued)   aseptic technique maintained   cultures  obtained and sent to lab   other (see comments)

## 2024-07-06 NOTE — NURSING
Bedside Report given to night nurse JIM Austin. Walking rounds completed. Visualized and assessed patient NAD noted. Safety precautions maintained and call light within reach.     Chart check completed.

## 2024-07-06 NOTE — NURSING
[facility]  Nurses Note -- 4 Eyes      7/6/2024       Skin assessed on: Q Shift      [x] No Pressure Injuries Present    [x]Prevention Measures Documented    [] Yes LDA  for Pressure Injury Previously documented     [] Yes New Pressure Injury Discovered   [] LDA for New Pressure Injury Added      Attending RN:  Geovanna Parekh, RN     Second RN:  Aanly MARTINEZ LPN

## 2024-07-06 NOTE — PT/OT/SLP PROGRESS
Rehab Services Wound Care Progress Note    Hany Guerin  [id number]  7/6/2024 [id number] (29 min - 1 wound care)    Diagnosis:    History of current illness and wound.  Patient is a 48 y.o. male s/p R foot I+D by Podiatry on 7/3/24.        Precautions: Standard, Diabetes, and Weightbearing R heel WB with cam boot for transfers only     Allergies as of 07/02/2024 - Reviewed 07/02/2024   Allergen Reaction Noted    Penicillins Anaphylaxis 05/13/2015        Pre-medication:  N/A    Subjective:     Pt reported no pain before, during, or after wound care treatment.      Objective:      Pt received pulsavac to R foot with 500 mL of NS.  R foot wound bed and periwound cleaned with moistened Vashe 4x4 gauze.  Cavilon no sting barrier film applied to periwound.  R foot completely dried.  R foot wound bed applied with betadine soaked 4x4 gauze and covered with multiple dry 4x4 gauze.  Dry 4x4 gauze also placed between toes.  R foot wrapped with 2 cast padding and ACE wrap, then secured with tape.  Clean technique maintained throughout treatment.  Cam walker boot reapplied to RLE.      Assessment:     Old dressings removed with minimal serous drainage.  R foot wound bed presented with minimal biofilm and pink tissues.  Periwound presented with min-moderate maceration, min edema, and maximum callus.  No odor present.  After pulsavac and cleaning wound bed with moistened Vashe 4x4 gauze, there are a few spots of red tissues.  Patient tolerated today's treatment without any adverse effects.  Patient with diagnosis of R foot wound will benefit from skilled Rehab Services Wound Care to maximize wound healing potential and to assist wound to heal through appropriate healing phases.  Problems include multiple co-morbidities, diabetes, edema, excessive wound moisture and macerated tissue, decreased granulation tissue, and decreased protective sensation.     Photodocumentation:    R medial-plantar forefoot wound          R foot  dorsal-medial view        R foot medial-plantar view          Plan:    Goals remain appropriate.  Continue with Plan of Care.

## 2024-07-06 NOTE — PLAN OF CARE
Case Management Assessment     PCP: Lyle Thorne  Ph[facility]acy: [facility]     Patient Arrived From: home   Existing Help at Home: none    Barriers to Discharge: none     Discharge Plan:    A. Home with family   B. Home with family    SW completed initial assessment and discussed discharge planning with patient at his bedside. Patient lives with his spouse who is his main support. Patient's spouse will provide transportation for him to get home when discharge from the hospital.       07/06/24 5215   Discharge Assessment   Assessment Type Discharge Planning Assessment   Confirmed/corrected address, phone number and insurance Yes   Confirmed Demographics Correct on Facesheet   Source of Information patient   Communicated MAURO with patient/caregiver Date not available/Unable to determine   Reason For Admission Acute Osteomyelitis   People in Home spouse   Do you expect to return to your current living situation? Yes   Do you have help at home or someone to help you manage your care at home? Yes   Who are your caregiver(s) and their phone number(s)? Marysol Olmstead (Significant other)  [phone] (Mobile)   Prior to hospitilization cognitive status: Alert/Oriented   Current cognitive status: Alert/Oriented   Walking or Climbing Stairs Difficulty yes   Walking or Climbing Stairs ambulation difficulty, requires equipment   Equipment Currently Used at Home none   Readmission within 30 days? No   Patient currently being followed by outpatient case management? No   Do you currently have service(s) that help you manage your care at home? No   Do you take prescription medications? Yes   Do you have prescription coverage? No   Do you have any problems affording any of your prescribed medications? No   Is the patient taking medications as prescribed? yes   Who is going to help you get home at discharge? Marysol Olmstead (Significant other)  [phone] (Mobile)   How do you get to doctors appointments? family or  friend will provide   Are you on dialysis? No   Do you take coumadin? No   Discharge Plan A Home with family   Discharge Plan B Home with family   DME Needed Upon Discharge  none   Discharge Plan discussed with: Patient   Transition of Care Barriers None   OTHER   Name(s) of People in Home Marysol Olmstead (Significant other)  [phone] (Mobile)

## 2024-07-07 PROBLEM — E66.09 CLASS 1 OBESITY DUE TO EXCESS CALORIES WITH SERIOUS COMORBIDITY AND BODY MASS INDEX (BMI) OF 31.0 TO 31.9 IN ADULT: Status: ACTIVE | Noted: 2024-07-07

## 2024-07-07 PROBLEM — E66.811 CLASS 1 OBESITY DUE TO EXCESS CALORIES WITH SERIOUS COMORBIDITY AND BODY MASS INDEX (BMI) OF 31.0 TO 31.9 IN ADULT: Status: ACTIVE | Noted: 2024-07-07

## 2024-07-07 LAB
ALBUMIN SERPL BCP-MCNC: 1.9 G/DL (ref 3.5–5.2)
ALP SERPL-CCNC: 151 U/L (ref 55–135)
ALT SERPL W/O P-5'-P-CCNC: 22 U/L (ref 10–44)
ANION GAP SERPL CALC-SCNC: 9 MMOL/L (ref 8–16)
AST SERPL-CCNC: 29 U/L (ref 10–40)
BACTERIA SPEC ANAEROBE CULT: NORMAL
BACTERIA SPEC ANAEROBE CULT: NORMAL
BASOPHILS # BLD AUTO: 0.04 K/UL (ref 0–0.2)
BASOPHILS NFR BLD: 0.5 % (ref 0–1.9)
BILIRUB SERPL-MCNC: 0.2 MG/DL (ref 0.1–1)
BUN SERPL-MCNC: 11 MG/DL (ref 6–20)
CALCIUM SERPL-MCNC: 9.6 MG/DL (ref 8.7–10.5)
CHLORIDE SERPL-SCNC: 103 MMOL/L (ref 95–110)
CO2 SERPL-SCNC: 25 MMOL/L (ref 23–29)
CREAT SERPL-MCNC: 0.8 MG/DL (ref 0.5–1.4)
DIFFERENTIAL METHOD BLD: ABNORMAL
EOSINOPHIL # BLD AUTO: 0.2 K/UL (ref 0–0.5)
EOSINOPHIL NFR BLD: 2.5 % (ref 0–8)
ERYTHROCYTE [DISTWIDTH] IN BLOOD BY AUTOMATED COUNT: 13 % (ref 11.5–14.5)
EST. GFR  (NO RACE VARIABLE): >60 ML/MIN/1.73 M^2
GLUCOSE SERPL-MCNC: 131 MG/DL (ref 70–110)
HCT VFR BLD AUTO: 31.6 % (ref 40–54)
HGB BLD-MCNC: 9.9 G/DL (ref 14–18)
IMM GRANULOCYTES # BLD AUTO: 0.11 K/UL (ref 0–0.04)
IMM GRANULOCYTES NFR BLD AUTO: 1.4 % (ref 0–0.5)
LYMPHOCYTES # BLD AUTO: 1.7 K/UL (ref 1–4.8)
LYMPHOCYTES NFR BLD: 21.1 % (ref 18–48)
MAGNESIUM SERPL-MCNC: 1.7 MG/DL (ref 1.6–2.6)
MCH RBC QN AUTO: 26.8 PG (ref 27–31)
MCHC RBC AUTO-ENTMCNC: 31.3 G/DL (ref 32–36)
MCV RBC AUTO: 86 FL (ref 82–98)
MONOCYTES # BLD AUTO: 0.7 K/UL (ref 0.3–1)
MONOCYTES NFR BLD: 8.5 % (ref 4–15)
NEUTROPHILS # BLD AUTO: 5.3 K/UL (ref 1.8–7.7)
NEUTROPHILS NFR BLD: 66 % (ref 38–73)
NRBC BLD-RTO: 0 /100 WBC
PHOSPHATE SERPL-MCNC: 3.8 MG/DL (ref 2.7–4.5)
PLATELET # BLD AUTO: 331 K/UL (ref 150–450)
PMV BLD AUTO: 10.4 FL (ref 9.2–12.9)
POCT GLUCOSE: 139 MG/DL (ref 70–110)
POCT GLUCOSE: 259 MG/DL (ref 70–110)
POCT GLUCOSE: 267 MG/DL (ref 70–110)
POCT GLUCOSE: 278 MG/DL (ref 70–110)
POTASSIUM SERPL-SCNC: 4.3 MMOL/L (ref 3.5–5.1)
PROT SERPL-MCNC: 8.4 G/DL (ref 6–8.4)
RBC # BLD AUTO: 3.69 M/UL (ref 4.6–6.2)
SODIUM SERPL-SCNC: 137 MMOL/L (ref 136–145)
WBC # BLD AUTO: 8.02 K/UL (ref 3.9–12.7)

## 2024-07-07 PROCEDURE — 84100 ASSAY OF PHOSPHORUS: CPT | Performed by: STUDENT IN AN ORGANIZED HEALTH CARE EDUCATION/TRAINING PROGRAM

## 2024-07-07 PROCEDURE — 25000003 PHARM REV CODE 250: Performed by: STUDENT IN AN ORGANIZED HEALTH CARE EDUCATION/TRAINING PROGRAM

## 2024-07-07 PROCEDURE — 36415 COLL VENOUS BLD VENIPUNCTURE: CPT | Performed by: STUDENT IN AN ORGANIZED HEALTH CARE EDUCATION/TRAINING PROGRAM

## 2024-07-07 PROCEDURE — 63600175 PHARM REV CODE 636 W HCPCS: Performed by: STUDENT IN AN ORGANIZED HEALTH CARE EDUCATION/TRAINING PROGRAM

## 2024-07-07 PROCEDURE — 85025 COMPLETE CBC W/AUTO DIFF WBC: CPT | Performed by: STUDENT IN AN ORGANIZED HEALTH CARE EDUCATION/TRAINING PROGRAM

## 2024-07-07 PROCEDURE — 97597 DBRDMT OPN WND 1ST 20 CM/<: CPT

## 2024-07-07 PROCEDURE — 83735 ASSAY OF MAGNESIUM: CPT | Performed by: STUDENT IN AN ORGANIZED HEALTH CARE EDUCATION/TRAINING PROGRAM

## 2024-07-07 PROCEDURE — 80053 COMPREHEN METABOLIC PANEL: CPT | Performed by: STUDENT IN AN ORGANIZED HEALTH CARE EDUCATION/TRAINING PROGRAM

## 2024-07-07 PROCEDURE — 11000001 HC ACUTE MED/SURG PRIVATE ROOM

## 2024-07-07 RX ORDER — AMLODIPINE BESYLATE 5 MG/1
10 TABLET ORAL DAILY
Status: DISCONTINUED | OUTPATIENT
Start: 2024-07-08 | End: 2024-07-08 | Stop reason: HOSPADM

## 2024-07-07 RX ADMIN — INSULIN GLARGINE 24 UNITS: 100 INJECTION, SOLUTION SUBCUTANEOUS at 08:07

## 2024-07-07 RX ADMIN — METRONIDAZOLE 500 MG: 500 TABLET ORAL at 08:07

## 2024-07-07 RX ADMIN — CEFTRIAXONE 2 G: 2 INJECTION, POWDER, FOR SOLUTION INTRAMUSCULAR; INTRAVENOUS at 02:07

## 2024-07-07 RX ADMIN — INSULIN ASPART 3 UNITS: 100 INJECTION, SOLUTION INTRAVENOUS; SUBCUTANEOUS at 04:07

## 2024-07-07 RX ADMIN — GABAPENTIN 100 MG: 100 CAPSULE ORAL at 02:07

## 2024-07-07 RX ADMIN — INSULIN ASPART 8 UNITS: 100 INJECTION, SOLUTION INTRAVENOUS; SUBCUTANEOUS at 11:07

## 2024-07-07 RX ADMIN — ACETAMINOPHEN 1000 MG: 500 TABLET, FILM COATED ORAL at 02:07

## 2024-07-07 RX ADMIN — INSULIN GLARGINE 24 UNITS: 100 INJECTION, SOLUTION SUBCUTANEOUS at 09:07

## 2024-07-07 RX ADMIN — GABAPENTIN 100 MG: 100 CAPSULE ORAL at 08:07

## 2024-07-07 RX ADMIN — GABAPENTIN 100 MG: 100 CAPSULE ORAL at 09:07

## 2024-07-07 RX ADMIN — AMLODIPINE BESYLATE 5 MG: 5 TABLET ORAL at 08:07

## 2024-07-07 RX ADMIN — INSULIN ASPART 8 UNITS: 100 INJECTION, SOLUTION INTRAVENOUS; SUBCUTANEOUS at 04:07

## 2024-07-07 RX ADMIN — INSULIN ASPART 8 UNITS: 100 INJECTION, SOLUTION INTRAVENOUS; SUBCUTANEOUS at 08:07

## 2024-07-07 RX ADMIN — Medication 6 MG: at 09:07

## 2024-07-07 RX ADMIN — METRONIDAZOLE 500 MG: 500 TABLET ORAL at 09:07

## 2024-07-07 RX ADMIN — ACETAMINOPHEN 1000 MG: 500 TABLET, FILM COATED ORAL at 09:07

## 2024-07-07 RX ADMIN — INSULIN ASPART 1 UNITS: 100 INJECTION, SOLUTION INTRAVENOUS; SUBCUTANEOUS at 09:07

## 2024-07-07 RX ADMIN — INSULIN ASPART 3 UNITS: 100 INJECTION, SOLUTION INTRAVENOUS; SUBCUTANEOUS at 11:07

## 2024-07-07 NOTE — PLAN OF CARE
Plan of care and expectation from team discussed with aptient. Patient out of bed multiple times to chair, denies pain to foot. Wound care done today by PT wound care. IV abx continued. Afebrile during shift. CBG monitored. PRN insulin given.       Problem: Adult Inpatient Plan of Care  Goal: Plan of Care Review  Outcome: Progressing  Goal: Patient-Specific Goal (Individualized)  Outcome: Progressing  Goal: Absence of Hospital-Acquired Illness or Injury  Outcome: Progressing  Goal: Optimal Comfort and Wellbeing  Outcome: Progressing  Goal: Readiness for Transition of Care  Outcome: Progressing     Problem: Diabetes Comorbidity  Goal: Blood Glucose Level Within Targeted Range  Outcome: Progressing     Problem: Wound  Goal: Optimal Coping  Outcome: Progressing  Goal: Optimal Functional Ability  Outcome: Progressing  Goal: Absence of Infection Signs and Symptoms  Outcome: Progressing  Goal: Improved Oral Intake  Outcome: Progressing  Goal: Optimal Pain Control and Function  Outcome: Progressing  Goal: Skin Health and Integrity  Outcome: Progressing  Goal: Optimal Wound Healing  Outcome: Progressing

## 2024-07-07 NOTE — PT/OT/SLP PROGRESS
Rehab Services Wound Care Progress Note     Hany Guerin  [id number]  7/7/2024 [id number](40 min - 1 wound care)     Diagnosis:     History of current illness and wound.  Patient is a 48 y.o. male s/p R foot I+D by Podiatry on 7/3/24.         Precautions: Standard, Diabetes, and Weightbearing R heel WB with cam boot for transfers only            Allergies as of 07/02/2024 - Reviewed 07/02/2024   Allergen Reaction Noted    Penicillins Anaphylaxis 05/13/2015         Pre-medication:  N/A     Subjective:      Pt reported no pain before, during, or after wound care treatment.      Objective:      Pt received pulsavac to R foot with 500 mL of NS.  R foot wound bed and periwound cleaned with moistened Vashe 4x4 gauze.  Cavilon no sting barrier film applied to periwound.  R foot completely dried.  R foot wound bed applied with betadine soaked 4x4 gauze and covered with multiple dry 4x4 gauze.  Dry 4x4 gauze also placed between toes. R foot wrapped with 2 cast padding and ACE wrap, then secured with tape. Clean technique maintained throughout treatment.  Cam walker boot reapplied to RLE.      Assessment:     Old dressings removed with minimal serous drainage. R foot wound bed presented with minimal biofilm and pink tissues.  Periwound presented with min-moderate maceration, min edema, and maximum callus.  No odor present.  After pulsavac and cleaning wound bed with moistened Vashe 4x4 gauze, there are a few spots of red tissues.  Patient tolerated today's treatment without any adverse effects.  Patient with diagnosis of R foot wound will benefit from skilled Rehab Services Wound Care to maximize wound healing potential and to assist wound to heal through appropriate healing phases.  Problems include multiple co-morbidities, diabetes, edema, excessive wound moisture and macerated tissue, decreased granulation tissue, and decreased protective sensation.        Plan:     Goals remain appropriate.  Continue with Plan of  Care.

## 2024-07-07 NOTE — ASSESSMENT & PLAN NOTE
Xray: soft tissue swelling bony destruction and gas seen in the portions of the foot along the 1st metatarsal.   He has had 1st R toe amp previously  Sodium 130. Hb dropped to 15 to 9.4, SED rate 119 & CRP elevated  WBC and Cr in normal limits however  LRINEC score 9, high risk for necrotizing soft tissue infx (>93%)  MRI forefoot and midfoot: Midfoot and forefoot cellulitis and osteomyelitis of the 1st metatarsal stump.   Control DM better  Podiatry consulted- pt s/p bedside debridement on 7/3 with culture growing group G strep, surgical bone cultures also growing group G strep. Bone path pending.   ID consulted-recommends to continue ceftriaxone 2 grams q24 hours x 6 weeks and start PO metronidazole 500 q12 hours for empiric anaerobic coverage.

## 2024-07-07 NOTE — SUBJECTIVE & OBJECTIVE
Interval History: no acute overnight events. Remained afebrile. Pain well controlled.    Review of Systems   Constitutional:  Negative for fatigue and fever.   Gastrointestinal:  Negative for diarrhea, nausea and vomiting.   Skin:  Positive for wound.     Objective:     Vital Signs (Most Recent):  Temp: 98.6 °F (37 °C) (07/07/24 0708)  Pulse: 83 (07/07/24 0708)  Resp: 17 (07/07/24 0708)  BP: (!) 157/89 (07/07/24 0708)  SpO2: 96 % (07/07/24 0708) Vital Signs (24h Range):  Temp:  [98.6 °F (37 °C)-99.4 °F (37.4 °C)] 98.6 °F (37 °C)  Pulse:  [80-89] 83  Resp:  [16-20] 17  SpO2:  [96 %-99 %] 96 %  BP: (129-157)/(70-89) 157/89     Weight: 89.2 kg (196 lb 10.4 oz)  Body mass index is 31.74 kg/m².    Intake/Output Summary (Last 24 hours) at 7/7/2024 1023  Last data filed at 7/7/2024 0829  Gross per 24 hour   Intake 1090.61 ml   Output --   Net 1090.61 ml         Physical Exam  Vitals and nursing note reviewed.   Constitutional:       General: He is not in acute distress.     Appearance: He is obese.   Cardiovascular:      Rate and Rhythm: Normal rate and regular rhythm.   Pulmonary:      Effort: Pulmonary effort is normal. No respiratory distress.   Musculoskeletal:      Comments: R foot wrapped   Skin:     Findings: No rash.   Neurological:      General: No focal deficit present.      Mental Status: He is alert and oriented to person, place, and time.   Psychiatric:         Mood and Affect: Mood normal.         Thought Content: Thought content normal.             Significant Labs: All pertinent labs within the past 24 hours have been reviewed.    Significant Imaging: I have reviewed all pertinent imaging results/findings within the past 24 hours.

## 2024-07-07 NOTE — ASSESSMENT & PLAN NOTE
Body mass index is 31.74 kg/m². Morbid obesity complicates all aspects of disease management from diagnostic modalities to treatment. Weight loss encouraged and health benefits explained to patient.

## 2024-07-07 NOTE — ASSESSMENT & PLAN NOTE
Patient with uncontrolled DM with glucoses >500 on arrival. A1C >14  Not in DKA, normal pH and no ketones in urine  Needs better control of glucoses, to reduce infections- counseled   He lost his job/insurance, now applying for medicaid   Meds: basal bolus insulin + SSI PRN to maintain goal 140-180  ADA diet, accuchecks ACHS, hypoglycemic protocol

## 2024-07-07 NOTE — PROGRESS NOTES
[facility]  Progress Note    Patient Name: Hany Guerin  MRN: [id number]  Patient Class: IP- Inpatient   Admission Date: 7/2/2024  Length of Stay: 5 days  Attending Physician: Zara Rush DO  Primary Care Provider: Lyle Thorne MD        Subjective:     Principal Problem:Acute osteomyelitis of metatarsal bone of right foot        HPI:    Hany Guerin is a 48 y.o. male who has a past medical history of Diabetes mellitus, High cholesterol, and Hypertension, presented to the ED with CC of Ankle Pain after fall.       No fracture or significant pathology was discovered of his ankle other than potential avascular necrosis of distal tibia; however, Xray did show soft tissue swelling bony destruction and gas seen in the portions of the foot along the 1st metatarsal. Patient has had long langford with osteomyelitis in the past of the right foot. He has had 1st R toe amp previously. Does still have drainage from foot, constantly. He has not been to wound care in over a year. Has not been on antibiotics for 2 years. And is not taking any insulin and his glucose is >500 on admission. He is not in DKA; no ketones in urine and pH wnl. Sodium 130. Hb dropped to 15 to 9.4, SED rate 119 & CRP elevated, WBC and Cr in normal limits however (LRINEC score 9). Podiatry and ID consulted. PEN allergy, anaphylaxis. Started on Vanc+Tomi.         Overview/Hospital Course:  48 y.o. male who has a past medical history of uncontrolled Diabetes mellitus (A1c>14) admitted 07/02/2024 for midfoot and forefoot cellulitis and osteomyelitis of the 1st metatarsal stump. Podiatry consulted- pt s/p bedside debridement on 7/3 with culture growing group G strep, surgical bone cultures also growing group G strep. Bone path pending. ID consulted-recommends to continue ceftriaxone 2 grams q24 hours x 6 weeks and start PO metronidazole 500 q12 hours for empiric anaerobic coverage. Unfortunately, he does not  have insurance, so awaitig for medicaid acceptance before he can discharge. Will need insulin on discharge as well.       Interval History: no acute overnight events. Remained afebrile. Pain well controlled.    Review of Systems   Constitutional:  Negative for fatigue and fever.   Gastrointestinal:  Negative for diarrhea, nausea and vomiting.   Skin:  Positive for wound.     Objective:     Vital Signs (Most Recent):  Temp: 98.6 °F (37 °C) (07/07/24 0708)  Pulse: 83 (07/07/24 0708)  Resp: 17 (07/07/24 0708)  BP: (!) 157/89 (07/07/24 0708)  SpO2: 96 % (07/07/24 0708) Vital Signs (24h Range):  Temp:  [98.6 °F (37 °C)-99.4 °F (37.4 °C)] 98.6 °F (37 °C)  Pulse:  [80-89] 83  Resp:  [16-20] 17  SpO2:  [96 %-99 %] 96 %  BP: (129-157)/(70-89) 157/89     Weight: 89.2 kg (196 lb 10.4 oz)  Body mass index is 31.74 kg/m².    Intake/Output Summary (Last 24 hours) at 7/7/2024 1023  Last data filed at 7/7/2024 0829  Gross per 24 hour   Intake 1090.61 ml   Output --   Net 1090.61 ml         Physical Exam  Vitals and nursing note reviewed.   Constitutional:       General: He is not in acute distress.     Appearance: He is obese.   Cardiovascular:      Rate and Rhythm: Normal rate and regular rhythm.   Pulmonary:      Effort: Pulmonary effort is normal. No respiratory distress.   Musculoskeletal:      Comments: R foot wrapped   Skin:     Findings: No rash.   Neurological:      General: No focal deficit present.      Mental Status: He is alert and oriented to person, place, and time.   Psychiatric:         Mood and Affect: Mood normal.         Thought Content: Thought content normal.             Significant Labs: All pertinent labs within the past 24 hours have been reviewed.    Significant Imaging: I have reviewed all pertinent imaging results/findings within the past 24 hours.    Assessment/Plan:      * Acute osteomyelitis of 1st metatarsal bone of right foot  Xray: soft tissue swelling bony destruction and gas seen in the portions  of the foot along the 1st metatarsal.   He has had 1st R toe amp previously  Sodium 130. Hb dropped to 15 to 9.4, SED rate 119 & CRP elevated  WBC and Cr in normal limits however  LRINEC score 9, high risk for necrotizing soft tissue infx (>93%)  MRI forefoot and midfoot: Midfoot and forefoot cellulitis and osteomyelitis of the 1st metatarsal stump.   Control DM better  Podiatry consulted- pt s/p bedside debridement on 7/3 with culture growing group G strep, surgical bone cultures also growing group G strep. Bone path pending.   ID consulted-recommends to continue ceftriaxone 2 grams q24 hours x 6 weeks and start PO metronidazole 500 q12 hours for empiric anaerobic coverage.     Type 2 diabetes mellitus with hyperglycemia, without long-term current use of insulin  Patient with uncontrolled DM with glucoses >500 on arrival. A1C >14  Not in DKA, normal pH and no ketones in urine  Needs better control of glucoses, to reduce infections- counseled   He lost his job/insurance, now applying for medicaid   Meds: basal bolus insulin + SSI PRN to maintain goal 140-180  ADA diet, accuchecks ACHS, hypoglycemic protocol      Benign essential HTN  Continue amlodipine, increased to 10mg on 07/07 for better BP control     Tobacco dependence due to cigarettes  Nicotine patch ordered  4m counseling: The following was discussed concerning tobacco use:  Relevance of Quitting  Risk to Health  Long Term Risk  Risk for Others  Rewards of Quitting  Motivation Intervention to Quit    Class 1 obesity due to excess calories with serious comorbidity and body mass index (BMI) of 31.0 to 31.9 in adult  Body mass index is 31.74 kg/m². Morbid obesity complicates all aspects of disease management from diagnostic modalities to treatment. Weight loss encouraged and health benefits explained to patient.           VTE Risk Mitigation (From admission, onward)           Ordered     IP VTE HIGH RISK PATIENT  Once         07/02/24 1525     Place sequential  compression device  Until discontinued         07/02/24 1525                    Discharge Planning   MAURO:      Code Status: Full Code   Is the patient medically ready for discharge?:     Reason for patient still in hospital (select all that apply): Patient trending condition, Treatment, and Consult recommendations  Discharge Plan A: Home with family                  Zara Rush DO  Department of Hospital Medicine   [facility]

## 2024-07-08 VITALS
HEART RATE: 87 BPM | WEIGHT: 196.63 LBS | BODY MASS INDEX: 31.6 KG/M2 | DIASTOLIC BLOOD PRESSURE: 78 MMHG | HEIGHT: 66 IN | TEMPERATURE: 99 F | SYSTOLIC BLOOD PRESSURE: 135 MMHG | RESPIRATION RATE: 18 BRPM | OXYGEN SATURATION: 95 %

## 2024-07-08 PROBLEM — U07.1 COVID-19: Status: RESOLVED | Noted: 2021-08-18 | Resolved: 2024-07-08

## 2024-07-08 LAB
ALBUMIN SERPL BCP-MCNC: 1.9 G/DL (ref 3.5–5.2)
ALP SERPL-CCNC: 163 U/L (ref 55–135)
ALT SERPL W/O P-5'-P-CCNC: 23 U/L (ref 10–44)
ANION GAP SERPL CALC-SCNC: 9 MMOL/L (ref 8–16)
AST SERPL-CCNC: 31 U/L (ref 10–40)
BASOPHILS # BLD AUTO: 0.03 K/UL (ref 0–0.2)
BASOPHILS NFR BLD: 0.4 % (ref 0–1.9)
BILIRUB SERPL-MCNC: 0.1 MG/DL (ref 0.1–1)
BUN SERPL-MCNC: 13 MG/DL (ref 6–20)
CALCIUM SERPL-MCNC: 9.5 MG/DL (ref 8.7–10.5)
CHLORIDE SERPL-SCNC: 104 MMOL/L (ref 95–110)
CO2 SERPL-SCNC: 25 MMOL/L (ref 23–29)
CREAT SERPL-MCNC: 0.8 MG/DL (ref 0.5–1.4)
DIFFERENTIAL METHOD BLD: ABNORMAL
EOSINOPHIL # BLD AUTO: 0.2 K/UL (ref 0–0.5)
EOSINOPHIL NFR BLD: 3.1 % (ref 0–8)
ERYTHROCYTE [DISTWIDTH] IN BLOOD BY AUTOMATED COUNT: 12.9 % (ref 11.5–14.5)
EST. GFR  (NO RACE VARIABLE): >60 ML/MIN/1.73 M^2
GLUCOSE SERPL-MCNC: 134 MG/DL (ref 70–110)
HCT VFR BLD AUTO: 33.2 % (ref 40–54)
HGB BLD-MCNC: 10.1 G/DL (ref 14–18)
IMM GRANULOCYTES # BLD AUTO: 0.1 K/UL (ref 0–0.04)
IMM GRANULOCYTES NFR BLD AUTO: 1.4 % (ref 0–0.5)
LYMPHOCYTES # BLD AUTO: 1.7 K/UL (ref 1–4.8)
LYMPHOCYTES NFR BLD: 24.1 % (ref 18–48)
MAGNESIUM SERPL-MCNC: 1.8 MG/DL (ref 1.6–2.6)
MCH RBC QN AUTO: 26.1 PG (ref 27–31)
MCHC RBC AUTO-ENTMCNC: 30.4 G/DL (ref 32–36)
MCV RBC AUTO: 86 FL (ref 82–98)
MONOCYTES # BLD AUTO: 0.7 K/UL (ref 0.3–1)
MONOCYTES NFR BLD: 9 % (ref 4–15)
NEUTROPHILS # BLD AUTO: 4.5 K/UL (ref 1.8–7.7)
NEUTROPHILS NFR BLD: 62 % (ref 38–73)
NRBC BLD-RTO: 0 /100 WBC
PHOSPHATE SERPL-MCNC: 4 MG/DL (ref 2.7–4.5)
PLATELET # BLD AUTO: 325 K/UL (ref 150–450)
PMV BLD AUTO: 10.2 FL (ref 9.2–12.9)
POCT GLUCOSE: 161 MG/DL (ref 70–110)
POCT GLUCOSE: 269 MG/DL (ref 70–110)
POCT GLUCOSE: 376 MG/DL (ref 70–110)
POTASSIUM SERPL-SCNC: 4.1 MMOL/L (ref 3.5–5.1)
PROT SERPL-MCNC: 8 G/DL (ref 6–8.4)
RBC # BLD AUTO: 3.87 M/UL (ref 4.6–6.2)
SODIUM SERPL-SCNC: 138 MMOL/L (ref 136–145)
WBC # BLD AUTO: 7.21 K/UL (ref 3.9–12.7)

## 2024-07-08 PROCEDURE — S4991 NICOTINE PATCH NONLEGEND: HCPCS | Performed by: STUDENT IN AN ORGANIZED HEALTH CARE EDUCATION/TRAINING PROGRAM

## 2024-07-08 PROCEDURE — 85025 COMPLETE CBC W/AUTO DIFF WBC: CPT | Performed by: STUDENT IN AN ORGANIZED HEALTH CARE EDUCATION/TRAINING PROGRAM

## 2024-07-08 PROCEDURE — 94761 N-INVAS EAR/PLS OXIMETRY MLT: CPT

## 2024-07-08 PROCEDURE — 63600175 PHARM REV CODE 636 W HCPCS: Performed by: STUDENT IN AN ORGANIZED HEALTH CARE EDUCATION/TRAINING PROGRAM

## 2024-07-08 PROCEDURE — 84100 ASSAY OF PHOSPHORUS: CPT | Performed by: STUDENT IN AN ORGANIZED HEALTH CARE EDUCATION/TRAINING PROGRAM

## 2024-07-08 PROCEDURE — 36415 COLL VENOUS BLD VENIPUNCTURE: CPT | Performed by: STUDENT IN AN ORGANIZED HEALTH CARE EDUCATION/TRAINING PROGRAM

## 2024-07-08 PROCEDURE — 25000003 PHARM REV CODE 250: Performed by: STUDENT IN AN ORGANIZED HEALTH CARE EDUCATION/TRAINING PROGRAM

## 2024-07-08 PROCEDURE — 83735 ASSAY OF MAGNESIUM: CPT | Performed by: STUDENT IN AN ORGANIZED HEALTH CARE EDUCATION/TRAINING PROGRAM

## 2024-07-08 PROCEDURE — 80053 COMPREHEN METABOLIC PANEL: CPT | Performed by: STUDENT IN AN ORGANIZED HEALTH CARE EDUCATION/TRAINING PROGRAM

## 2024-07-08 PROCEDURE — 99232 SBSQ HOSP IP/OBS MODERATE 35: CPT | Mod: ,,, | Performed by: PODIATRIST

## 2024-07-08 RX ORDER — LEVOFLOXACIN 750 MG/1
750 TABLET ORAL DAILY
Qty: 30 TABLET | Refills: 1 | Status: SHIPPED | OUTPATIENT
Start: 2024-07-08 | End: 2024-08-13

## 2024-07-08 RX ORDER — METRONIDAZOLE 500 MG/1
500 TABLET ORAL EVERY 12 HOURS
Qty: 60 TABLET | Refills: 1 | Status: SHIPPED | OUTPATIENT
Start: 2024-07-08 | End: 2024-08-13

## 2024-07-08 RX ORDER — INSULIN HUMAN 100 [IU]/ML
30 INJECTION, SUSPENSION SUBCUTANEOUS 2 TIMES DAILY
Qty: 15 ML | Refills: 11 | Status: SHIPPED | OUTPATIENT
Start: 2024-07-08 | End: 2025-07-08

## 2024-07-08 RX ORDER — DEXTROSE 4 G
TABLET,CHEWABLE ORAL
Qty: 1 EACH | Refills: 0 | Status: SHIPPED | OUTPATIENT
Start: 2024-07-08

## 2024-07-08 RX ORDER — AMLODIPINE AND OLMESARTAN MEDOXOMIL 10; 20 MG/1; MG/1
1 TABLET ORAL DAILY
Qty: 90 TABLET | Refills: 3 | Status: SHIPPED | OUTPATIENT
Start: 2024-07-08 | End: 2025-07-08

## 2024-07-08 RX ORDER — BLOOD-GLUCOSE CONTROL, NORMAL
1 EACH MISCELLANEOUS 2 TIMES DAILY
Qty: 200 EACH | Refills: 10 | Status: SHIPPED | OUTPATIENT
Start: 2024-07-08

## 2024-07-08 RX ADMIN — INSULIN ASPART 8 UNITS: 100 INJECTION, SOLUTION INTRAVENOUS; SUBCUTANEOUS at 11:07

## 2024-07-08 RX ADMIN — ACETAMINOPHEN 1000 MG: 500 TABLET, FILM COATED ORAL at 01:07

## 2024-07-08 RX ADMIN — GABAPENTIN 100 MG: 100 CAPSULE ORAL at 03:07

## 2024-07-08 RX ADMIN — CEFTRIAXONE 2 G: 2 INJECTION, POWDER, FOR SOLUTION INTRAMUSCULAR; INTRAVENOUS at 03:07

## 2024-07-08 RX ADMIN — Medication 1 PATCH: at 08:07

## 2024-07-08 RX ADMIN — AMLODIPINE BESYLATE 10 MG: 5 TABLET ORAL at 08:07

## 2024-07-08 RX ADMIN — INSULIN ASPART 5 UNITS: 100 INJECTION, SOLUTION INTRAVENOUS; SUBCUTANEOUS at 11:07

## 2024-07-08 RX ADMIN — INSULIN GLARGINE 24 UNITS: 100 INJECTION, SOLUTION SUBCUTANEOUS at 08:07

## 2024-07-08 RX ADMIN — INSULIN ASPART 8 UNITS: 100 INJECTION, SOLUTION INTRAVENOUS; SUBCUTANEOUS at 08:07

## 2024-07-08 RX ADMIN — GABAPENTIN 100 MG: 100 CAPSULE ORAL at 08:07

## 2024-07-08 RX ADMIN — METRONIDAZOLE 500 MG: 500 TABLET ORAL at 08:07

## 2024-07-08 NOTE — NURSING
[facility]  Nurses Note -- 4 Eyes      7/8/2024       Skin assessed on: Q Shift      [x] No Pressure Injuries Present    [x]Prevention Measures Documented    [] Yes LDA  for Pressure Injury Previously documented     [] Yes New Pressure Injury Discovered   [] LDA for New Pressure Injury Added      Attending RN:  Berenice Chase LPN     Second RN:  JIM Sebastian

## 2024-07-08 NOTE — PT/OT/SLP PROGRESS
Physical Therapy      Patient Name:  Hany Guerin   MRN:  [id number]    Patient not seen today secondary to (Pt did not received PT pulsavac wound care today. Podiatry just performed wound care ~1 hr ago. Pt sitting up in chair and getting ready to be D/C'ed home today. Pt expressed no concerns or needs at this time, just waiting on medications to be delivered from pharmacy.).

## 2024-07-08 NOTE — DISCHARGE SUMMARY
[facility] Medicine  Discharge Summary      Patient Name: Hany Guerin  MRN: [id number]  [facility]: [id number]  Patient Class: IP- Inpatient  Admission Date: 7/2/2024  Hospital Length of Stay: 6 days  Discharge Date and Time:  07/08/2024 12:53 PM  Attending Physician: Mary Carmen Mancuso MD   Discharging Provider: Mary Carmen Mancuso MD  Primary Care Provider: Lyle Thorne MD    Primary Care Team: Networked reference to record PCT     HPI:     Hany Guerin is a 48 y.o. male who has a past medical history of Diabetes mellitus, High cholesterol, and Hypertension, presented to the ED with CC of Ankle Pain after fall.       No fracture or significant pathology was discovered of his ankle other than potential avascular necrosis of distal tibia; however, Xray did show soft tissue swelling bony destruction and gas seen in the portions of the foot along the 1st metatarsal. Patient has had long langford with osteomyelitis in the past of the right foot. He has had 1st R toe amp previously. Does still have drainage from foot, constantly. He has not been to wound care in over a year. Has not been on antibiotics for 2 years. And is not taking any insulin and his glucose is >500 on admission. He is not in DKA; no ketones in urine and pH wnl. Sodium 130. Hb dropped to 15 to 9.4, SED rate 119 & CRP elevated, WBC and Cr in normal limits however (LRINEC score 9). Podiatry and ID consulted. PEN allergy, anaphylaxis. Started on Vanc+Tmoi.         Procedure(s) (LRB):  IRRIGATION AND DEBRIDEMENT (Right)      Hospital Course:   Mr Hany Guerin is a 48 y.o. man with uncontrolled Diabetes mellitus (A1c>14, not on treatment) admitted 07/02/2024 for midfoot and forefoot cellulitis and osteomyelitis of the 1st metatarsal stump. Podiatry consulted- pt s/p bedside debridement on 7/3 with culture growing group G strep, surgical bone cultures also growing group G strep. ID consulted-recommends to continue  ceftriaxone 2 grams q24 hours x 6 weeks and start PO metronidazole 500 q12 hours for empiric anaerobic coverage. Unfortunately, he does not have insurance. He has applied for medicaid which is pending. He states he must leave the hospital on 7/8/24 for work. Asked if I could write him a note; he stated no thank you. Discussed that I cannot set up outpatient IV antibiotics, wound care, home health with no insurance. Discussed using PO levofloxacin instead of IV ceftriaxone for Group G Strep osteomyelitis. Discussed risk of tendon rupture. Qtc appropriate. He agrees. Plan levofloxacin 750mg QD + flagyl 500mg BID until 8/13/24. Discussed wound care regimen as outlined by Podiatry. Discussed no weight bearing status on right foot. Discussed need for follow up which will need to be at [facility] until insurance comes through. Discussed A1c 14%. He is not actively taking any Rx at home. Prescribed insulin to pharmacy for price checking along with all supplies. He is stable for discharge to home.      Goals of Care Treatment Preferences:  Code Status: Full Code      Consults:   Consults (From admission, onward)          Status Ordering Provider     Inpatient consult to Podiatry  Once        Provider:  Ayla Cordova DPM    Completed MARKUS VENEGAS     Inpatient consult to Infectious Diseases  Once        Provider:  Houston Nielson MD    Completed MARKUS VENEGAS            No new Assessment & Plan notes have been filed under this hospital service since the last note was generated.  Service: Hospital Medicine    Final Active Diagnoses:    Diagnosis Date Noted POA    PRINCIPAL PROBLEM:  Acute osteomyelitis of 1st metatarsal bone of right foot [M86.171] 07/02/2024 Yes    Class 1 obesity due to excess calories with serious comorbidity and body mass index (BMI) of 31.0 to 31.9 in adult [E66.09, Z68.31] 07/07/2024 Not Applicable    Type 2 diabetes mellitus with hyperglycemia, without long-term current use of insulin  [E11.65] 07/20/2022 Yes    Tobacco dependence due to cigarettes [F17.210] 07/20/2022 Yes    Benign essential HTN [I10] 07/20/2022 Yes      Problems Resolved During this Admission:       Discharged Condition: good    Disposition: Home or Self Care    Follow Up: with PCP. Will need referrals to ID, Podiatry when insurance approved.     Patient Instructions:      Ambulatory referral/consult to Wound Clinic   Standing Status: Future   Referral Priority: Routine Referral Type: Consultation   Referral Reason: Specialty Services Required   Requested Specialty: Wound Care   Number of Visits Requested: 1     Diet diabetic     Notify your health care provider if you experience any of the following:  temperature >100.4     Notify your health care provider if you experience any of the following:  persistent nausea and vomiting or diarrhea     Notify your health care provider if you experience any of the following:  severe uncontrolled pain     Notify your health care provider if you experience any of the following:  redness, tenderness, or signs of infection (pain, swelling, redness, odor or green/yellow discharge around incision site)     Notify your health care provider if you experience any of the following:  difficulty breathing or increased cough     Notify your health care provider if you experience any of the following:  severe persistent headache     Notify your health care provider if you experience any of the following:  worsening rash     Notify your health care provider if you experience any of the following:  persistent dizziness, light-headedness, or visual disturbances     Notify your health care provider if you experience any of the following:  increased confusion or weakness     Reason for not Ordering Smoking Cessation Referral     Order Specific Question Answer Comments   Reason for not ordering: Not medically appropriate at this time insurance pending     Reason for not Prescribing Nicotine Replacement      Order Specific Question Answer Comments   Reason for not Prescribing: Not medically appropriate at this time insurance pending     Activity as tolerated       Significant Diagnostic Studies: N/A    Pending Diagnostic Studies:       Procedure Component Value Units Date/Time    Specimen to Pathology, Surgery Other (Podiatry) [[id number]] Collected: 07/03/24 1325    Order Status: Sent Lab Status: In process Updated: 07/08/24 0815    Specimen: Tissue     Specimen to Pathology, Surgery Other (Podiatry) [[id number]] Collected: 07/03/24 1320    Order Status: Sent Lab Status: In process Updated: 07/03/24 1351    Specimen: Tissue            Medications:  Reconciled Home Medications:      Medication List        START taking these medications      blood sugar diagnostic Strp  1 strip by Misc.(Non-Drug; Combo Route) route 2 (two) times a day.     blood-glucose meter kit  Use as instructed     insulin NPH-insulin regular (70/30) 100 unit/mL (70-30) injection  Commonly known as: HumuLIN 70/30 U-100 Insulin  Inject 30 Units into the skin 2 (two) times daily.     lancets 30 gauge Misc  1 lancet  by Misc.(Non-Drug; Combo Route) route 2 (two) times a day.     levoFLOXacin 750 MG tablet  Commonly known as: LEVAQUIN  Take 1 tablet (750 mg total) by mouth once daily.     metroNIDAZOLE 500 MG tablet  Commonly known as: FLAGYL  Take 1 tablet (500 mg total) by mouth every 12 (twelve) hours.  Replaces: metronidazole 1% 1 % Gel            CONTINUE taking these medications      amlodipine-olmesartan 10-20 mg per tablet  Commonly known as: KENZIE  Take 1 tablet by mouth once daily.            STOP taking these medications      DEXCOM G6  Misc  Generic drug: blood-glucose meter,continuous     DEXCOM G6 SENSOR Alicja  Generic drug: blood-glucose sensor     DEXCOM G6 TRANSMITTER Alicja  Generic drug: blood-glucose transmitter     FREESTYLE COTY 14 DAY READER Misc  Generic drug: flash glucose scanning reader     FREESTYLE COTY 2 SENSOR  Kit  Generic drug: flash glucose sensor     gabapentin 100 MG capsule  Commonly known as: NEURONTIN     hydroCHLOROthiazide 12.5 mg capsule  Commonly known as: MICROZIDE     metronidazole 1% 1 % Gel  Commonly known as: METROGEL  Replaced by: metroNIDAZOLE 500 MG tablet              Indwelling Lines/Drains at time of discharge:   Lines/Drains/Airways       None                   Time spent on the discharge of patient: 35 minutes         Mary Carmen Mancuso MD  Department of Hospital Medicine  [facility]

## 2024-07-08 NOTE — NURSING
Pt teaching done on changing dressing to rt foot wound and teaching done on insulin, pt verbalize understanding.

## 2024-07-08 NOTE — PLAN OF CARE
Case Management Final Discharge Note      Discharge Disposition: home    New DME ordered / company name: has cam boot from hospital    Relevant SDOH / Transition of Care Barriers:  no insurance.  patient    Primary Caretaker and contact information: Marysol Olmstead (Significant other)  [phone] (Mobile)     Scheduled followup appointment: see chart.  Aravind was informed that he would have insurance tomorrow that medicaid would be available.    Referrals placed: sent to [facility] wound care clinic.  Nurse joaquín patient.    Transportation: private vehicle      Patient and family educated on discharge services and updated on DC plan. Berenice/Bedside RN notified, patient clear to discharge from Case Management Perspective.    07/08/24 1600   Final Note   Assessment Type Final Discharge Note   What phone number can be called within the next 1-3 days to see how you are doing after discharge?   (see chart)   Hospital Resources/Appts/Education Provided Provided patient/caregiver with written discharge plan information;Appointments scheduled and added to AVS   Post-Acute Status   Discharge Delays None known at this time

## 2024-07-08 NOTE — PLAN OF CARE
Problem: Adult Inpatient Plan of Care  Goal: Absence of Hospital-Acquired Illness or Injury  Outcome: Progressing  Intervention: Identify and Manage Fall Risk  Flowsheets (Taken 7/8/2024 0348)  Safety Promotion/Fall Prevention:   assistive device/personal item within reach   lighting adjusted   medications reviewed   side rails raised x 2   commode/urinal/bedpan at bedside  Goal: Optimal Comfort and Wellbeing  Outcome: Progressing  Intervention: Monitor Pain and Promote Comfort  Flowsheets (Taken 7/8/2024 0348)  Pain Management Interventions: position adjusted     Problem: Fall Injury Risk  Goal: Absence of Fall and Fall-Related Injury  Outcome: Progressing  Intervention: Identify and Manage Contributors  Flowsheets (Taken 7/8/2024 0348)  Medication Review/Management: medications reviewed

## 2024-07-08 NOTE — NURSING
[facility]  Nurses Note -- 4 Eyes      7/8/2024       Skin assessed on: Q Shift      [x] No Pressure Injuries Present    []Prevention Measures Documented    [] Yes LDA  for Pressure Injury Previously documented     [] Yes New Pressure Injury Discovered   [] LDA for New Pressure Injury Added      Attending RN:  Romulo Faust RN     Second RN:  JIM Bro

## 2024-07-08 NOTE — DISCHARGE INSTRUCTIONS
Wound care: Betadine-soaked gauze, dry gauze, Kerlix, and ACE wrap. Change dressing daily. Wear CAM boot.   No weight bearing on right foot.

## 2024-07-08 NOTE — PROGRESS NOTES
[facility]  Podiatry  Progress Note      Subjective:     Interval History:  Patient 1 day s/p R foot I&D.  VSS, Afebrile, WBC WNL.  Patient doing well and is in good spirits he has remained NWB be since surgery.  Daughter also bedside.  He reports some this R foot that is tolerable with pain medications.  Denies fevers, chills nausea, vomiting.  Denies any new pedal complaints.    7/5/24: S/p 2 days right foot debridement Reports pain well controlled.     7/8/24: Patient seen bedside. Discussed with primary team patient requesting to discharge today due to work. Patient currently uninsured declines to remain inpatient to set up insurance.     Scheduled Meds:   acetaminophen  1,000 mg Oral Q8H    amLODIPine  10 mg Oral Daily    cefTRIAXone (Rocephin) IV (PEDS and ADULTS)  2 g Intravenous Q24H    gabapentin  100 mg Oral TID    insulin aspart U-100  8 Units Subcutaneous TIDWM    insulin glargine U-100  24 Units Subcutaneous BID    melatonin  6 mg Oral Nightly    metroNIDAZOLE  500 mg Oral Q12H    nicotine  1 patch Transdermal Daily     Continuous Infusions:  PRN Meds:  Current Facility-Administered Medications:     glucagon (human recombinant), 1 mg, Intramuscular, PRN    glucose, 16 g, Oral, PRN    glucose, 24 g, Oral, PRN    ibuprofen, 600 mg, Oral, Q6H PRN    insulin aspart U-100, 0-5 Units, Subcutaneous, QID (AC + HS) PRN    naloxone, 0.02 mg, Intravenous, PRN    oxyCODONE, 5 mg, Oral, Q4H PRN    polyethylene glycol, 17 g, Oral, BID PRN    Review of patient's allergies indicates:   Allergen Reactions    Penicillins Anaphylaxis        Past Medical History:   Diagnosis Date    Diabetes mellitus     High cholesterol     Hypertension     Osteomyelitis 07/2024    1st metatarsal     Past Surgical History:   Procedure Laterality Date    CHOLECYSTECTOMY      IRRIGATION AND DEBRIDEMENT Right 7/3/2024    Procedure: IRRIGATION AND DEBRIDEMENT;  Surgeon: Ayla Cordova DPM;  Location: [facility] OR;  Service: Podiatry;   Laterality: Right;  Bone biopsy       Family History       Problem Relation (Age of Onset)    Diabetes Mother, Maternal Grandfather, Paternal Grandfather    Hypertension Mother, Father, Maternal Grandfather, Paternal Grandfather    Miscarriages / Stillbirths Paternal Grandmother          Tobacco Use    Smoking status: Every Day     Current packs/day: 1.00     Average packs/day: 1 pack/day for 30.0 years (30.0 ttl pk-yrs)     Types: Cigarettes     Start date: 7/12/1994    Smokeless tobacco: Never   Substance and Sexual Activity    Alcohol use: Yes     Alcohol/week: 3.0 - 4.0 standard drinks of alcohol     Types: 3 - 4 Cans of beer per week     Comment: Daily    Drug use: Yes     Frequency: 5.0 times per week     Types: Marijuana    Sexual activity: Yes     Partners: Female     Review of Systems   Constitutional:  Positive for activity change.   Respiratory:  Negative for shortness of breath.    Cardiovascular:  Negative for chest pain and leg swelling.   Gastrointestinal:  Negative for nausea and vomiting.   Musculoskeletal:  Positive for arthralgias.   Skin:  Positive for wound.   Neurological:  Positive for numbness. Negative for weakness.   Psychiatric/Behavioral: Negative.       Objective:     Vital Signs (Most Recent):  Temp: 99 °F (37.2 °C) (07/08/24 1054)  Pulse: 90 (07/08/24 1054)  Resp: 16 (07/08/24 1054)  BP: (!) 147/81 (07/08/24 1054)  SpO2: 98 % (07/08/24 1054) Vital Signs (24h Range):  Temp:  [98.2 °F (36.8 °C)-99 °F (37.2 °C)] 99 °F (37.2 °C)  Pulse:  [81-90] 90  Resp:  [16-18] 16  SpO2:  [97 %-100 %] 98 %  BP: (112-154)/(67-86) 147/81     Weight: 89.2 kg (196 lb 10.4 oz)  Body mass index is 31.74 kg/m².    Foot Exam    General  Orientation: alert and oriented to person, place, and time       Right Foot/Ankle     Inspection and Palpation  Skin Exam: ulcer;     Neurovascular  Dorsalis pedis: 1+  Posterior tibial: 1+  Saphenous nerve sensation: diminished  Tibial nerve sensation: diminished  Superficial  peroneal nerve sensation: diminished  Deep peroneal nerve sensation: diminished  Sural nerve sensation: diminished      Left Foot/Ankle      Inspection and Palpation  Skin Exam: no ulcer     Neurovascular  Dorsalis pedis: 1+  Posterior tibial: 1+  Saphenous nerve sensation: diminished  Tibial nerve sensation: diminished  Superficial peroneal nerve sensation: diminished  Deep peroneal nerve sensation: diminished  Sural nerve sensation: diminished        7/8/24:              7/3/24:  Pre debridement  wound with purulent drainage, probe to bone.     Post debridement       7/4:        S/p OR I&D.  Wound base with granular tissue.  Scant sanguinous drainage with manual expression.  No tenderness to palpation.  No malodor noted.  Appropriate post-op swelling and erythema noted.    7/5/24:                  Laboratory:  CBC:   Recent Labs   Lab 07/08/24 0428   WBC 7.21   RBC 3.87*   HGB 10.1*   HCT 33.2*      MCV 86   MCH 26.1*   MCHC 30.4*     CMP:   Recent Labs   Lab 07/08/24 0428   *   CALCIUM 9.5   ALBUMIN 1.9*   PROT 8.0      K 4.1   CO2 25      BUN 13   CREATININE 0.8   ALKPHOS 163*   ALT 23   AST 31   BILITOT 0.1       Diagnostic Results:  Xray: X-Ray Foot Complete Right  Order: [id number]  Status: Final result       Visible to patient: Yes (not seen)       Next appt: None       Dx: Other acute osteomyelitis of right foot    0 Result Notes  Details    Reading Physician Reading Date Result Priority   Cristian Newberry MD  [phone]  [phone] 7/3/2024 Routine     Narrative & Impression  EXAMINATION:  XR FOOT COMPLETE 3 VIEW RIGHT     CLINICAL HISTORY:  wound right foot;. Other acute osteomyelitis, right ankle and foot     TECHNIQUE:  3 views of the right foot     COMPARISON:  Right foot radiographs 07/22/2019     FINDINGS:  Postoperative changes of transmetatarsal amputation of the 1st digit with acute osseous erosive changes suspected throughout the residual 1st metatarsal none  surrounding subcutaneous emphysema.     Impression:     1. Postoperative changes of transmetatarsal amputation of the 1st digit with acute osseous erosive changes suspected throughout the residual 1st metatarsal none surrounding subcutaneous emphysema concerning for acute osteomyelitis.  This report was flagged in Epic as abnormal.     Clinical Findings:  Right foot ulceration with purulent drainage.     Assessment/Plan:     Active Diagnoses:    Diagnosis Date Noted POA    PRINCIPAL PROBLEM:  Acute osteomyelitis of 1st metatarsal bone of right foot [M86.171] 07/02/2024 Yes    Class 1 obesity due to excess calories with serious comorbidity and body mass index (BMI) of 31.0 to 31.9 in adult [E66.09, Z68.31] 07/07/2024 Not Applicable    Type 2 diabetes mellitus with hyperglycemia, without long-term current use of insulin [E11.65] 07/20/2022 Yes    Tobacco dependence due to cigarettes [F17.210] 07/20/2022 Yes    Benign essential HTN [I10] 07/20/2022 Yes      Problems Resolved During this Admission:     Patient  s/p R foot wound incision, drainage, and debridement DOS: 7/3/24 Wound with granular tissue no purulent drainage or malodor noted.    Patient to remain NWB in long cam boot.    I  Antibiotics per primary team.    R foot dressed with Betadine-soaked gauze, dry gauze, Kerlix, and ACE wrap.      Discussed with SW referral to [facility] wound care-     All other care per primary.    Thank you for your consult. I will follow-up with patient. Please contact us if you have any additional questions.    Ayla Cordova DPM  Podiatry  [facility] - Med Surg

## 2024-07-10 LAB
FINAL PATHOLOGIC DIAGNOSIS: NORMAL
GROSS: NORMAL
Lab: NORMAL

## 2024-07-15 LAB — FUNGUS SPEC CULT: NORMAL

## 2024-08-07 ENCOUNTER — PATIENT MESSAGE (OUTPATIENT)
Dept: INFECTIOUS DISEASES | Facility: CLINIC | Age: 49
End: 2024-08-07
Payer: MEDICAID

## 2024-12-01 ENCOUNTER — HOSPITAL ENCOUNTER (EMERGENCY)
Facility: HOSPITAL | Age: 49
Discharge: HOME | End: 2024-12-02
Attending: STUDENT IN AN ORGANIZED HEALTH CARE EDUCATION/TRAINING PROGRAM
Payer: MEDICAID

## 2024-12-01 DIAGNOSIS — M86.9 OSTEOMYELITIS: ICD-10-CM

## 2024-12-01 DIAGNOSIS — M79.89 RIGHT LEG SWELLING: ICD-10-CM

## 2024-12-01 DIAGNOSIS — M54.41 ACUTE RIGHT-SIDED LOW BACK PAIN WITH RIGHT-SIDED SCIATICA: Primary | ICD-10-CM

## 2024-12-01 DIAGNOSIS — R00.0 TACHYCARDIA: ICD-10-CM

## 2024-12-01 LAB
ALBUMIN SERPL BCP-MCNC: 3.2 G/DL (ref 3.5–5.2)
ALLENS TEST: ABNORMAL
ALLENS TEST: NORMAL
ALP SERPL-CCNC: 103 U/L (ref 40–150)
ALT SERPL W/O P-5'-P-CCNC: 19 U/L (ref 10–44)
ANION GAP SERPL CALC-SCNC: 11 MMOL/L (ref 8–16)
AST SERPL-CCNC: 17 U/L (ref 10–40)
BASOPHILS # BLD AUTO: 0.04 K/UL (ref 0–0.2)
BASOPHILS NFR BLD: 0.6 % (ref 0–1.9)
BILIRUB SERPL-MCNC: 0.3 MG/DL (ref 0.1–1)
BUN SERPL-MCNC: 13 MG/DL (ref 6–20)
CALCIUM SERPL-MCNC: 10 MG/DL (ref 8.7–10.5)
CHLORIDE SERPL-SCNC: 101 MMOL/L (ref 95–110)
CO2 SERPL-SCNC: 24 MMOL/L (ref 23–29)
CREAT SERPL-MCNC: 1.2 MG/DL (ref 0.5–1.4)
CRP SERPL-MCNC: 3.1 MG/L (ref 0–8.2)
DELSYS: ABNORMAL
DELSYS: NORMAL
DIFFERENTIAL METHOD BLD: ABNORMAL
EOSINOPHIL # BLD AUTO: 0.2 K/UL (ref 0–0.5)
EOSINOPHIL NFR BLD: 2.4 % (ref 0–8)
ERYTHROCYTE [DISTWIDTH] IN BLOOD BY AUTOMATED COUNT: 13.7 % (ref 11.5–14.5)
EST. GFR  (NO RACE VARIABLE): >60 ML/MIN/1.73 M^2
GLUCOSE SERPL-MCNC: 331 MG/DL (ref 70–110)
HCO3 UR-SCNC: 30.5 MMOL/L (ref 24–28)
HCT VFR BLD AUTO: 45.4 % (ref 40–54)
HGB BLD-MCNC: 15.6 G/DL (ref 14–18)
IMM GRANULOCYTES # BLD AUTO: 0.05 K/UL (ref 0–0.04)
IMM GRANULOCYTES NFR BLD AUTO: 0.8 % (ref 0–0.5)
LDH SERPL L TO P-CCNC: 1.63 MMOL/L (ref 0.5–2.2)
LYMPHOCYTES # BLD AUTO: 1.7 K/UL (ref 1–4.8)
LYMPHOCYTES NFR BLD: 26.2 % (ref 18–48)
MAGNESIUM SERPL-MCNC: 1.6 MG/DL (ref 1.6–2.6)
MCH RBC QN AUTO: 29.3 PG (ref 27–31)
MCHC RBC AUTO-ENTMCNC: 34.4 G/DL (ref 32–36)
MCV RBC AUTO: 85 FL (ref 82–98)
MODE: ABNORMAL
MODE: NORMAL
MONOCYTES # BLD AUTO: 0.4 K/UL (ref 0.3–1)
MONOCYTES NFR BLD: 6.1 % (ref 4–15)
NEUTROPHILS # BLD AUTO: 4.1 K/UL (ref 1.8–7.7)
NEUTROPHILS NFR BLD: 63.9 % (ref 38–73)
NRBC BLD-RTO: 0 /100 WBC
PCO2 BLDA: 53.2 MMHG (ref 35–45)
PH SMN: 7.37 [PH] (ref 7.35–7.45)
PHOSPHATE SERPL-MCNC: 2.9 MG/DL (ref 2.7–4.5)
PLATELET # BLD AUTO: 236 K/UL (ref 150–450)
PMV BLD AUTO: 11 FL (ref 9.2–12.9)
PO2 BLDA: 27 MMHG (ref 40–60)
POC BE: 4 MMOL/L
POC SATURATED O2: 46 % (ref 95–100)
POC TCO2: 32 MMOL/L (ref 24–29)
POCT GLUCOSE: 342 MG/DL (ref 70–110)
POTASSIUM SERPL-SCNC: 3.9 MMOL/L (ref 3.5–5.1)
PROT SERPL-MCNC: 8.7 G/DL (ref 6–8.4)
RBC # BLD AUTO: 5.33 M/UL (ref 4.6–6.2)
SAMPLE: ABNORMAL
SAMPLE: NORMAL
SITE: ABNORMAL
SITE: NORMAL
SODIUM SERPL-SCNC: 136 MMOL/L (ref 136–145)
WBC # BLD AUTO: 6.37 K/UL (ref 3.9–12.7)

## 2024-12-01 PROCEDURE — 99900035 HC TECH TIME PER 15 MIN (STAT)

## 2024-12-01 PROCEDURE — 83735 ASSAY OF MAGNESIUM: CPT | Performed by: STUDENT IN AN ORGANIZED HEALTH CARE EDUCATION/TRAINING PROGRAM

## 2024-12-01 PROCEDURE — 82803 BLOOD GASES ANY COMBINATION: CPT

## 2024-12-01 PROCEDURE — 85025 COMPLETE CBC W/AUTO DIFF WBC: CPT | Performed by: STUDENT IN AN ORGANIZED HEALTH CARE EDUCATION/TRAINING PROGRAM

## 2024-12-01 PROCEDURE — 87040 BLOOD CULTURE FOR BACTERIA: CPT | Mod: 59 | Performed by: STUDENT IN AN ORGANIZED HEALTH CARE EDUCATION/TRAINING PROGRAM

## 2024-12-01 PROCEDURE — 96374 THER/PROPH/DIAG INJ IV PUSH: CPT

## 2024-12-01 PROCEDURE — 86140 C-REACTIVE PROTEIN: CPT | Performed by: STUDENT IN AN ORGANIZED HEALTH CARE EDUCATION/TRAINING PROGRAM

## 2024-12-01 PROCEDURE — 96376 TX/PRO/DX INJ SAME DRUG ADON: CPT

## 2024-12-01 PROCEDURE — 83605 ASSAY OF LACTIC ACID: CPT

## 2024-12-01 PROCEDURE — 99285 EMERGENCY DEPT VISIT HI MDM: CPT | Mod: 25

## 2024-12-01 PROCEDURE — 82962 GLUCOSE BLOOD TEST: CPT

## 2024-12-01 PROCEDURE — 96361 HYDRATE IV INFUSION ADD-ON: CPT

## 2024-12-01 PROCEDURE — 96375 TX/PRO/DX INJ NEW DRUG ADDON: CPT

## 2024-12-01 PROCEDURE — 93010 ELECTROCARDIOGRAM REPORT: CPT | Mod: ,,, | Performed by: INTERNAL MEDICINE

## 2024-12-01 PROCEDURE — 84100 ASSAY OF PHOSPHORUS: CPT | Performed by: STUDENT IN AN ORGANIZED HEALTH CARE EDUCATION/TRAINING PROGRAM

## 2024-12-01 PROCEDURE — 80053 COMPREHEN METABOLIC PANEL: CPT | Performed by: STUDENT IN AN ORGANIZED HEALTH CARE EDUCATION/TRAINING PROGRAM

## 2024-12-01 PROCEDURE — 93005 ELECTROCARDIOGRAM TRACING: CPT

## 2024-12-01 PROCEDURE — 63600175 PHARM REV CODE 636 W HCPCS: Performed by: STUDENT IN AN ORGANIZED HEALTH CARE EDUCATION/TRAINING PROGRAM

## 2024-12-01 RX ORDER — LIDOCAINE 50 MG/G
1 PATCH TOPICAL DAILY
Qty: 21 PATCH | Refills: 0 | Status: SHIPPED | OUTPATIENT
Start: 2024-12-01

## 2024-12-01 RX ORDER — KETOROLAC TROMETHAMINE 30 MG/ML
15 INJECTION, SOLUTION INTRAMUSCULAR; INTRAVENOUS
Status: COMPLETED | OUTPATIENT
Start: 2024-12-01 | End: 2024-12-01

## 2024-12-01 RX ORDER — MORPHINE SULFATE 4 MG/ML
4 INJECTION, SOLUTION INTRAMUSCULAR; INTRAVENOUS
Status: COMPLETED | OUTPATIENT
Start: 2024-12-01 | End: 2024-12-01

## 2024-12-01 RX ORDER — TIZANIDINE 2 MG/1
4 TABLET ORAL EVERY 6 HOURS PRN
Qty: 21 TABLET | Refills: 0 | Status: SHIPPED | OUTPATIENT
Start: 2024-12-01 | End: 2024-12-11

## 2024-12-01 RX ORDER — DICLOFENAC SODIUM 10 MG/G
2 GEL TOPICAL 4 TIMES DAILY
Qty: 200 G | Refills: 0 | Status: SHIPPED | OUTPATIENT
Start: 2024-12-01

## 2024-12-01 RX ADMIN — SODIUM CHLORIDE, POTASSIUM CHLORIDE, SODIUM LACTATE AND CALCIUM CHLORIDE 1000 ML: 600; 310; 30; 20 INJECTION, SOLUTION INTRAVENOUS at 09:12

## 2024-12-01 RX ADMIN — MORPHINE SULFATE 4 MG: 4 INJECTION INTRAVENOUS at 08:12

## 2024-12-01 RX ADMIN — MORPHINE SULFATE 4 MG: 4 INJECTION INTRAVENOUS at 09:12

## 2024-12-01 RX ADMIN — KETOROLAC TROMETHAMINE 15 MG: 30 INJECTION, SOLUTION INTRAMUSCULAR; INTRAVENOUS at 08:12

## 2024-12-02 VITALS
RESPIRATION RATE: 14 BRPM | SYSTOLIC BLOOD PRESSURE: 123 MMHG | HEIGHT: 66 IN | OXYGEN SATURATION: 95 % | TEMPERATURE: 98 F | HEART RATE: 109 BPM | WEIGHT: 196 LBS | DIASTOLIC BLOOD PRESSURE: 60 MMHG | BODY MASS INDEX: 31.5 KG/M2

## 2024-12-02 NOTE — ED NOTES
Pt arrive to ED via EMS c/o back pain that radiated down leg x3 days. Denies trauma. EMS reported that family said he has been sleeping in his work shop in pain for a few days but refused to come in. Denies cp, sob, n/v/d, AAOx4. Pt has a wound on each foot.

## 2024-12-02 NOTE — DISCHARGE INSTRUCTIONS

## 2024-12-02 NOTE — HPI
"Hany Guerin is a 49 y.o. male with [id number]::"***"} who presented to [facility] ED on 12/01/2024 for eval and treatment of R lower back pain.  They describe their pain as shooting, {0-10:42193}/10, located *** with radiation down the back of his R leg.  It started 3 days ago and has been steadily worsening and constant since.  There is associated ***.  They deny associated ***.  Symptoms are alleviated by ***.  Symptoms are worsened by movement.  He also complains of wounds on both feet.  He was last treated at [facility] in July '24 for acute L osteo    ED course notable for the following: ***.  VS ***.  Exam ***.  Labs ***.  EKG ***.  Imaging: ***.  ED therapy/stabilization measures: ***.  They were {WBAdmitOptions:74417}        "

## 2024-12-02 NOTE — ED PROVIDER NOTES
Encounter Date: 12/1/2024       History     Chief Complaint   Patient presents with    Back Pain     Pt BIB EMS, c/o non traumatic, right side lower back pain. Pt reports a shooting pain from his lower back that radiates down his back right leg x 3 days. Pt denies any CP, SOB, abd pain or n/v/d     HPI    48 yo M w/IDDM (noncompliant with insulin), HTN, hx of R hallux amputation 2/2 osteomyelitis (2022), HLD, presenting with R sided low back pain radiating down RLE x 3 days.    No recent hx of trauma. Denies fever, chills, nausea, vomiting. Reports he has not taken any of his medications including insulin in several months.    Denies worsening pain to R foot, however does have worsening sensation in R foot.     Most recent admission 7/2024 for acute osteo of 1st metatarsal bone of R foot, tx with ceftriaxone, flagyl, vanc, A1c at that time of >14.    Denies urinary incontinence, retention, bowel incontinence, denies worsening weakness of RLE, does have increased numbness in bl LE.      Review of patient's allergies indicates:   Allergen Reactions    Penicillins Anaphylaxis     Past Medical History:   Diagnosis Date    Diabetes mellitus     High cholesterol     Hypertension     Osteomyelitis 07/2024    1st metatarsal     Past Surgical History:   Procedure Laterality Date    CHOLECYSTECTOMY      IRRIGATION AND DEBRIDEMENT Right 7/3/2024    Procedure: IRRIGATION AND DEBRIDEMENT;  Surgeon: Ayla Cordova DPM;  Location: [facility];  Service: Podiatry;  Laterality: Right;  Bone biopsy     Family History   Problem Relation Name Age of Onset    Hypertension Mother      Diabetes Mother      Hypertension Father      Hypertension Maternal Grandfather      Diabetes Maternal Grandfather      Miscarriages / Stillbirths Paternal Grandmother      Diabetes Paternal Grandfather      Hypertension Paternal Grandfather       Social History     Tobacco Use    Smoking status: Every Day     Current packs/day: 1.00     Average packs/day: 1  pack/day for 30.4 years (30.4 ttl pk-yrs)     Types: Cigarettes     Start date: 7/12/1994    Smokeless tobacco: Never   Substance Use Topics    Alcohol use: Yes     Alcohol/week: 3.0 - 4.0 standard drinks of alcohol     Types: 3 - 4 Cans of beer per week     Comment: Daily    Drug use: Yes     Frequency: 5.0 times per week     Types: Marijuana     Review of Systems    Physical Exam     Initial Vitals [12/01/24 1905]   BP Pulse Resp Temp SpO2   (!) 205/104 106 18 98.3 °F (36.8 °C) 100 %      MAP       --         Physical Exam    Constitutional: He appears well-developed and well-nourished.   HENT:   Head: Normocephalic and atraumatic.   Eyes: EOM are normal.   Cardiovascular:  Normal rate and regular rhythm.           Pulmonary/Chest: Effort normal.   Abdominal: He exhibits no distension.   Musculoskeletal:         General: No tenderness or edema.      Comments: Bony point tenderness to R SI region, no midline lumbar tenderness     Neurological: He is alert and oriented to person, place, and time.   +RLE straight leg raise    Sensation to light touch decreased in bl feet, equal   Skin: Skin is warm and dry.   Circular lesion to 1st MTP proximal of 1st hallux amputation site, no drainage, +erythema   Psychiatric: He has a normal mood and affect.         ED Course   Procedures  Labs Reviewed   CBC W/ AUTO DIFFERENTIAL - Abnormal       Result Value    WBC 6.37      RBC 5.33      Hemoglobin 15.6      Hematocrit 45.4      MCV 85      MCH 29.3      MCHC 34.4      RDW 13.7      Platelets 236      MPV 11.0      Immature Granulocytes 0.8 (*)     Gran # (ANC) 4.1      Immature Grans (Abs) 0.05 (*)     Lymph # 1.7      Mono # 0.4      Eos # 0.2      Baso # 0.04      nRBC 0      Gran % 63.9      Lymph % 26.2      Mono % 6.1      Eosinophil % 2.4      Basophil % 0.6      Differential Method Automated     COMPREHENSIVE METABOLIC PANEL - Abnormal    Sodium 136      Potassium 3.9      Chloride 101      CO2 24      Glucose 331 (*)      BUN 13      Creatinine 1.2      Calcium 10.0      Total Protein 8.7 (*)     Albumin 3.2 (*)     Total Bilirubin 0.3      Alkaline Phosphatase 103      AST 17      ALT 19      eGFR >60      Anion Gap 11     POCT GLUCOSE - Abnormal    POCT Glucose 342 (*)    ISTAT PROCEDURE - Abnormal    POC PH 7.366      POC PCO2 53.2 (*)     POC PO2 27 (*)     POC HCO3 30.5 (*)     POC BE 4 (*)     POC SATURATED O2 46      POC TCO2 32 (*)     Sample VENOUS      Site Other      Allens Test N/A      DelSys Room Air      Mode SPONT     CULTURE, BLOOD    Blood Culture, Routine No Growth after 4 days.     CULTURE, BLOOD    Blood Culture, Routine No Growth after 4 days.     C-REACTIVE PROTEIN    CRP 3.1     MAGNESIUM    Magnesium 1.6     PHOSPHORUS    Phosphorus 2.9     ISTAT LACTATE    POC Lactate 1.63      Sample VENOUS      Site Other      Allens Test N/A      DelSys Room Air      Mode SPONT          ECG Results              EKG 12-lead (Final result)        Collection Time Result Time QRS Duration OHS QTC Calculation    12/01/24 22:44:44 12/04/24 17:39:08 86 463                     Final result by Interface, Lab In [facility] (12/04/24 17:39:13)                   Narrative:    Test Reason : R00.0,    Vent. Rate : 109 BPM     Atrial Rate : 109 BPM     P-R Int : 158 ms          QRS Dur :  86 ms      QT Int : 344 ms       P-R-T Axes :  52  62  17 degrees    QTcB Int : 463 ms    Sinus tachycardia  Nonspecific ST abnormality  Abnormal ECG  When compared with ECG of 02-Jul-2024 08:57,  No significant change was found  Confirmed by Rogelio Tobias ([id number]) on 12/4/2024 5:39:05 PM    Referred By: AAAREFERRAL SELF           Confirmed By: Rogelio Tobias                                  Imaging Results              US Lower Extremity Veins Right (Final result)  Result time 12/01/24 20:56:50      Final result by Tod Chang MD (12/01/24 20:56:50)                   Impression:      No evidence of right lower extremity deep venous  thrombosis.      Electronically signed by: Tod Chang MD  Date:    12/01/2024  Time:    20:56               Narrative:    EXAMINATION:  US LOWER EXTREMITY VEINS RIGHT    CLINICAL HISTORY:  Other specified soft tissue disorders    TECHNIQUE:  Duplex and color flow Doppler evaluation of the right lower extremity veins was performed.    COMPARISON:  None    FINDINGS:  No evidence of clot involving the bilateral common femoral veins or right greater saphenous, femoral, popliteal, peroneal, anterior and posterior tibial veins.  All venous structures demonstrate normal respiratory phasicity and augment adequately.  No evidence of soft tissue mass or Baker's cyst.                                       X-Ray Foot Complete Right (Final result)  Result time 12/01/24 20:19:39      Final result by Tod Chang MD (12/01/24 20:19:39)                   Impression:      See above.      Electronically signed by: Tdo Chang MD  Date:    12/01/2024  Time:    20:19               Narrative:    EXAMINATION:  XR FOOT COMPLETE 3 VIEW RIGHT    CLINICAL HISTORY:  . Osteomyelitis, unspecified    TECHNIQUE:  AP, lateral, and oblique views of the right foot were performed.    COMPARISON:  07/03/2024.    FINDINGS:  Postsurgical changes of great toe and 1st transmetatarsal amputation are seen.  There is sclerosis and abnormal cortical irregularity seen involving the distal aspect of the 1st metatarsal consistent with chronic osteomyelitis.  Potential acute on chronic osteomyelitis is difficult to exclude.  Future nonemergent MRI follow-up could be obtained for increased sensitivity.  There is soft tissue wound and small amount of soft tissue air seen in the region.    No additional acute osseous destructive process seen.  No evidence of acute displaced fracture or dislocation.  Remote healed fracture deformities are seen of the 2nd and 3rd metatarsal shafts.  Degenerative changes and chronic change are seen involving the 2nd toe.                                        CT Lumbar Spine Without Contrast (Final result)  Result time 12/01/24 20:15:06      Final result by Tod Chang MD (12/01/24 20:15:06)                   Impression:      No acute lumbar spine abnormalities identified.  Multilevel lumbar DJD as discussed above.      Electronically signed by: Tod Chang MD  Date:    12/01/2024  Time:    20:15               Narrative:    EXAMINATION:  CT LUMBAR SPINE WITHOUT CONTRAST    CLINICAL HISTORY:  Low back pain, progressive neurologic deficit;    TECHNIQUE:  Low-dose axial, sagittal and coronal reformations are obtained through the lumbar spine.  Contrast was not administered.    COMPARISON:  None.    FINDINGS:  Lumbar spine alignment is within normal limits.  No evidence of acute lumbar spine fracture or subluxation.  Intervertebral disc space narrowing with diffuse disc bulges are seen at L3-4 and L4-5 levels resulting in moderate spinal canal stenosis.  No significant spinal canal stenosis seen throughout the remaining lumbar levels.  There is multilevel mild facet arthropathy with severe facet arthropathy seen on the left at L5-S1.    Surrounding soft tissues show no significant abnormalities.  Partially visualized intra-abdominal structures show no acute abnormalities.                                       CT Pelvis Without Contrast (Final result)  Result time 12/01/24 20:10:45      Final result by Tod Chang MD (12/01/24 20:10:45)                   Impression:      No acute displaced fracture seen.      Electronically signed by: Tod Chang MD  Date:    12/01/2024  Time:    20:10               Narrative:    EXAMINATION:  CT PELVIS WITHOUT CONTRAST    CLINICAL HISTORY:  Pelvis pain, stress fracture suspected, no prior imaging;    TECHNIQUE:  CT of the pelvis was obtained without the use of IV contrast.  Sagittal and coronal reformats were obtained.    COMPARISON:  No current radiographs for  comparison.    FINDINGS:  No acute pelvic or proximal femur fracture seen.  Surrounding soft tissues show no significant abnormalities.    Partially visualized lower abdominal and intrapelvic structures show no acute abnormalities.  There is sigmoid diverticulosis.  Small fat containing bilateral inguinal hernias are noted.                                       Medications   morphine injection 4 mg (4 mg Intravenous Given 12/1/24 2031)   ketorolac injection 15 mg (15 mg Intravenous Given 12/1/24 2031)   lactated ringers bolus 1,000 mL (0 mLs Intravenous Stopped 12/1/24 2314)   morphine injection 4 mg (4 mg Intravenous Given 12/1/24 2157)     Medical Decision Making  50 yo M w/IDDM (noncompliant with insulin), HTN, hx of R hallux amputation 2/2 osteomyelitis (2022), HLD, presenting with R sided low back pain radiating down RLE x 3 days.      Differential diagnosis includes but is not limited to: sciatica, SI joint pathology, MSK pain, osteomyelitis, avascular necrosis, DVT    US negative for DVT in RLE, CT pelvis/L spine with no acute displaced fracture or other acute pathology. XR of R foot with no signs of osteomyelitis, normal CRP, less concern for osteomyelitis as cause of lesion to R foot. Given lab, imaging findings, patient's presentation is consistent with sciatica, pain controlled after toradol, IV morphine. No focal neuro deficits, urinary/bowel symptoms to suggest cord compression. Discharged with muscle relaxers, voltaren, lidocaine patches.    I discussed with the patient/family the diagnosis, treatment plan, indications for return to the emergency department, as well as for expected follow-up. The patient/family verbalized an understanding. The patient/family is asked if there were any questions or concerns, which were addressed to patient/family satisfaction. Patient/family understands and is agreeable to the plan.     DISCLAIMER: This note was prepared with [employer] voice recognition transcription  software. Garbled syntax, mangled pronouns, and other bizarre constructions may be attributed to that software system.      Amount and/or Complexity of Data Reviewed  Labs: ordered. Decision-making details documented in ED Course.  Radiology: ordered.    Risk  Prescription drug management.               ED Course as of 24 0016   Sun Dec 01, 2024   1932 POCT Glucose(!): 342 [LF]    POC Lactate: 1.63 [LF]    Attempted to reach daughter Shannan at [phone], no response  [LF]    CRP: 3.1 [LF]   230 Patient reassessed, continues with sinus tachycardia in the 106-110s, reports frequent EtOH use, however not daily, with no tongue fasciculations, no tremors, last drink was roughly 6 hours ago.  Reports pain has resolved.  Denies chest pain, denies dyspnea, denies pleuritic chest pain, unclear etiology for sinus tachycardia with no other symptoms [LF]   230 Does have chart review history of tachycardia with benign encounters to the ER ranging from 100-120. [LF]      ED Course User Index  [LF] Ksenia Rapp MD                           Clinical Impression:  Final diagnoses:  [M86.9] Osteomyelitis  [M79.89] Right leg swelling  [R00.0] Tachycardia  [M54.41] Acute right-sided low back pain with right-sided sciatica (Primary)          ED Disposition Condition    Discharge Stable          ED Prescriptions       Medication Sig Dispense Start Date End Date Auth. Provider    diclofenac sodium (VOLTAREN ARTHRITIS PAIN) 1 % Gel Apply 2 g topically 4 (four) times daily. 200 g 2024 -- Ksenia Rapp MD    LIDOcaine (LIDODERM) 5 % Place 1 patch onto the skin once daily. Remove & Discard patch within 12 hours or as directed by MD 21 patch 2024 -- Ksenia Rapp MD    tiZANidine (ZANAFLEX) 2 MG tablet () Take 2 tablets (4 mg total) by mouth every 6 (six) hours as needed. 21 tablet 2024 Ksenia Rapp MD          Follow-up Information       Follow up With Specialties Details Why  Contact Info    Fadumo, Lyle FIERRO MD Family Medicine, Wound Care  sciatica [address] [facility] [place] [address]  [phone]               Ksenia Rapp MD  12/22/24 0017

## 2024-12-04 LAB
OHS QRS DURATION: 86 MS
OHS QTC CALCULATION: 463 MS

## 2024-12-05 LAB
BACTERIA BLD CULT: NORMAL
BACTERIA BLD CULT: NORMAL

## 2025-01-26 PROCEDURE — 99284 EMERGENCY DEPT VISIT MOD MDM: CPT

## 2025-01-26 PROCEDURE — 82962 GLUCOSE BLOOD TEST: CPT

## 2025-01-27 ENCOUNTER — HOSPITAL ENCOUNTER (EMERGENCY)
Facility: HOSPITAL | Age: 50
Discharge: HOME | End: 2025-01-27
Attending: EMERGENCY MEDICINE
Payer: MEDICAID

## 2025-01-27 VITALS
SYSTOLIC BLOOD PRESSURE: 154 MMHG | BODY MASS INDEX: 34.55 KG/M2 | TEMPERATURE: 99 F | WEIGHT: 215 LBS | HEART RATE: 98 BPM | RESPIRATION RATE: 18 BRPM | DIASTOLIC BLOOD PRESSURE: 82 MMHG | OXYGEN SATURATION: 99 % | HEIGHT: 66 IN

## 2025-01-27 DIAGNOSIS — T82.9XXA CENTRAL LINE COMPLICATION, INITIAL ENCOUNTER: Primary | ICD-10-CM

## 2025-01-27 DIAGNOSIS — R73.9 HYPERGLYCEMIA: ICD-10-CM

## 2025-01-27 LAB — POCT GLUCOSE: 271 MG/DL (ref 70–110)

## 2025-01-27 PROCEDURE — 36569 INSJ PICC 5 YR+ W/O IMAGING: CPT

## 2025-01-27 PROCEDURE — C1751 CATH, INF, PER/CENT/MIDLINE: HCPCS

## 2025-01-27 NOTE — PROCEDURES
"Hany Guerin is a 49 y.o. male patient.    Temp: 98.6 °F (37 °C) (01/27/25 0502)  Pulse: 98 (01/27/25 0502)  Resp: 18 (01/27/25 0502)  BP: (!) 154/82 (01/27/25 0502)  SpO2: 99 % (01/27/25 0502)  Weight: 97.5 kg (215 lb) (01/27/25 0006)  Height: 5' 6" (167.6 cm) (01/27/25 0006)    PICC  Date/Time: 1/27/2025 5:57 AM  Performed by: Christopher Barrios RN  Consent Done: Yes  Time out: Immediately prior to procedure a time out was called to verify the correct patient, procedure, equipment, support staff and site/side marked as required  Indications: med administration and vascular access  Anesthesia: local infiltration  Local anesthetic: lidocaine 1% without epinephrine  Anesthetic Total (mL): 5  Preparation: skin prepped with ChloraPrep  Skin prep agent dried: skin prep agent completely dried prior to procedure  Sterile barriers: all five maximum sterile barriers used - cap, mask, sterile gown, sterile gloves, and large sterile sheet  Hand hygiene: hand hygiene performed prior to central venous catheter insertion  Location details: left basilic  Catheter type: double lumen  Catheter size: 5 Fr  Catheter Length: 45cm    Ultrasound guidance: yes  Vessel Caliber: medium, compressibility normal  Needle advanced into vessel with real time Ultrasound guidance.  Guidewire confirmed in vessel.  Sterile sheath used.  Number of attempts: 1  Post-procedure: blood return through all ports, chlorhexidine patch and sterile dressing applied            Name christopher barrios  1/27/2025    "

## 2025-01-27 NOTE — ED PROVIDER NOTES
Encounter Date: 1/26/2025       History     Chief Complaint   Patient presents with    Dressing Change     Pt reported to the ED with a CC of needing a dressing change to his picc line. The PT reported that he was discharged from the [facility] with a PICC line and 4 weeks of antibiotics. The PT reported that he was attempted to change the dressing after it had become saturated. The Pt reported that he did not have dressings at home.     49-year-old male smoker presents to ED with chief complaint need for right upper extremity PICC line dressing change.    Hospitalized from 1/17-1/25 due to left foot wound/osteomyelitis/septic arthritis, s/p I&D L foot 5th metatarsal excision w/resection on 1/18.  PICC line placed during hospitalization, discharged on IV vancomycin and Rocephin, oral Flagyl.  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  He states the dressing became sweaty, tried to change it himself.  He does not have any experience with the PICC line, therefore he presents to ED to have the dressing changed.  Denies any bleeding from the site.  No increased pain to right upper extremity PICC insertion site.  He has been doing well since discharge yesterday, states his left foot pain is much improved, he is ambulatory with walking boot.  Denies fever or chills.  No other complaints.  Symptoms are acute, constant, mild.  No alleviating or exacerbating factors.  No radiation of symptoms.    Right-hand dominant    PMH:  IDDM  HTN  HLD  Obesity      Review of patient's allergies indicates:   Allergen Reactions    Penicillins Anaphylaxis     Past Medical History:   Diagnosis Date    Diabetes mellitus     High cholesterol     Hypertension     Osteomyelitis 07/2024    1st metatarsal     Past Surgical History:   Procedure Laterality Date    CHOLECYSTECTOMY      IRRIGATION AND DEBRIDEMENT Right 7/3/2024    Procedure: IRRIGATION AND DEBRIDEMENT;  Surgeon: Ayla Cordova DPM;  Location: [facility] OR;   Service: Podiatry;  Laterality: Right;  Bone biopsy     Family History   Problem Relation Name Age of Onset    Hypertension Mother      Diabetes Mother      Hypertension Father      Hypertension Maternal Grandfather      Diabetes Maternal Grandfather      Miscarriages / Stillbirths Paternal Grandmother      Diabetes Paternal Grandfather      Hypertension Paternal Grandfather       Social History     Tobacco Use    Smoking status: Every Day     Current packs/day: 1.00     Average packs/day: 1 pack/day for 30.5 years (30.5 ttl pk-yrs)     Types: Cigarettes     Start date: 7/12/1994    Smokeless tobacco: Never   Substance Use Topics    Alcohol use: Yes     Alcohol/week: 3.0 - 4.0 standard drinks of alcohol     Types: 3 - 4 Cans of beer per week     Comment: Daily    Drug use: Yes     Frequency: 5.0 times per week     Types: Marijuana     Review of Systems   Constitutional:  Negative for chills and fever.   Musculoskeletal:  Negative for arthralgias and gait problem.   Skin:  Negative for wound.   Neurological:  Negative for syncope.       Physical Exam     Initial Vitals [01/27/25 0006]   BP Pulse Resp Temp SpO2   (!) 158/89 106 18 98.6 °F (37 °C) 100 %      MAP       --         Physical Exam    Nursing note and vitals reviewed.  Constitutional: He appears well-developed and well-nourished. He is not diaphoretic. No distress.   HENT:   Head: Normocephalic and atraumatic.   Neck: Neck supple.   Normal range of motion.  Cardiovascular:  Intact distal pulses.           Right upper extremity PICC line insertion proximal to the antecubital fossa---line is nearly completely removed from insertion site, no dressing in place to the area.  No surrounding erythema or warmth, no tenderness.    After removal: no hematoma, no uncontrolled bleeding    2+ radial bilaterally   Musculoskeletal:      Cervical back: Normal range of motion and neck supple.     Neurological: He is alert and oriented to person, place, and time.   Skin:  Skin is warm.   Psychiatric: He has a normal mood and affect. Thought content normal.         ED Course   Procedures  Labs Reviewed   POCT GLUCOSE - Abnormal       Result Value    POCT Glucose 271 (*)    POCT GLUCOSE MONITORING CONTINUOUS          Imaging Results    None          Medications - No data to display  Medical Decision Making  Differential diagnosis:  Infected wound, PICC line malfunction, cellulitis    Amount and/or Complexity of Data Reviewed  Discussion of management or test interpretation with external provider(s): There was only about 4in of line in the arm.  After cleaning the surrounding area with chlorhexidine, waiting for the area to dry, with aseptic technique I remove the remaining distal segment of the line with tip intact, no uncontrolled bleeding, no complication.  Discussed with house supervisor, ordered PICC line insertion, patient understands need for remaining in the hospital to have PICC line inserted, chest x-ray confirmation, followed by discharge should there be no complications.               ED Course as of 01/27/25 0541   Mon Jan 27, 2025   0022 A1c 10.6% on 01/18/2025 []   0541 D/c pending PICC line placement. []      ED Course User Index  [SM] Dann Monae PA-C                           Clinical Impression:  Final diagnoses:  [T82.9XXA] Central line complication, initial encounter (Primary)  [R73.9] Hyperglycemia                 Dann Monae PA-C  01/27/25 0542

## 2025-01-27 NOTE — DISCHARGE INSTRUCTIONS
Try to keep dressing clean and dry, try to avoid manipulating the dressing.  Continue with IV antibiotics, with home health care as planned.    Return to this ED if any issues with the PICC line, if you begin with redness and warmth and worsening pain to the previous insertion site, if you begin with arm swelling and discomfort, if you begin with shortness of breath or chest pain, if you develop fever, if any other problems occur.

## 2025-02-18 ENCOUNTER — PATIENT OUTREACH (OUTPATIENT)
Dept: ADMINISTRATIVE | Facility: HOSPITAL | Age: 50
End: 2025-02-18
Payer: MEDICAID

## (undated) DEVICE — PULSAVAC ZIMMER

## (undated) DEVICE — SYR 10CC LUER LOCK

## (undated) DEVICE — TOWEL OR XRAY BLUE 17X26IN

## (undated) DEVICE — BLANKET UPPER BODY 78.7X29.9IN

## (undated) DEVICE — CANISTER SUCTION 2 LTR

## (undated) DEVICE — NDL HYPO REG 25G X 1 1/2

## (undated) DEVICE — SPONGE COTTON TRAY 4X4IN

## (undated) DEVICE — PAD ABDOMINAL STERILE 8X10IN

## (undated) DEVICE — CUFF TOURNIQUET DUAL PRT

## (undated) DEVICE — PAD CAST SPECIALIST STRL 4

## (undated) DEVICE — NDL HYPO STD REG BVL 18GX1.5IN

## (undated) DEVICE — UNDERGLOVE BIOGEL PI SZ 6.5 LF

## (undated) DEVICE — GOWN NONREINF SET-IN SLV XL

## (undated) DEVICE — SOL IRR SOD CHL .9% POUR

## (undated) DEVICE — Device

## (undated) DEVICE — SOL NACL IRR 3000ML

## (undated) DEVICE — GLOVE SURGICAL LATEX SZ 6.5

## (undated) DEVICE — PAD PREP CUFFED NS 24X48IN

## (undated) DEVICE — CONTAINER SPECIMEN OR STER 4OZ

## (undated) DEVICE — ELECTRODE REM PLYHSV RETURN 9

## (undated) DEVICE — GLOVE BIOGEL PI MICRO SZ 6.5

## (undated) DEVICE — SEE MEDLINE ITEM 154981